# Patient Record
Sex: MALE | Race: WHITE | NOT HISPANIC OR LATINO | Employment: FULL TIME | URBAN - METROPOLITAN AREA
[De-identification: names, ages, dates, MRNs, and addresses within clinical notes are randomized per-mention and may not be internally consistent; named-entity substitution may affect disease eponyms.]

---

## 2017-05-11 ENCOUNTER — GENERIC CONVERSION - ENCOUNTER (OUTPATIENT)
Dept: OTHER | Facility: OTHER | Age: 57
End: 2017-05-11

## 2018-09-30 ENCOUNTER — OFFICE VISIT (OUTPATIENT)
Dept: FAMILY MEDICINE CLINIC | Facility: CLINIC | Age: 58
End: 2018-09-30
Payer: COMMERCIAL

## 2018-09-30 VITALS
DIASTOLIC BLOOD PRESSURE: 78 MMHG | HEIGHT: 67 IN | SYSTOLIC BLOOD PRESSURE: 120 MMHG | RESPIRATION RATE: 20 BRPM | OXYGEN SATURATION: 99 % | BODY MASS INDEX: 26.26 KG/M2 | HEART RATE: 57 BPM | TEMPERATURE: 97.8 F | WEIGHT: 167.3 LBS

## 2018-09-30 DIAGNOSIS — H60.502 ACUTE OTITIS EXTERNA OF LEFT EAR, UNSPECIFIED TYPE: Primary | ICD-10-CM

## 2018-09-30 PROCEDURE — 99202 OFFICE O/P NEW SF 15 MIN: CPT | Performed by: PHYSICIAN ASSISTANT

## 2018-09-30 RX ORDER — OFLOXACIN 3 MG/ML
5 SOLUTION AURICULAR (OTIC) 2 TIMES DAILY
Qty: 5 ML | Refills: 0 | Status: SHIPPED | OUTPATIENT
Start: 2018-09-30 | End: 2019-04-02

## 2018-09-30 RX ORDER — METHYLPREDNISOLONE 4 MG/1
TABLET ORAL
Qty: 21 TABLET | Refills: 0 | Status: SHIPPED | OUTPATIENT
Start: 2018-09-30 | End: 2019-04-02

## 2018-09-30 RX ORDER — AMOXICILLIN AND CLAVULANATE POTASSIUM 875; 125 MG/1; MG/1
1 TABLET, FILM COATED ORAL EVERY 12 HOURS SCHEDULED
Qty: 20 TABLET | Refills: 0 | Status: SHIPPED | OUTPATIENT
Start: 2018-09-30 | End: 2018-10-10

## 2018-09-30 NOTE — PROGRESS NOTES
Assessment/Plan:      Diagnoses and all orders for this visit:    Acute otitis externa of left ear, unspecified type  -     ofloxacin (FLOXIN) 0 3 % otic solution; Administer 5 drops into the left ear 2 (two) times a day  -     amoxicillin-clavulanate (AUGMENTIN) 875-125 mg per tablet; Take 1 tablet by mouth every 12 (twelve) hours for 10 days  -     Methylprednisolone 4 MG TBPK; Use as directed on package        Patient is a 58-year-old male presenting today for reestablishing and for left ear canal had external ear pain  He appears to have a severe otitis externa infection  I will place him on otic antibiotic drops, ofloxacin, as well as oral antibiotics, Augmentin b i d  Times 10 days, especially since I cannot even introduce the speculum further into the ear canal   I will also place him on a course of a Medrol Dosepak in hopes this will help with the amount of swelling and discomfort patient has with risks versus benefits and potential side effects discussed  He was advised to avoid anything else going into the ear including submersion under water for long periods  He also uses earplugs intermittently at work for which I have advised he avoid using them if he can  We will see how he does through the course of the week and he is to call or follow up with persistent or worsening symptoms  Patient declines flu vaccine today  Chief Complaint   Patient presents with    possible infection on L/ear     patient here to re-establish, patient states that it was having some discomfort on his L/ear sinces friday, patient states that he noticed some yellow discharge on his pillow on saturday  Subjective:     Patient ID: Shayna Mcgill is a 60 y o  male     56y/o male here today for left ear pain past 3 days  Reports pain when pushing on ear, discharge from ear, sometimes movement of jaw causes pain  Took ibuprofen yesterday  No fever, no sinus sxs  Review of Systems   Constitutional: Negative  HENT:        As in HPI   Skin:        As in HPI   Psychiatric/Behavioral: Negative  The following portions of the patient's history were reviewed and updated as appropriate: allergies, current medications, past family history, past medical history, past social history, past surgical history and problem list       Objective:     Physical Exam   Constitutional: He appears well-developed and well-nourished  HENT:   Head: Normocephalic  Right Ear: Hearing, tympanic membrane, external ear and ear canal normal    Nose: Nose normal    Mouth/Throat: Oropharynx is clear and moist      Left ear with significant tragal swelling and tenderness  The opening at the ear canal appears significantly swollen with some yellowish discharge visible  I am unable to introduce the speculum on the otoscope into the ear canal secondary to the amount of swelling  Neck: Neck supple  Normal movement of jaw with no tenderness at TMJ  No significant palpable lymphadenopathy  Cardiovascular: Normal rate, regular rhythm and normal heart sounds  Pulmonary/Chest: Effort normal and breath sounds normal    Psychiatric: He has a normal mood and affect         Vitals:    09/30/18 1030   BP: 120/78   BP Location: Left arm   Patient Position: Sitting   Cuff Size: Large   Pulse: 57   Resp: 20   Temp: 97 8 °F (36 6 °C)   TempSrc: Tympanic   SpO2: 99%   Weight: 75 9 kg (167 lb 4 8 oz)   Height: 5' 6 53" (1 69 m)

## 2019-04-02 ENCOUNTER — OFFICE VISIT (OUTPATIENT)
Dept: FAMILY MEDICINE CLINIC | Facility: CLINIC | Age: 59
End: 2019-04-02
Payer: COMMERCIAL

## 2019-04-02 VITALS
WEIGHT: 169 LBS | DIASTOLIC BLOOD PRESSURE: 80 MMHG | HEART RATE: 78 BPM | HEIGHT: 69 IN | TEMPERATURE: 98 F | SYSTOLIC BLOOD PRESSURE: 118 MMHG | RESPIRATION RATE: 16 BRPM | OXYGEN SATURATION: 98 % | BODY MASS INDEX: 25.03 KG/M2

## 2019-04-02 DIAGNOSIS — J22 ACUTE RESPIRATORY INFECTION: Primary | ICD-10-CM

## 2019-04-02 PROCEDURE — 99213 OFFICE O/P EST LOW 20 MIN: CPT | Performed by: PHYSICIAN ASSISTANT

## 2019-04-02 PROCEDURE — 3008F BODY MASS INDEX DOCD: CPT | Performed by: PHYSICIAN ASSISTANT

## 2019-07-31 ENCOUNTER — TRANSITIONAL CARE MANAGEMENT (OUTPATIENT)
Dept: FAMILY MEDICINE CLINIC | Facility: CLINIC | Age: 59
End: 2019-07-31

## 2019-08-02 ENCOUNTER — OFFICE VISIT (OUTPATIENT)
Dept: FAMILY MEDICINE CLINIC | Facility: CLINIC | Age: 59
End: 2019-08-02
Payer: COMMERCIAL

## 2019-08-02 VITALS
RESPIRATION RATE: 16 BRPM | HEART RATE: 57 BPM | OXYGEN SATURATION: 96 % | DIASTOLIC BLOOD PRESSURE: 70 MMHG | BODY MASS INDEX: 26.68 KG/M2 | SYSTOLIC BLOOD PRESSURE: 110 MMHG | TEMPERATURE: 97.6 F | WEIGHT: 170 LBS | HEIGHT: 67 IN

## 2019-08-02 DIAGNOSIS — R07.9 CHEST PAIN, UNSPECIFIED TYPE: Primary | ICD-10-CM

## 2019-08-02 DIAGNOSIS — R42 DIZZINESS: ICD-10-CM

## 2019-08-02 DIAGNOSIS — R53.1 GENERALIZED WEAKNESS: ICD-10-CM

## 2019-08-02 PROCEDURE — 99496 TRANSJ CARE MGMT HIGH F2F 7D: CPT | Performed by: FAMILY MEDICINE

## 2019-08-02 RX ORDER — OMEPRAZOLE 40 MG/1
CAPSULE, DELAYED RELEASE ORAL
COMMUNITY
Start: 2019-08-02 | End: 2019-12-09

## 2019-08-02 RX ORDER — OMEPRAZOLE 20 MG/1
20 CAPSULE, DELAYED RELEASE ORAL DAILY
COMMUNITY
Start: 2019-07-30 | End: 2019-08-02

## 2019-08-02 NOTE — PROGRESS NOTES
Assessment/Plan:   1  Chest pain, unspecified type/Generalized weakness/ Dizziness  Reviewed patient's symptoms today  At this time, it is still unclear as to the exact cause of his chest pain as well as his dizziness  He has been following up with Gastroenterology as well as his cardiologist   He currently is on a event monitor  He will be wearing this for an additional week  At the end of this monitor, he will be having a upper endoscopy to further evaluate the gastrointestinal/esophageal cause for her symptoms  Patient appears neurologically stable  Will consider MRI imaging of his brain over the next few weeks if his symptoms persist   Patient was advised that if any of his symptoms should worsen, he must go directly back to the ED  There are no diagnoses linked to this encounter  Subjective:    Chief Complaint   Patient presents with    Transition of Care Management     lightheaded, SOB, fatigue, numbness, chest tightness  sxs started abotu 1 year ago        Patient ID: Alvenia Lennox is a 60 y o  male  He presented to the ED on July 28th secondary to the development of chest pain  He was having dizziness as well as generalized weakness  He was admitted and did have multiple test is completed  He did have a stress test as well as blood work  All this testing appeared normal   It was questionable whether symptoms were secondary to esophagitis  He was started empirically on a PPI  He will also is also set up with a Holter monitor    He states that he will be continued follow-up with GI as well as his cardiologist     TCM Call (since 7/2/2019)     Date and time call was made  7/31/2019 10:05 AM    Hospital care reviewed  Records reviewed    Patient was hospitialized at  [facility]    Date of Admission  07/28/19    Date of discharge  07/30/19    Diagnosis  chest pain     Disposition  Home    Were the patients medications reviewed and updated  Yes    Current Symptoms  None TCM Call (since 7/2/2019)     Post hospital issues  None    Should patient be enrolled in anticoag monitoring? No    Scheduled for follow up? Yes    Did you obtain your prescribed medications  Yes    Do you need help managing your prescriptions or medications  No    Is transportation to your appointment needed  No    I have advised the patient to call PCP with any new or worsening symptoms    Marito Thapa LPN     Are you recieving any outpatient services  No    Are you recieving home care services  No    Are you using any community resources  No    Current waiver services  No    Have you fallen in the last 12 months  No    Interperter language line needed  No            Review of Systems   Constitutional: Positive for fatigue  Negative for activity change, chills and fever  HENT: Negative for congestion, ear pain, sinus pressure and sore throat  Eyes: Negative for redness, itching and visual disturbance  Respiratory: Negative for cough and shortness of breath  Cardiovascular: Positive for chest pain and palpitations  Gastrointestinal: Negative for abdominal pain, diarrhea and nausea  Endocrine: Negative for cold intolerance and heat intolerance  Genitourinary: Negative for dysuria, flank pain and frequency  Musculoskeletal: Negative for arthralgias, back pain, gait problem and myalgias  Skin: Negative for color change  Allergic/Immunologic: Negative for environmental allergies  Neurological: Negative for dizziness, numbness and headaches  Psychiatric/Behavioral: Negative for behavioral problems and sleep disturbance  The following portions of the patient's history were reviewed and updated as appropriate : past family history, past medical history, past social history and past surgical history        Current Outpatient Medications:     omeprazole (PriLOSEC) 40 MG capsule, , Disp: , Rfl:     Objective:    Vitals:    08/02/19 1446   BP: 110/70   BP Location: Right arm Patient Position: Sitting   Cuff Size: Adult   Pulse: 57   Resp: 16   Temp: 97 6 °F (36 4 °C)   TempSrc: Tympanic   SpO2: 96%   Weight: 77 1 kg (170 lb)   Height: 5' 6 5" (1 689 m)        Physical Exam   Constitutional: He is oriented to person, place, and time  He appears well-developed and well-nourished  HENT:   Head: Normocephalic and atraumatic  Nose: Nose normal    Mouth/Throat: No oropharyngeal exudate  Eyes: Pupils are equal, round, and reactive to light  Right eye exhibits no discharge  Left eye exhibits no discharge  Neck: Normal range of motion  Neck supple  No tracheal deviation present  Cardiovascular: Normal rate, regular rhythm and intact distal pulses  Exam reveals no gallop and no friction rub  No murmur heard  Pulses:       Dorsalis pedis pulses are 2+ on the right side, and 2+ on the left side  Posterior tibial pulses are 2+ on the right side, and 2+ on the left side  Pulmonary/Chest: Effort normal and breath sounds normal  No respiratory distress  He has no wheezes  He has no rales  Abdominal: Soft  Bowel sounds are normal  He exhibits no distension  There is no tenderness  There is no rebound and no guarding  Musculoskeletal: Normal range of motion  He exhibits no edema  Lymphadenopathy:        Head (right side): No submental and no submandibular adenopathy present  Head (left side): No submental and no submandibular adenopathy present  He has no cervical adenopathy  Right cervical: No superficial cervical, no deep cervical and no posterior cervical adenopathy present  Left cervical: No superficial cervical, no deep cervical and no posterior cervical adenopathy present  Neurological: He is alert and oriented to person, place, and time  No cranial nerve deficit or sensory deficit  Skin: Skin is warm, dry and intact  Psychiatric: His speech is normal and behavior is normal  Judgment normal  His mood appears not anxious   Cognition and memory are normal  He does not exhibit a depressed mood  Vitals reviewed

## 2019-08-07 ENCOUNTER — TELEPHONE (OUTPATIENT)
Dept: FAMILY MEDICINE CLINIC | Facility: CLINIC | Age: 59
End: 2019-08-07

## 2019-08-07 NOTE — TELEPHONE ENCOUNTER
Pt has heart monitor it will come off Tuesday or Wednesday  Pt is still c/o of same sxs feels better, but not 100 percent  Pt is asking what are his next steps are, and should he go back to work  Please advise  Pt is aware you are out of the office till tomorrow  Thank you!

## 2019-08-08 DIAGNOSIS — R53.1 GENERALIZED WEAKNESS: Primary | ICD-10-CM

## 2019-08-08 NOTE — TELEPHONE ENCOUNTER
Will have to wait until the results of the Holter monitor are obtained to determine what the exact cause of his symptoms are    Thank you

## 2019-08-14 ENCOUNTER — TELEPHONE (OUTPATIENT)
Dept: FAMILY MEDICINE CLINIC | Facility: CLINIC | Age: 59
End: 2019-08-14

## 2019-08-14 NOTE — TELEPHONE ENCOUNTER
Pt called to talk to Dr DAVID, he completed his heart monitor and wanted to know about his dizziness and getting an extension on his out of work  I recommended calling back on 8/19 when Dr DAVID is in office, calling his cardiologist and if his symptoms worsen he must go back to ER

## 2019-08-19 ENCOUNTER — TELEPHONE (OUTPATIENT)
Dept: FAMILY MEDICINE CLINIC | Facility: CLINIC | Age: 59
End: 2019-08-19

## 2019-08-19 NOTE — TELEPHONE ENCOUNTER
Pt is asking for an oow note till 8/26/19  He has appt with you then  He is still having dizziness  He currently has an out of work note up till today  Can we write this note

## 2019-08-19 NOTE — TELEPHONE ENCOUNTER
I will give him an out of work note until the 26th however after this time  , he must be advised by his specialist whether he should return to work    Thank you

## 2019-08-26 ENCOUNTER — OFFICE VISIT (OUTPATIENT)
Dept: FAMILY MEDICINE CLINIC | Facility: CLINIC | Age: 59
End: 2019-08-26
Payer: COMMERCIAL

## 2019-08-26 VITALS
OXYGEN SATURATION: 98 % | DIASTOLIC BLOOD PRESSURE: 76 MMHG | SYSTOLIC BLOOD PRESSURE: 138 MMHG | TEMPERATURE: 98.2 F | BODY MASS INDEX: 26.22 KG/M2 | WEIGHT: 173 LBS | HEART RATE: 54 BPM | HEIGHT: 68 IN

## 2019-08-26 DIAGNOSIS — R25.1 TREMOR: ICD-10-CM

## 2019-08-26 DIAGNOSIS — R53.1 GENERALIZED WEAKNESS: Primary | ICD-10-CM

## 2019-08-26 DIAGNOSIS — R43.0 ANOSMIA: ICD-10-CM

## 2019-08-26 DIAGNOSIS — R42 DIZZINESS: ICD-10-CM

## 2019-08-26 DIAGNOSIS — R53.82 CHRONIC FATIGUE: ICD-10-CM

## 2019-08-26 PROCEDURE — 99214 OFFICE O/P EST MOD 30 MIN: CPT | Performed by: FAMILY MEDICINE

## 2019-08-26 PROCEDURE — 3008F BODY MASS INDEX DOCD: CPT | Performed by: FAMILY MEDICINE

## 2019-08-26 NOTE — PROGRESS NOTES
Assessment/Plan:   1  Generalized weakness/Dizziness/Chronic fatigue/Anosmia/Tremor  Unclear as to the exact cause of patient's persistent symptoms  He has had an extensive evaluation while inpatient as well as with Cardiology  Given his diffuse symptoms as well as his tremor, lack of smell as well as his dizziness, will check MRI of his brain to rule out gross abnormalities will check blood work as well to rule out other problems such as EBV titers, Lyme titer, MEETA profile, ferritin, vitamin levels  Will follow up with patient once all testing is completed to review further in-depth   - EBV acute panel; Future  - Sedimentation rate, automated; Future  - C-reactive protein; Future  - Lyme Antibody Profile with reflex to WB; Future  - MEETA Screen w/ Reflex to Titer/Pattern; Future  - Ferritin; Future  - Comprehensive metabolic panel; Future  - MRI brain wo contrast; Future  - Vitamin D 25 hydroxy; Future  - Vitamin B12; Future       There are no diagnoses linked to this encounter  Subjective:    Chief Complaint   Patient presents with    Follow-up     Pt c/o dizziness, lightheaded, numbness on both hands, fatigued  Patient ID: Sherron Ochoa is a 60 y o  male  Patient is a 76-year-old male presents today for a one-month follow-up  Since last visit, he states he has been having persistent dizziness, lightheadedness, as well as significant fatigue  He states that he has been following up with Cardiology regularly  His last visit he reviewed his event monitor which was largely negative  He states that he has been having also persistent symptoms of numbness and tingling in both his hands as well as his feet  He denies any syncopal episodes  He states that walking short distances causes significant fatigue for him  He has not been taking any new medications  Review of Systems   Constitutional: Positive for fatigue  Negative for activity change, chills and fever     HENT: Negative for congestion, ear pain, sinus pressure and sore throat  Eyes: Negative for redness, itching and visual disturbance  Respiratory: Negative for cough and shortness of breath  Cardiovascular: Negative for chest pain and palpitations  Gastrointestinal: Negative for abdominal pain, diarrhea and nausea  Endocrine: Negative for cold intolerance and heat intolerance  Genitourinary: Negative for dysuria, flank pain and frequency  Musculoskeletal: Negative for arthralgias, back pain, gait problem and myalgias  Skin: Negative for color change  Allergic/Immunologic: Negative for environmental allergies  Neurological: Positive for dizziness and light-headedness  Negative for numbness and headaches  Psychiatric/Behavioral: Negative for behavioral problems and sleep disturbance  The following portions of the patient's history were reviewed and updated as appropriate : past family history, past medical history, past social history and past surgical history  Current Outpatient Medications:     omeprazole (PriLOSEC) 40 MG capsule, , Disp: , Rfl:     Objective:    Vitals:    08/26/19 1501   BP: 138/76   BP Location: Left arm   Patient Position: Sitting   Cuff Size: Adult   Pulse: (!) 54   Temp: 98 2 °F (36 8 °C)   TempSrc: Tympanic   SpO2: 98%   Weight: 78 5 kg (173 lb)   Height: 5' 7 91" (1 725 m)        Physical Exam   Constitutional: He is oriented to person, place, and time  He appears well-developed and well-nourished  HENT:   Head: Normocephalic and atraumatic  Nose: Nose normal    Mouth/Throat: No oropharyngeal exudate  Eyes: Pupils are equal, round, and reactive to light  Right eye exhibits no discharge  Left eye exhibits no discharge  Neck: Normal range of motion  Neck supple  No tracheal deviation present  Cardiovascular: Normal rate, regular rhythm and intact distal pulses  Exam reveals no gallop and no friction rub  No murmur heard    Pulses:       Dorsalis pedis pulses are 2+ on the right side, and 2+ on the left side  Posterior tibial pulses are 2+ on the right side, and 2+ on the left side  Pulmonary/Chest: Effort normal and breath sounds normal  No respiratory distress  He has no wheezes  He has no rales  Abdominal: Soft  Bowel sounds are normal  He exhibits no distension  There is no tenderness  There is no rebound and no guarding  Musculoskeletal: Normal range of motion  He exhibits no edema  Lymphadenopathy:        Head (right side): No submental and no submandibular adenopathy present  Head (left side): No submental and no submandibular adenopathy present  He has no cervical adenopathy  Right cervical: No superficial cervical, no deep cervical and no posterior cervical adenopathy present  Left cervical: No superficial cervical, no deep cervical and no posterior cervical adenopathy present  Neurological: He is alert and oriented to person, place, and time  No cranial nerve deficit or sensory deficit  Skin: Skin is warm, dry and intact  Psychiatric: His speech is normal and behavior is normal  Judgment normal  His mood appears not anxious  Cognition and memory are normal  He does not exhibit a depressed mood  Vitals reviewed  I have spent 30 minutes with Patient and family today in which greater than 50% of this time was spent in counseling/coordination of care regarding Diagnostic results, Prognosis, Risks and benefits of tx options, Intructions for management, Patient and family education and Importance of tx compliance

## 2019-08-27 ENCOUNTER — APPOINTMENT (OUTPATIENT)
Dept: LAB | Facility: CLINIC | Age: 59
End: 2019-08-27
Payer: COMMERCIAL

## 2019-08-27 ENCOUNTER — TELEPHONE (OUTPATIENT)
Dept: FAMILY MEDICINE CLINIC | Facility: CLINIC | Age: 59
End: 2019-08-27

## 2019-08-27 DIAGNOSIS — R42 DIZZINESS: ICD-10-CM

## 2019-08-27 DIAGNOSIS — R53.1 GENERALIZED WEAKNESS: ICD-10-CM

## 2019-08-27 DIAGNOSIS — R43.0 ANOSMIA: ICD-10-CM

## 2019-08-27 DIAGNOSIS — R53.82 CHRONIC FATIGUE: ICD-10-CM

## 2019-08-27 DIAGNOSIS — R25.1 TREMOR: ICD-10-CM

## 2019-08-27 LAB
25(OH)D3 SERPL-MCNC: 17.7 NG/ML (ref 30–100)
ALBUMIN SERPL BCP-MCNC: 4.4 G/DL (ref 3.5–5)
ALP SERPL-CCNC: 49 U/L (ref 46–116)
ALT SERPL W P-5'-P-CCNC: 27 U/L (ref 12–78)
ANION GAP SERPL CALCULATED.3IONS-SCNC: 7 MMOL/L (ref 4–13)
AST SERPL W P-5'-P-CCNC: 15 U/L (ref 5–45)
BILIRUB SERPL-MCNC: 0.82 MG/DL (ref 0.2–1)
BUN SERPL-MCNC: 14 MG/DL (ref 5–25)
CALCIUM SERPL-MCNC: 9.4 MG/DL (ref 8.3–10.1)
CHLORIDE SERPL-SCNC: 108 MMOL/L (ref 100–108)
CO2 SERPL-SCNC: 27 MMOL/L (ref 21–32)
CREAT SERPL-MCNC: 0.95 MG/DL (ref 0.6–1.3)
CRP SERPL QL: 4.4 MG/L
ERYTHROCYTE [SEDIMENTATION RATE] IN BLOOD: 7 MM/HOUR (ref 0–10)
FERRITIN SERPL-MCNC: 217 NG/ML (ref 8–388)
GFR SERPL CREATININE-BSD FRML MDRD: 87 ML/MIN/1.73SQ M
GLUCOSE SERPL-MCNC: 97 MG/DL (ref 65–140)
POTASSIUM SERPL-SCNC: 4.2 MMOL/L (ref 3.5–5.3)
PROT SERPL-MCNC: 7.6 G/DL (ref 6.4–8.2)
SODIUM SERPL-SCNC: 142 MMOL/L (ref 136–145)
VIT B12 SERPL-MCNC: 543 PG/ML (ref 100–900)

## 2019-08-27 PROCEDURE — 82306 VITAMIN D 25 HYDROXY: CPT

## 2019-08-27 PROCEDURE — 86140 C-REACTIVE PROTEIN: CPT

## 2019-08-27 PROCEDURE — 80053 COMPREHEN METABOLIC PANEL: CPT

## 2019-08-27 PROCEDURE — 82607 VITAMIN B-12: CPT

## 2019-08-27 PROCEDURE — 86663 EPSTEIN-BARR ANTIBODY: CPT

## 2019-08-27 PROCEDURE — 36415 COLL VENOUS BLD VENIPUNCTURE: CPT

## 2019-08-27 PROCEDURE — 85652 RBC SED RATE AUTOMATED: CPT

## 2019-08-27 PROCEDURE — 82728 ASSAY OF FERRITIN: CPT

## 2019-08-27 PROCEDURE — 86618 LYME DISEASE ANTIBODY: CPT

## 2019-08-27 PROCEDURE — 86665 EPSTEIN-BARR CAPSID VCA: CPT

## 2019-08-27 PROCEDURE — 86038 ANTINUCLEAR ANTIBODIES: CPT

## 2019-08-27 PROCEDURE — 86664 EPSTEIN-BARR NUCLEAR ANTIGEN: CPT

## 2019-08-28 LAB
B BURGDOR IGG+IGM SER-ACNC: <0.91 ISR (ref 0–0.9)
EBV EA IGG SER-ACNC: <9 U/ML (ref 0–8.9)
EBV NA IGG SER IA-ACNC: 201 U/ML (ref 0–17.9)
EBV PATRN SPEC IB-IMP: ABNORMAL
EBV VCA IGG SER IA-ACNC: >600 U/ML (ref 0–17.9)
EBV VCA IGM SER IA-ACNC: <36 U/ML (ref 0–35.9)
RYE IGE QN: NEGATIVE

## 2019-08-29 NOTE — TELEPHONE ENCOUNTER
[id number] [facility] # [id number], EXP 9/28/19  PT WIFE NOTIFIED AND WILL SCHEDULE APPT

## 2019-09-03 DIAGNOSIS — E55.9 VITAMIN D DEFICIENCY: Primary | ICD-10-CM

## 2019-09-03 RX ORDER — ERGOCALCIFEROL 1.25 MG/1
50000 CAPSULE ORAL WEEKLY
Qty: 8 CAPSULE | Refills: 0 | Status: SHIPPED | OUTPATIENT
Start: 2019-09-03 | End: 2019-12-09

## 2019-09-05 ENCOUNTER — OFFICE VISIT (OUTPATIENT)
Dept: FAMILY MEDICINE CLINIC | Facility: CLINIC | Age: 59
End: 2019-09-05
Payer: COMMERCIAL

## 2019-09-05 ENCOUNTER — TELEPHONE (OUTPATIENT)
Dept: FAMILY MEDICINE CLINIC | Facility: CLINIC | Age: 59
End: 2019-09-05

## 2019-09-05 VITALS
DIASTOLIC BLOOD PRESSURE: 60 MMHG | SYSTOLIC BLOOD PRESSURE: 128 MMHG | RESPIRATION RATE: 16 BRPM | OXYGEN SATURATION: 97 % | WEIGHT: 176 LBS | HEIGHT: 68 IN | HEART RATE: 53 BPM | BODY MASS INDEX: 26.67 KG/M2 | TEMPERATURE: 97.4 F

## 2019-09-05 DIAGNOSIS — R25.1 TREMOR: ICD-10-CM

## 2019-09-05 DIAGNOSIS — R42 DIZZINESS: Primary | ICD-10-CM

## 2019-09-05 DIAGNOSIS — F41.9 ANXIETY: ICD-10-CM

## 2019-09-05 DIAGNOSIS — R43.0 ANOSMIA: ICD-10-CM

## 2019-09-05 PROCEDURE — 99214 OFFICE O/P EST MOD 30 MIN: CPT | Performed by: FAMILY MEDICINE

## 2019-09-05 PROCEDURE — 3008F BODY MASS INDEX DOCD: CPT | Performed by: FAMILY MEDICINE

## 2019-09-05 RX ORDER — DULOXETIN HYDROCHLORIDE 30 MG/1
30 CAPSULE, DELAYED RELEASE ORAL DAILY
Qty: 30 CAPSULE | Refills: 2 | Status: SHIPPED | OUTPATIENT
Start: 2019-09-05 | End: 2019-10-14 | Stop reason: SDUPTHER

## 2019-09-05 NOTE — PROGRESS NOTES
Assessment/Plan:   1  Dizziness/Tremor/Anosmia  Unclear as to the exact cause of patient's symptoms today  Reviewed his recent blood work as well as his MRI imaging  All this testing appeared stable except for a low vitamin-D level  It is unlikely that this may cause his symptoms  Given his symptoms, he was advised that he may benefit from seeing a neurologist to further evaluate the other neurologic causes for his symptoms  Patient was advised that anxiety may be a component to the development of his symptoms  It appears that he is having panic symptoms when these episodes developed  He was advised that he may highly benefit from starting treatment with either gabapentin or Cymbalta  Will start treatment with Cymbalta 30 mg daily  Patient was given a continued out of work note  Follow up if any symptoms are worsening         There are no diagnoses linked to this encounter  Subjective:    Chief Complaint   Patient presents with    Follow-up     Dizziness, MRI, & BW Review        Patient ID: Alvenia Lennox is a 60 y o  male  Patient is a 50-year-old male presents today for a follow-up from his recent symptoms  Since his last visit, he states that his symptoms have been worsening  He has been noticing worsening dizziness, fatigue, tremors as well as new onset back pain  He would like to discuss all of his symptoms today  He has not been taking anything for symptom relief  He periodically develops symptoms however symptoms usually resolve in 10 minutes  Patient's wife states that often times patient will have a blank stare on his face  She denies any other symptoms today  Review of Systems   Constitutional: Positive for fatigue  Negative for activity change, chills and fever  HENT: Negative for congestion, ear pain, sinus pressure and sore throat  Eyes: Negative for redness, itching and visual disturbance  Respiratory: Negative for cough and shortness of breath      Cardiovascular: Negative for chest pain and palpitations  Gastrointestinal: Negative for abdominal pain, diarrhea and nausea  Endocrine: Negative for cold intolerance and heat intolerance  Genitourinary: Negative for dysuria, flank pain and frequency  Musculoskeletal: Positive for arthralgias and back pain  Negative for gait problem and myalgias  Skin: Negative for color change  Allergic/Immunologic: Negative for environmental allergies  Neurological: Positive for light-headedness  Negative for numbness and headaches  Psychiatric/Behavioral: Negative for behavioral problems and sleep disturbance  The following portions of the patient's history were reviewed and updated as appropriate : past family history, past medical history, past social history and past surgical history  Current Outpatient Medications:     ergocalciferol (VITAMIN D2) 50,000 units, Take 1 capsule (50,000 Units total) by mouth once a week for 8 doses, Disp: 8 capsule, Rfl: 0    omeprazole (PriLOSEC) 40 MG capsule, , Disp: , Rfl:     Objective:    Vitals:    09/05/19 1525   BP: 128/60   BP Location: Left arm   Patient Position: Sitting   Cuff Size: Standard   Pulse: (!) 53   Resp: 16   Temp: (!) 97 4 °F (36 3 °C)   TempSrc: Tympanic   SpO2: 97%   Weight: 79 8 kg (176 lb)   Height: 5' 7 91" (1 725 m)        Physical Exam   Constitutional: He is oriented to person, place, and time  He appears well-developed and well-nourished  HENT:   Head: Normocephalic and atraumatic  Nose: Nose normal    Mouth/Throat: No oropharyngeal exudate  Eyes: Pupils are equal, round, and reactive to light  Right eye exhibits no discharge  Left eye exhibits no discharge  Neck: Normal range of motion  Neck supple  No tracheal deviation present  Cardiovascular: Normal rate, regular rhythm and intact distal pulses  Exam reveals no gallop and no friction rub  No murmur heard    Pulses:       Dorsalis pedis pulses are 2+ on the right side, and 2+ on the left side  Posterior tibial pulses are 2+ on the right side, and 2+ on the left side  Pulmonary/Chest: Effort normal and breath sounds normal  No respiratory distress  He has no wheezes  He has no rales  Abdominal: Soft  Bowel sounds are normal  He exhibits no distension  There is no tenderness  There is no rebound and no guarding  Musculoskeletal: Normal range of motion  He exhibits no edema  Lymphadenopathy:        Head (right side): No submental and no submandibular adenopathy present  Head (left side): No submental and no submandibular adenopathy present  He has no cervical adenopathy  Right cervical: No superficial cervical, no deep cervical and no posterior cervical adenopathy present  Left cervical: No superficial cervical, no deep cervical and no posterior cervical adenopathy present  Neurological: He is alert and oriented to person, place, and time  No cranial nerve deficit or sensory deficit  Skin: Skin is warm, dry and intact  Psychiatric: His speech is normal and behavior is normal  Judgment normal  His mood appears not anxious  Cognition and memory are normal  He does not exhibit a depressed mood  Vitals reviewed

## 2019-09-05 NOTE — TELEPHONE ENCOUNTER
Sharmin Ames pts wife called pt was seen today 9/5/19 stated pt can not get in to see neurology till November  Stated you had discussed in appt putting pt on medication until he sees neurology  Asked you send rx to [facility]

## 2019-09-10 ENCOUNTER — TRANSCRIBE ORDERS (OUTPATIENT)
Dept: ADMINISTRATIVE | Facility: HOSPITAL | Age: 59
End: 2019-09-10

## 2019-09-10 ENCOUNTER — APPOINTMENT (OUTPATIENT)
Dept: LAB | Facility: HOSPITAL | Age: 59
End: 2019-09-10
Attending: INTERNAL MEDICINE
Payer: COMMERCIAL

## 2019-09-10 DIAGNOSIS — K29.70 GASTRITIS WITHOUT BLEEDING, UNSPECIFIED CHRONICITY, UNSPECIFIED GASTRITIS TYPE: Primary | ICD-10-CM

## 2019-09-10 DIAGNOSIS — K29.70 GASTRITIS WITHOUT BLEEDING, UNSPECIFIED CHRONICITY, UNSPECIFIED GASTRITIS TYPE: ICD-10-CM

## 2019-09-10 PROCEDURE — 83013 H PYLORI (C-13) BREATH: CPT

## 2019-09-10 PROCEDURE — 83014 H PYLORI DRUG ADMIN: CPT

## 2019-09-12 LAB — SCAN RESULT: NORMAL

## 2019-10-14 ENCOUNTER — APPOINTMENT (OUTPATIENT)
Dept: LAB | Facility: CLINIC | Age: 59
End: 2019-10-14
Payer: COMMERCIAL

## 2019-10-14 ENCOUNTER — CONSULT (OUTPATIENT)
Dept: NEUROLOGY | Facility: CLINIC | Age: 59
End: 2019-10-14
Payer: COMMERCIAL

## 2019-10-14 ENCOUNTER — TELEPHONE (OUTPATIENT)
Dept: NEUROLOGY | Facility: CLINIC | Age: 59
End: 2019-10-14

## 2019-10-14 VITALS
SYSTOLIC BLOOD PRESSURE: 126 MMHG | HEIGHT: 68 IN | HEART RATE: 64 BPM | DIASTOLIC BLOOD PRESSURE: 78 MMHG | BODY MASS INDEX: 27.19 KG/M2 | WEIGHT: 179.4 LBS

## 2019-10-14 DIAGNOSIS — R43.0 ANOSMIA: ICD-10-CM

## 2019-10-14 DIAGNOSIS — F41.9 ANXIETY: ICD-10-CM

## 2019-10-14 DIAGNOSIS — R68.89 SPELLS OF DECREASED ATTENTIVENESS: Primary | ICD-10-CM

## 2019-10-14 DIAGNOSIS — R25.1 TREMOR: ICD-10-CM

## 2019-10-14 DIAGNOSIS — R42 DIZZINESS: ICD-10-CM

## 2019-10-14 DIAGNOSIS — R68.89 SPELLS OF DECREASED ATTENTIVENESS: ICD-10-CM

## 2019-10-14 PROCEDURE — 99244 OFF/OP CNSLTJ NEW/EST MOD 40: CPT | Performed by: PSYCHIATRY & NEUROLOGY

## 2019-10-14 PROCEDURE — 84207 ASSAY OF VITAMIN B-6: CPT

## 2019-10-14 PROCEDURE — 84425 ASSAY OF VITAMIN B-1: CPT

## 2019-10-14 PROCEDURE — 36415 COLL VENOUS BLD VENIPUNCTURE: CPT

## 2019-10-14 RX ORDER — DULOXETIN HYDROCHLORIDE 60 MG/1
60 CAPSULE, DELAYED RELEASE ORAL DAILY
Qty: 30 CAPSULE | Refills: 2 | Status: SHIPPED | OUTPATIENT
Start: 2019-10-14 | End: 2020-01-10 | Stop reason: SDUPTHER

## 2019-10-14 NOTE — TELEPHONE ENCOUNTER
Abbi () states that dr Jhon Pompa is asking for a work note to excuse pt starting 10/15-12/9  Letter generated and printed  Abbi to give to pt

## 2019-10-14 NOTE — ASSESSMENT & PLAN NOTE
This is a 62-year-old patient with episodes of confusion sense of warmth,  staring and twitching  These have  increased in the last month  The increase did occur after he stops drinking a large amount of alcohol and stop smoking  He also describes inability to smell or taste  The MRI of the brain was performed and this was normal     Etiologies of the symptoms could include epileptogenic, metabolic, and/or psychiatric  It could be metabolic given his prior history of alcohol use and sudden  reduction          recent B12 and folate were  Normal       Plan 1  He is to have a sleep-deprived EEG 2  I have informed him that these spells may have be due to epileptiform discharges and/or due to psychiatric conditions  He does also seem to have component of anxiety and depression with a  the flattened affect  He is to have a B1 B6 level  At this point he is unable to return to work or to drive until further workups perform  He also may require a psychiatric evaluation  I have increased his Cymbalta to  60 mg a day  He is to start a multivitamin   with 100 mg of thiamine after the laboratory studies are performed  He is to return to our offices in six weeks

## 2019-10-14 NOTE — ASSESSMENT & PLAN NOTE
Today's examination failed to reveal any evidence of cogwheel rigidity resting tremor or tremor with extension  This may be due to more anxiety tremor and or a B6 of physiologic tremor

## 2019-10-14 NOTE — PATIENT INSTRUCTIONS
eeg    Increase cymbalta 60 mgg daily     Labs    Take mvi daily and thiamine 100mg daily, after labs     Avoid  etoh and smoking     Can not return to work at this time

## 2019-10-14 NOTE — PROGRESS NOTES
Patient ID: Jovany Lees is a 60 y o  male  Assessment/Plan:    Spells of decreased attentiveness  This is a 59-year-old patient with episodes of confusion sense of warmth,  staring and twitching  These have  increased in the last month  The increase did occur after he stops drinking a large amount of alcohol and stop smoking  He also describes inability to smell or taste  The MRI of the brain was performed and this was normal     Etiologies of the symptoms could include epileptogenic, metabolic, and/or psychiatric  It could be metabolic given his prior history of alcohol use and sudden  reduction          recent B12 and folate were  Normal       Plan 1  He is to have a sleep-deprived EEG 2  I have informed him that these spells may have be due to epileptiform discharges and/or due to psychiatric conditions  He does also seem to have component of anxiety and depression with a  the flattened affect  He is to have a B1 B6 level  At this point he is unable to return to work or to drive until further workups perform  He also may require a psychiatric evaluation  I have increased his Cymbalta to  60 mg a day  He is to start a multivitamin   with 100 mg of thiamine after the laboratory studies are performed  He is to return to our offices in six weeks  Tremors of nervous system  Today's examination failed to reveal any evidence of cogwheel rigidity resting tremor or tremor with extension  This may be due to more anxiety tremor and or a B6 of physiologic tremor  Diagnoses and all orders for this visit:    Spells of decreased attentiveness  -     Vitamin B1, whole blood; Future  -     Vitamin B6; Future  -     EEG Sleep deprived; Future    Dizziness  -     Ambulatory referral to Neurology  -     DULoxetine (CYMBALTA) 60 mg delayed release capsule;  Take 1 capsule (60 mg total) by mouth daily    Tremor  -     Ambulatory referral to Neurology  -     DULoxetine (CYMBALTA) 60 mg delayed release capsule; Take 1 capsule (60 mg total) by mouth daily    Anosmia  -     Ambulatory referral to Neurology  -     Vitamin B1, whole blood; Future  -     Vitamin B6; Future  -     DULoxetine (CYMBALTA) 60 mg delayed release capsule; Take 1 capsule (60 mg total) by mouth daily    Anxiety  -     DULoxetine (CYMBALTA) 60 mg delayed release capsule; Take 1 capsule (60 mg total) by mouth daily         Subjective:       this is a 59-year-old patient who presents in a new patient evaluation  He is a 59-year-old right-handed gentleman who presents for evaluation of spells  the spells began approximately one year ago  They are characterized by sense of the stomach warmth  This then goes up to his stomach  Becomes disoriented he had lost loses feeling in hands and feet  He loses  color in his face and hands This is sometimes a characterized by twitching of the mouth and staring  He will feel tired and fatigued after the episodes  These episodes can last between 1 minutes and 30 minutes  They were occurring intermittently but then increased in frequency  On July 28, 2019 due to the episodes he was seen in the emergency room at [facility]    brain ischemia and/or cardiac etiology was  ruled out  It does limit his ability to drive  He has not been driving  He was then evaluated by his family [de-identified] office in September  At that point he is placed on Cymbalta 30 mg tablets  He has notices some improvement with initiation of Cymbalta  He also describes episodes of tremors  Describes tremors in the inside of his chest as well as on the outside  This can occur with him about the other type of episodes  They do not occur rest   They can occur holding items in his hand but more predominantly they occur when driving or attempting to drive  He denies any lateralizing weakness or numbness  He denies any double vision    He did undergo a B12 level CMP which was all normal  Lyme titer was normal  CRP was 4 4 and slightly elevated      Was drinking a case of beer until July 28th  Since that time he has reduce his beer intake to two cans of beer on the weekends he also was smoking 1 to 2 packs a day years since age of 12  He stopped in on July 28th after his ER evaluation  He was working at the [employer]  Has not worked since July 20 he is currently not driving    Today his wife Jack Cade also accompanies him to the visit                                           The following portions of the patient's history were reviewed and updated as appropriate: He  has no past medical history on file  He  has a past surgical history that includes Cataract extraction (Bilateral); Foot surgery (Right); and Wrist surgery (Left)  His family history includes Diabetes in his maternal aunt; No Known Problems in his father and mother  He  reports that he quit smoking about 2 months ago  His smoking use included cigarettes  He has never used smokeless tobacco  He reports that he drank alcohol  He reports that he does not use drugs  Current Outpatient Medications   Medication Sig Dispense Refill    DULoxetine (CYMBALTA) 60 mg delayed release capsule Take 1 capsule (60 mg total) by mouth daily 30 capsule 2    ergocalciferol (VITAMIN D2) 50,000 units Take 1 capsule (50,000 Units total) by mouth once a week for 8 doses (Patient not taking: Reported on 10/14/2019) 8 capsule 0    omeprazole (PriLOSEC) 40 MG capsule        No current facility-administered medications for this visit  He has No Known Allergies            Objective:    Blood pressure 126/78, pulse 64, height 5' 7 91" (1 725 m), weight 81 4 kg (179 lb 6 4 oz)  Physical Exam   Eyes: Pupils are equal, round, and reactive to light  Lids are normal    Neurological: He has normal strength  Reflex Scores:       Tricep reflexes are 2+ on the right side and 2+ on the left side         Bicep reflexes are 2+ on the right side and 2+ on the left side  Patellar reflexes are 2+ on the right side and 2+ on the left side  Achilles reflexes are 2+ on the right side and 2+ on the left side  Psychiatric: His speech is normal        Neurological Exam  Mental Status   Oriented to person, place, time and situation  Speech is normal  Language is fluent with no aphasia  He has a flattened affect  Cranial Nerves  CN II: Visual acuity is normal  Visual fields full to confrontation  CN III, IV, VI: Extraocular movements intact bilaterally  Normal lids and orbits bilaterally  Pupils equal round and reactive to light bilaterally  CN V: Facial sensation is normal   CN VII: Full and symmetric facial movement  CN VIII: Hearing is normal   CN IX, X: Palate elevates symmetrically  Normal gag reflex  CN XI: Shoulder shrug strength is normal   CN XII: Tongue midline without atrophy or fasciculations  Motor  Normal muscle bulk throughout  Strength is 5/5 throughout all four extremities  Sensory  Pp, jps  And temp and intact normal     Reflexes                                           Right                      Left  Biceps                                 2+                         2+  Triceps                                2+                         2+  Patellar                                2+                         2+  Achilles                                2+                         2+  Plantar                           Downgoing                Downgoing    Right pathological reflexes: Di's absent  Left pathological reflexes: Di's absent  Coordination  Right: Finger-to-nose normal  Heel-to-shin normal   Left: Finger-to-nose normal  Heel-to-shin normal   There is no evidence of cogwheel rigidity resting tremor or tremor with extension  Gait  Casual gait is normal including stance, stride, and arm swing  Able to rise from chair without using arms      Review of systems obtained by the medical assistant as below was reviewed with the patient at today's visit    ROS:    Review of Systems   Constitutional: Positive for appetite change and fatigue  Negative for fever  HENT: Positive for tinnitus and trouble swallowing  Negative for hearing loss and voice change  Snoring  Taste or smell changes     Eyes: Negative  Negative for photophobia and pain  Respiratory: Positive for shortness of breath  Cardiovascular: Positive for palpitations  Chest pressure     Gastrointestinal: Positive for nausea  Negative for vomiting  Endocrine: Negative for cold intolerance and heat intolerance  Loss of Sexual Drive  Erection Difficulties   Genitourinary: Negative  Negative for dysuria, frequency and urgency  Musculoskeletal: Positive for back pain, joint swelling and myalgias  Negative for neck pain  Immobility or loss of function  Balance issues     Skin: Negative  Negative for rash  Allergic/Immunologic: Negative  Neurological: Positive for dizziness, light-headedness, numbness (Facial Numbness, numbness) and headaches  Negative for tremors, seizures, syncope, facial asymmetry, speech difficulty and weakness  Twitching  Tingling   Hematological: Negative  Does not bruise/bleed easily  Psychiatric/Behavioral: Negative for confusion, hallucinations and sleep disturbance  Anxiety     All other systems reviewed and are negative

## 2019-10-17 ENCOUNTER — TELEPHONE (OUTPATIENT)
Dept: OTHER | Facility: HOSPITAL | Age: 59
End: 2019-10-17

## 2019-10-17 DIAGNOSIS — R68.89 SPELLS OF DECREASED ATTENTIVENESS: Primary | ICD-10-CM

## 2019-10-17 LAB
VIT B1 BLD-SCNC: 156.2 NMOL/L (ref 66.5–200)
VIT B6 SERPL-MCNC: NORMAL UG/L

## 2019-10-18 NOTE — TELEPHONE ENCOUNTER
[facility] couldn't be completed because of the way it was packaged  It was in the incorrect tube and it had to be frozen

## 2019-10-18 NOTE — TELEPHONE ENCOUNTER
Contacted pt to inform him b6 was unable to be completed  He will have to get labs done again   lmom

## 2019-10-22 ENCOUNTER — TRANSCRIBE ORDERS (OUTPATIENT)
Dept: ADMINISTRATIVE | Facility: HOSPITAL | Age: 59
End: 2019-10-22

## 2019-10-22 ENCOUNTER — APPOINTMENT (OUTPATIENT)
Dept: LAB | Facility: HOSPITAL | Age: 59
End: 2019-10-22
Attending: PSYCHIATRY & NEUROLOGY
Payer: COMMERCIAL

## 2019-10-22 DIAGNOSIS — K29.70 GASTRITIS WITHOUT BLEEDING, UNSPECIFIED CHRONICITY, UNSPECIFIED GASTRITIS TYPE: ICD-10-CM

## 2019-10-22 DIAGNOSIS — K29.70 GASTRITIS WITHOUT BLEEDING, UNSPECIFIED CHRONICITY, UNSPECIFIED GASTRITIS TYPE: Primary | ICD-10-CM

## 2019-10-22 DIAGNOSIS — R68.89 SPELLS OF DECREASED ATTENTIVENESS: ICD-10-CM

## 2019-10-22 PROCEDURE — 36415 COLL VENOUS BLD VENIPUNCTURE: CPT

## 2019-10-22 PROCEDURE — 83013 H PYLORI (C-13) BREATH: CPT

## 2019-10-22 PROCEDURE — 83014 H PYLORI DRUG ADMIN: CPT

## 2019-10-22 PROCEDURE — 84207 ASSAY OF VITAMIN B-6: CPT

## 2019-10-26 LAB — VIT B6 SERPL-MCNC: 9.1 UG/L (ref 5.3–46.7)

## 2019-10-28 ENCOUNTER — HOSPITAL ENCOUNTER (OUTPATIENT)
Dept: NEUROLOGY | Facility: CLINIC | Age: 59
Discharge: HOME | End: 2019-10-28
Payer: COMMERCIAL

## 2019-10-28 DIAGNOSIS — R68.89 SPELLS OF DECREASED ATTENTIVENESS: ICD-10-CM

## 2019-10-28 DIAGNOSIS — R68.89 SPELLS OF DECREASED ATTENTIVENESS: Primary | ICD-10-CM

## 2019-10-28 DIAGNOSIS — G40.209 EPILEPSY WITH PARTIAL COMPLEX SEIZURES, WITHOUT STATUS EPILEPTICUS (HCC): ICD-10-CM

## 2019-10-28 PROCEDURE — 95813 EEG EXTND MNTR 61-119 MIN: CPT | Performed by: PSYCHIATRY & NEUROLOGY

## 2019-10-28 PROCEDURE — 95819 EEG AWAKE AND ASLEEP: CPT

## 2019-10-28 RX ORDER — OXCARBAZEPINE 150 MG/1
TABLET, FILM COATED ORAL
Qty: 60 TABLET | Refills: 2 | Status: SHIPPED | OUTPATIENT
Start: 2019-10-28 | End: 2019-12-09 | Stop reason: SDUPTHER

## 2019-10-28 NOTE — PROGRESS NOTES
EEG was read earlier today    Today I spoke to Alfonso Sorensen  He informs me that his spells and feeling are better  He does have a but they are not as intense  He does have feeling of being disjointed nausea with tingling  He also describes warmth sensation in the stomach  I informed him that the EEG did show hyperactivity in the left temporal region and hopefully the addition of this medication will help his symptoms      EEG does show 1  Left temporal slowing 2  Sharply contoured activity bilateral temporal region 3  Sharp activity emanating from the left temporal region with phase reversal at T3    Plan 1  He is to continue on Cymbalta 60 mg a day 2  I will start him on Trileptal 150 mg at bedtime for three days and then 150 twice a day   I sent it electronically to his pharmacy  In two weeks  he is to obtain a cmp  Please send a  of the labs to the pt in the mail  We discussed the common side effects of diplopia ataxia and severe nausea from Trileptal     He has an appointment with me on December the ninth     Please  cancel the appointment with Dr Irina Hughes on  December the 16

## 2019-10-29 ENCOUNTER — TELEPHONE (OUTPATIENT)
Dept: NEUROLOGY | Facility: CLINIC | Age: 59
End: 2019-10-29

## 2019-10-29 NOTE — TELEPHONE ENCOUNTER
I spoke to Izola Crigler ( his wife)     I explained the need for the meds, the eeg result and potential future workup     He may need a mri of the brain with contrast with temporal cuts    May need a LP as well     We discussed the side effects     We can discuss more at the next visit     No need to call her

## 2019-10-29 NOTE — TELEPHONE ENCOUNTER
Cancelled appt with Dr Kenneth Miller on 12/16  And cmp lab order mailed to patient  Poppy Craig DO at 10/28/2019  5:01 PM     EEG was read earlier today     Today I spoke to Kandice Patel  He informs me that his spells and feeling are better  He does have a but they are not as intense  He does have feeling of being disjointed nausea with tingling  He also describes warmth sensation in the stomach  I informed him that the EEG did show hyperactivity in the left temporal region and hopefully the addition of this medication will help his symptoms        EEG does show 1  Left temporal slowing 2  Sharply contoured activity bilateral temporal region 3  Sharp activity emanating from the left temporal region with phase reversal at T3     Plan 1  He is to continue on Cymbalta 60 mg a day 2  I will start him on Trileptal 150 mg at bedtime for three days and then 150 twice a day   I sent it electronically to his pharmacy  In two weeks  he is to obtain a cmp  Please send a  of the labs to the pt in the mail       We discussed the common side effects of diplopia ataxia and severe nausea from Trileptal      He has an appointment with me on December the ninth     Please  cancel the appointment with Dr Kenneth Miller on  December the 16

## 2019-10-29 NOTE — TELEPHONE ENCOUNTER
Pt's wife calls in to state that she spoke with pt yesterday and she has some additional questions  I did made her aware of your progress note message and plan of care  She is kindly asking if you could give her a call to further discuss      [phone]

## 2019-10-30 LAB — SCAN RESULT: NORMAL

## 2019-11-05 NOTE — TELEPHONE ENCOUNTER
Trileptal does  not usually cause bloated felling and gas symptoms    Would change it to bid  And check with the pcp if any other etiology     Glad the episodes are better but would address these above issues prior to changing or increasing the med     Please call back in 1 week

## 2019-11-05 NOTE — TELEPHONE ENCOUNTER
Pt's wife calls in to give update  She states that pt is still having "episodes" since starting trileptal but they are not as severe  Pt having episodes several times per day lasting about 4 mins  During an episode pt has tremors in the right arms and what pt's wife described as "tremors on the inside", but also experiences some chest tightness during this time however this is not a new symptom  She states that the episodes have been less severe than in the past  Pt no longer has the tingling in his arms and hands  Since starting trileptal, pt's stomach has become very distended and he complains of gas and a bloated feeling  Pt has been taking 2 tabs in am  Made wife aware pt to take 1 tab twice daily  She will have him start taking correctly  Pt's wife mostly concerned about GI side effects  Bowel habits have been the same but she is concerned with the distension in abdomen

## 2019-11-25 ENCOUNTER — TRANSCRIBE ORDERS (OUTPATIENT)
Dept: ADMINISTRATIVE | Facility: HOSPITAL | Age: 59
End: 2019-11-25

## 2019-11-25 ENCOUNTER — APPOINTMENT (OUTPATIENT)
Dept: LAB | Facility: HOSPITAL | Age: 59
End: 2019-11-25
Attending: INTERNAL MEDICINE
Payer: COMMERCIAL

## 2019-11-25 DIAGNOSIS — K29.70 GASTROESOPHAGITIS: ICD-10-CM

## 2019-11-25 DIAGNOSIS — B96.81 GASTRIC ULCER DUE TO HELICOBACTER PYLORI, UNSPECIFIED CHRONICITY: ICD-10-CM

## 2019-11-25 DIAGNOSIS — K25.9 GASTRIC ULCER DUE TO HELICOBACTER PYLORI, UNSPECIFIED CHRONICITY: ICD-10-CM

## 2019-11-25 DIAGNOSIS — K20.90 GASTROESOPHAGITIS: Primary | ICD-10-CM

## 2019-11-25 DIAGNOSIS — K20.90 GASTROESOPHAGITIS: ICD-10-CM

## 2019-11-25 DIAGNOSIS — K29.70 GASTROESOPHAGITIS: Primary | ICD-10-CM

## 2019-11-25 PROCEDURE — 83014 H PYLORI DRUG ADMIN: CPT

## 2019-11-25 PROCEDURE — 83013 H PYLORI (C-13) BREATH: CPT

## 2019-12-03 LAB — SCAN RESULT: NORMAL

## 2019-12-05 ENCOUNTER — TELEPHONE (OUTPATIENT)
Dept: NEUROLOGY | Facility: CLINIC | Age: 59
End: 2019-12-05

## 2019-12-09 ENCOUNTER — OFFICE VISIT (OUTPATIENT)
Dept: NEUROLOGY | Facility: CLINIC | Age: 59
End: 2019-12-09
Payer: COMMERCIAL

## 2019-12-09 VITALS
WEIGHT: 188.6 LBS | HEIGHT: 68 IN | HEART RATE: 52 BPM | DIASTOLIC BLOOD PRESSURE: 80 MMHG | BODY MASS INDEX: 28.58 KG/M2 | SYSTOLIC BLOOD PRESSURE: 140 MMHG | OXYGEN SATURATION: 95 % | RESPIRATION RATE: 14 BRPM

## 2019-12-09 DIAGNOSIS — G40.209 PARTIAL SYMPTOMATIC EPILEPSY WITH COMPLEX PARTIAL SEIZURES, NOT INTRACTABLE, WITHOUT STATUS EPILEPTICUS (HCC): Primary | ICD-10-CM

## 2019-12-09 DIAGNOSIS — G40.209 EPILEPSY WITH PARTIAL COMPLEX SEIZURES, WITHOUT STATUS EPILEPTICUS (HCC): ICD-10-CM

## 2019-12-09 DIAGNOSIS — R43.0 ANOSMIA: ICD-10-CM

## 2019-12-09 DIAGNOSIS — R42 DIZZINESS: ICD-10-CM

## 2019-12-09 DIAGNOSIS — F41.9 ANXIETY: ICD-10-CM

## 2019-12-09 DIAGNOSIS — R25.1 TREMOR: ICD-10-CM

## 2019-12-09 DIAGNOSIS — R68.89 SPELLS OF DECREASED ATTENTIVENESS: ICD-10-CM

## 2019-12-09 PROCEDURE — 99214 OFFICE O/P EST MOD 30 MIN: CPT | Performed by: PSYCHIATRY & NEUROLOGY

## 2019-12-09 RX ORDER — OXCARBAZEPINE 150 MG/1
TABLET, FILM COATED ORAL
Qty: 120 TABLET | Refills: 1 | Status: SHIPPED | OUTPATIENT
Start: 2019-12-09 | End: 2020-01-20 | Stop reason: SDUPTHER

## 2019-12-09 NOTE — ASSESSMENT & PLAN NOTE
This is a 56-year-old patient who initially presented to our offices for spells  He had undergone a MRI of the brain which was normal  He also underwent an EEG which showed left temporal sharp waves  He was started on Trileptal and is currently on Trileptal 150 mg twice a day and Cymbalta 60 mg day  He has brief auras  He is no longer having staring spells       I have asked him to increase the Trileptal to 300 mg twice a day  I have also asked him to reduce the Cymbalta to 60 mg every other day  He does complained of diarrhea  If the diarrhea persists we may need to think of alternative medications for treatment of his epilepsy  I have asked him to refrain from driving from longer distances  I have ordered a MRI of the brain with temporal cuts with three T MRI to look clearly evaluate temporal region due to his recent diagnosis     he is concerned about the return to work  I have asked his wife to look into if he had short-term disability or  long-term disability  she will call in approximately 3 to 4 weeks  He can then subsequently decide upon his return to work

## 2019-12-09 NOTE — PROGRESS NOTES
Patient ID: Akash Fine is a 60 y o  male  Assessment/Plan:    [facility]int[facility]  This is a 62-year-old patient who initially presented to our offices for spells  He had undergone a MRI of the brain which was normal  He also underwent an EEG which showed left temporal sharp waves  He was started on Trileptal and is currently on Trileptal 150 mg twice a day and Cymbalta 60 mg day  He has brief auras  He is no longer having staring spells       I have asked him to increase the Trileptal to 300 mg twice a day  I have also asked him to reduce the Cymbalta to 60 mg every other day  He does complained of diarrhea  If the diarrhea persists we may need to think of alternative medications for treatment of his epilepsy  I have asked him to refrain from driving from longer distances  I have ordered a MRI of the brain with temporal cuts with three T MRI to look clearly evaluate temporal region due to his recent diagnosis     he is concerned about the return to work  I have asked his wife to look into if he had short-term disability or  long-term disability  she will call in approximately 3 to 4 weeks  He can then subsequently decide upon his return to work  Diagnoses and all orders for this visit:    Partial symptomatic epilepsy with complex partial seizures, not intractable, without status epilepticus (HCC)    Dizziness    Tremor    Anosmia    Anxiety    Spells of decreased attentiveness  -     OXcarbazepine (TRILEPTAL) 150 mg tablet; 2 po bid    Epilepsy with partial complex seizures, without status epilepticus ([facility] )  -     MRI brain with and without contrast; Future  -     OXcarbazepine (TRILEPTAL) 150 mg tablet; 2 po bid  -     Basic metabolic panel; Future       He is to return to our offices in January    Subjective:    this is a 62-year-old patient who presents in a follow up visit   n    He is a 62-year-old right-handed gentleman who presented  for evaluation of spells  the spells began approximately 18 months ago   They are characterized by sense of the stomach warmth  This then goes up to his stomach  Becomes disoriented he had lost loses feeling in hands and feet  He loses  color in his face and hands This is sometimes a characterized by twitching of the mouth and staring  He will feel tired and fatigued after the episodes  These episodes can last between 1 minutes and 30 minutes  They were occurring intermittently but then increased in frequency  On July 28, 2019 due to the episodes he was seen in the emergency room at [facility]    brain ischemia and/or cardiac etiology was  ruled out  It does limit his ability to drive  He has not been driving  He was then evaluated by his family [de-identified] office in September  At that point he is placed on Cymbalta 30 mg tablets  it was also determined that he was drinking  alcohol  He had stopped as of the ER visit on July the 28 th     He did undergo an MRI of the brain in September of 2019 that was normal    B1 was 156 and B6 level was 9 1  After the initial visit his dose of duloxetine was increased to 60 milligrams a day and he was asked to obtain an EEG  The EEG was abnormal and it did demonstrate   Bilateral temporal slowing, left focal temporal slowing and sharp activity emanating from the left temporal region  Lucila Mortimer was started on Trileptal 150 milligrams twice a day  Since being on this medication he reports improvement of his symptomatology  He is no longer as staring spells  He does have intermittent auras but that occur  Once or twice a day but do not progress to staring spells  He denies any loss of consciousness      He does report some diarrhea which has occurred since initiating both Cymbalta and Trileptal       He also had a repeat bmp  which was normal                                     The following portions of the patient's history were reviewed and updated as appropriate:   He  has no past medical history on file  He  has a past surgical history that includes Cataract extraction (Bilateral); Foot surgery (Right); and Wrist surgery (Left)  His family history includes Diabetes in his maternal aunt; No Known Problems in his father and mother  He  reports that he quit smoking about 4 months ago  His smoking use included cigarettes  He has never used smokeless tobacco  He reports that he drank alcohol  He reports that he does not use drugs  Current Outpatient Medications   Medication Sig Dispense Refill    DULoxetine (CYMBALTA) 60 mg delayed release capsule Take 1 capsule (60 mg total) by mouth daily 30 capsule 2    OXcarbazepine (TRILEPTAL) 150 mg tablet 2 po bid 120 tablet 1     No current facility-administered medications for this visit  He has No Known Allergies            Objective:    Blood pressure 140/80, pulse (!) 52, resp  rate 14, height 5' 8" (1 727 m), weight 85 5 kg (188 lb 9 6 oz), SpO2 95 %  Physical Exam   Constitutional: He appears well-developed  HENT:   Head: Normocephalic  Eyes: Pupils are equal, round, and reactive to light  Lids are normal    Neurological: He has normal strength  Reflex Scores:       Tricep reflexes are Tr on the right side and Tr on the left side  Bicep reflexes are 1+ on the right side and 1+ on the left side  Patellar reflexes are 1+ on the right side and 1+ on the left side  Achilles reflexes are Tr on the right side and Tr on the left side  Psychiatric: His speech is normal    Vitals reviewed  Neurological Exam  Mental Status   Oriented to person, place, time and situation  Speech is normal  Language is fluent with no aphasia  Flattened affect  Cranial Nerves  CN II: Visual acuity is normal  Visual fields full to confrontation  CN III, IV, VI: Extraocular movements intact bilaterally  Normal lids and orbits bilaterally   Pupils equal round and reactive to light bilaterally  CN V: Facial sensation is normal   CN VII: Full and symmetric facial movement  CN VIII: Hearing is normal   CN IX, X: Palate elevates symmetrically  Normal gag reflex  CN XI: Shoulder shrug strength is normal   CN XII: Tongue midline without atrophy or fasciculations  Motor  Normal muscle bulk throughout  No fasciculations present  Strength is 5/5 throughout all four extremities  Sensory  Light touch is normal in upper and lower extremities  Temperature is normal in upper and lower extremities  Reflexes                                           Right                      Left  Biceps                                 1+                         1+  Triceps                                Tr                         Tr  Patellar                                1+                         1+  Achilles                                Tr                         Tr    Right pathological reflexes: Di's absent  Left pathological reflexes: Di's absent  Coordination  Right: Finger-to-nose normal   Left: Finger-to-nose normal     Gait  Normal casual, toe, heel and tandem gait  Able to rise from chair without using arms  review of systems obtained by the medical assistant as below was reviewed with the patient at today's appointment  ROS:    Review of Systems   Constitutional: Positive for activity change and unexpected weight change  Negative for appetite change and fever  HENT: Negative  Negative for hearing loss, tinnitus, trouble swallowing and voice change  Eyes: Negative  Negative for photophobia and pain  Respiratory: Negative  Negative for shortness of breath  Cardiovascular: Negative  Negative for palpitations  Gastrointestinal: Positive for diarrhea  Negative for nausea and vomiting  Endocrine: Negative  Negative for cold intolerance and heat intolerance  Genitourinary: Negative  Negative for dysuria, frequency and urgency  Musculoskeletal: Negative  Negative for myalgias and neck pain  Skin: Negative  Negative for rash  Allergic/Immunologic: Negative  Neurological: Negative  Negative for dizziness, tremors, seizures, syncope, facial asymmetry, speech difficulty, weakness, light-headedness, numbness and headaches  Hematological: Negative  Does not bruise/bleed easily  Psychiatric/Behavioral: Negative  Negative for confusion, hallucinations and sleep disturbance  All other systems reviewed and are negative

## 2019-12-23 ENCOUNTER — HOSPITAL ENCOUNTER (OUTPATIENT)
Dept: RADIOLOGY | Age: 59
Discharge: HOME | End: 2019-12-23
Payer: COMMERCIAL

## 2019-12-23 DIAGNOSIS — G40.209 EPILEPSY WITH PARTIAL COMPLEX SEIZURES, WITHOUT STATUS EPILEPTICUS (HCC): ICD-10-CM

## 2019-12-23 PROCEDURE — A9585 GADOBUTROL INJECTION: HCPCS | Performed by: PSYCHIATRY & NEUROLOGY

## 2019-12-23 PROCEDURE — 70553 MRI BRAIN STEM W/O & W/DYE: CPT

## 2019-12-23 RX ADMIN — GADOBUTROL 8 ML: 604.72 INJECTION INTRAVENOUS at 14:42

## 2019-12-26 ENCOUNTER — TELEPHONE (OUTPATIENT)
Dept: NEUROLOGY | Facility: CLINIC | Age: 59
End: 2019-12-26

## 2019-12-26 NOTE — TELEPHONE ENCOUNTER
----- Message from Ventura Noonan RN sent at 12/26/2019  8:08 AM EST -----      ----- Message -----  From: Sharona Gibbs DO  Sent: 12/26/2019   8:01 AM EST  To: [facility] [place] [facility]    Please let Alfonso Sorensen and Michael Melgar know that the mri was normal   They did   A more detailed exam and there was no change

## 2019-12-26 NOTE — TELEPHONE ENCOUNTER
Called patient regarding his MRI results  [facility] for a phone call back

## 2019-12-30 ENCOUNTER — TELEPHONE (OUTPATIENT)
Dept: NEUROLOGY | Facility: CLINIC | Age: 59
End: 2019-12-30

## 2019-12-30 NOTE — TELEPHONE ENCOUNTER
Patient called to report new onset of symptoms  Patient reports feeling anxious, nauseated, and jittery  He feels symptoms worsen around the time frame of of when he is taking duloxetine and then resolve by the next day  Currently taking duloxetine every other day  Patient also asking for a letter for employer, he doesn't feel well enough to go back to work and employer is okay with him being out of work until he has follow up with you 1/20/20  Are you agreeable to letter excusing him from work until re-evaluated in office?

## 2019-12-30 NOTE — LETTER
December 30, 2019       Patient: Marleni Vega   YOB: 1960     To Whom It May Concern:    Desiree Tristan is under my professional care  Due to his current neurological condition and the symptoms he is experiencing, please excuse him from work until he is re-evaluated in office on January 20th, 2020  His return to work status will be addressed at this time  If you have any further questions or concerns, please feel free to contact our office at [phone]       Sincerely,     Carson Herrera, DO

## 2019-12-30 NOTE — TELEPHONE ENCOUNTER
Left message on home number for a return call to office  Attempted mobile number, spoke to Dayami  Verbalized understanding with increase in frequency of cymbalta  Will attempt  Requested work note be faxed to her at [phone]  Printed and faxed  Copy also placed in mail for patient

## 2020-01-10 DIAGNOSIS — R43.0 ANOSMIA: ICD-10-CM

## 2020-01-10 DIAGNOSIS — F41.9 ANXIETY: ICD-10-CM

## 2020-01-10 DIAGNOSIS — R42 DIZZINESS: ICD-10-CM

## 2020-01-10 DIAGNOSIS — R25.1 TREMOR: ICD-10-CM

## 2020-01-10 RX ORDER — DULOXETIN HYDROCHLORIDE 60 MG/1
60 CAPSULE, DELAYED RELEASE ORAL DAILY
Qty: 30 CAPSULE | Refills: 2 | Status: SHIPPED | OUTPATIENT
Start: 2020-01-10 | End: 2020-02-24 | Stop reason: SDUPTHER

## 2020-01-10 NOTE — TELEPHONE ENCOUNTER
pt called to give an update on how he is doing  he is doing a lot better  auras are better, come and go but not as frequent or intense  still coming several times a day, last 5-10 min, before they could last all day  also states that his stomach is feeling better  no seizures  cymbalta 60mg daily  trileptal 150mg 2 tabs bid  feels better taking cymbalta every day vs every other day   He states that he needs a refill on cymbalta    Script entered for Radha Hemphill  [phone] to leave a detailed message

## 2020-01-17 ENCOUNTER — TELEPHONE (OUTPATIENT)
Dept: NEUROLOGY | Facility: CLINIC | Age: 60
End: 2020-01-17

## 2020-01-20 ENCOUNTER — OFFICE VISIT (OUTPATIENT)
Dept: NEUROLOGY | Facility: CLINIC | Age: 60
End: 2020-01-20
Payer: COMMERCIAL

## 2020-01-20 VITALS
WEIGHT: 195 LBS | HEART RATE: 57 BPM | BODY MASS INDEX: 29.55 KG/M2 | SYSTOLIC BLOOD PRESSURE: 142 MMHG | HEIGHT: 68 IN | DIASTOLIC BLOOD PRESSURE: 88 MMHG

## 2020-01-20 DIAGNOSIS — G40.209 PARTIAL SYMPTOMATIC EPILEPSY WITH COMPLEX PARTIAL SEIZURES, NOT INTRACTABLE, WITHOUT STATUS EPILEPTICUS (HCC): Primary | ICD-10-CM

## 2020-01-20 DIAGNOSIS — G40.209 EPILEPSY WITH PARTIAL COMPLEX SEIZURES, WITHOUT STATUS EPILEPTICUS (HCC): ICD-10-CM

## 2020-01-20 DIAGNOSIS — R68.89 SPELLS OF DECREASED ATTENTIVENESS: ICD-10-CM

## 2020-01-20 PROBLEM — F41.8 OTHER SPECIFIED ANXIETY DISORDERS: Status: ACTIVE | Noted: 2020-01-20

## 2020-01-20 PROCEDURE — 99214 OFFICE O/P EST MOD 30 MIN: CPT | Performed by: PSYCHIATRY & NEUROLOGY

## 2020-01-20 RX ORDER — OXCARBAZEPINE 300 MG/1
TABLET, FILM COATED ORAL
Qty: 60 TABLET | Refills: 3 | Status: SHIPPED | OUTPATIENT
Start: 2020-01-20 | End: 2020-01-31 | Stop reason: SDUPTHER

## 2020-01-20 RX ORDER — LORAZEPAM 0.5 MG/1
0.5 TABLET ORAL EVERY 8 HOURS PRN
Qty: 10 TABLET | Refills: 0 | Status: SHIPPED | OUTPATIENT
Start: 2020-01-20

## 2020-01-20 NOTE — ASSESSMENT & PLAN NOTE
He does have a component of anxiety and depression and this is somewhat improved  He is to continue on Cymbalta 60 mg a day

## 2020-01-20 NOTE — PROGRESS NOTES
Patient ID: Vicente Polo is a 60 y o  male  Assessment/Plan:    Nonintractable epilepsy with complex partial seizures ([facility] [address] )  Katelin Santillan is a 69-year-old patient with partial complex seizures  He is currently on Trileptal 300 mg twice a day  He continues to have auras however they have increased they have decreased in frequency and intensity  Today he does report of both good and bad days  Since he has now is back to 70% of his normal   I will increase his Trileptal to 450 mg twice a day  He should have a BMP prior to the next visit  Ativan was provided to the patient for severe or assist     If this proves to be ineffective and he continues to have symptoms or developed severe side effects that we have discussed we could add lamotrigine    Keppra is relatively contraindicated due to his underlying anxiety      He does complain of some intermittent blurred vision  He did have a prior history of cataract removal   Past him to seek an appointment with an ophthalmologist     I have asked him to completely refrain from alcohol and not  Drive  He was agreeable  Other specified anxiety disorders  He does have a component of anxiety and depression and this is somewhat improved  He is to continue on Cymbalta 60 mg a day  He is to contact our offices in approximately one week for progress report otherwise he is to return to our offices in one month   Diagnoses and all orders for this visit:    Partial symptomatic epilepsy with complex partial seizures, not intractable, without status epilepticus ([facility] [address] )    Spells of decreased attentiveness  -     OXcarbazepine (TRILEPTAL) 300 mg tablet; 2 po bid    Epilepsy with partial complex seizures, without status epilepticus (HCC)  -     OXcarbazepine (TRILEPTAL) 300 mg tablet; 2 po bid  -     LORazepam (ATIVAN) 0 5 mg tablet; Take 1 tablet (0 5 mg total) by mouth every 8 (eight) hours as needed for seizures  -     Basic metabolic panel;  Future     We also discussed on his nightmares which seem to be occurring during REM sleep  They occur intermittently  I have informed that this could be related to a lack of sleep the prior day  He was previously tested for sleep apnea that was negative  He had  a sleep study performed  in the past   He does snore  This could be something that we need to consider for sleep evaluation in the future  Subjective:    this is a 79-year-old patient who presents in a follow up visit   the spells began approximately 24 months ago   They are characterized by sense of the stomach warmth  This then goes up to his stomach  Becomes disoriented he had lost loses feeling in hands and feet  He loses  color in his face and hands This is sometimes a characterized by twitching of the mouth and staring  He will feel tired and fatigued after the episodes  These episodes can last between 1 minutes and 30 minutes  They were occurring intermittently but then increased in frequency  On July 28, 2019 due to the episodes he was seen in the emergency room at [facility]    brain ischemia and/or cardiac etiology was  ruled out  It does limit his ability to drive  He has not been driving  He was then evaluated by his family [de-identified] office in September  At that point he is placed on Cymbalta 30 mg tablets  The dose of Cymbalta was increased to 60 mg a day  It was also felt that he could have a component of partial complex seizures and he did undergo an EEG which was abnormal   It demonstrated bilateral temporal slowing left focal temporal slowing and sharp waves emanating from the left temporal region  Subsequently started on Trileptal 150 month mg twice a day and increase to 300 twice a day  Approximately one week ago his dose of Trileptal was increased to 450 mg twice a day  Since his last visit he is currently doing well  He does report good and bad days    On good days he will have minimal auras but on bad days have will have auras may be lasting few minutes  He did have an aura yesterday that lasted approximately several minutes  This is characterized by a sense of nausea on the stomach radiating up to his neck with of alteration of consciousness  Complaints include some blurred vision but no double vision no ataxia  A overall his spirits have improved  He is concerned about his ability to return to work  Carmen Jin and and the patient informed me that from his work environment would like him to be at 100% at his baseline  I have informed him that this may take months  It is unpredictable however at this point he is at 70% of his baseline  He does continue to drink on occasion and had three beers last week  A repeat MRI a repeat MRI of the brain showed no abnormalities in the temporal regvions with cuts      Mri of brain 12/23/19 No acute intracranial abnormality or pathologic intracranial enhancement      Normal hippocampal and parahippocampal gyri  No evidence of mesial temporal sclerosis      Nonspecific subcortical frontal white matter signal abnormality  These findings can be seen in patients with migraines and/or chronic microangiopathic disease                      I          The following portions of the patient's history were reviewed and updated as appropriate: He  has no past medical history on file  He  has a past surgical history that includes Cataract extraction (Bilateral); Foot surgery (Right); and Wrist surgery (Left)  His family history includes Diabetes in his maternal aunt; No Known Problems in his father and mother  He  reports that he quit smoking about 5 months ago  His smoking use included cigarettes  He has never used smokeless tobacco  He reports that he drank alcohol  He reports that he does not use drugs    Current Outpatient Medications   Medication Sig Dispense Refill    DULoxetine (CYMBALTA) 60 mg delayed release capsule Take 1 capsule (60 mg total) by mouth daily 30 capsule 2    OXcarbazepine (TRILEPTAL) 300 mg tablet 2 po bid 60 tablet 3    LORazepam (ATIVAN) 0 5 mg tablet Take 1 tablet (0 5 mg total) by mouth every 8 (eight) hours as needed for seizures 10 tablet 0     No current facility-administered medications for this visit  He has No Known Allergies            Objective:    Blood pressure 142/88, pulse 57, height 5' 8" (1 727 m), weight 88 5 kg (195 lb)  Physical Exam   Constitutional: He appears well-developed  HENT:   Head: Normocephalic  Eyes: Pupils are equal, round, and reactive to light  Neurological:   Reflex Scores:       Tricep reflexes are 1+ on the right side and 1+ on the left side  Bicep reflexes are 2+ on the right side and 2+ on the left side  Patellar reflexes are 2+ on the right side and 2+ on the left side  Achilles reflexes are 1+ on the right side and 1+ on the left side  Psychiatric: His speech is normal    Vitals reviewed  Neurological Exam  Mental Status   Oriented to person, place, time and situation  Speech is normal  Language is fluent with no aphasia  Spirits are improved  Cranial Nerves  CN III, IV, VI: Pupils equal round and reactive to light bilaterally  Motor   Strength is 5/5 in all four extremities except as noted  Sensory  Light touch is normal in upper and lower extremities  Temperature is normal in upper and lower extremities       Reflexes                                           Right                      Left  Biceps                                 2+                         2+  Triceps                                1+                         1+  Patellar                                2+                         2+  Achilles                                1+                         1+  Plantar                           Downgoing                Downgoing    Coordination  Right: Finger-to-nose normal  Heel-to-shin normal   Left: Finger-to-nose normal  Heel-to-shin normal     Gait  Normal casual, toe, heel and tandem gait  Able to rise from chair without using arms  Review of systems obtained from the medical assistant as below was reviewed with the patient at today's visit    ROS:    Review of Systems   Constitutional: Negative  Negative for appetite change and fever  HENT: Negative  Negative for hearing loss, tinnitus, trouble swallowing and voice change  Eyes: Positive for visual disturbance  Negative for photophobia and pain  Respiratory: Negative  Negative for shortness of breath  Cardiovascular: Negative  Negative for palpitations  Gastrointestinal: Negative  Negative for nausea and vomiting  Endocrine: Negative  Negative for cold intolerance and heat intolerance  Genitourinary: Negative  Negative for dysuria, frequency and urgency  Musculoskeletal: Negative  Negative for myalgias and neck pain  Skin: Negative  Negative for rash  Allergic/Immunologic: Negative  Neurological: Positive for tremors (left side not as servere), weakness and light-headedness  Negative for dizziness, seizures (auro's yesterday), syncope, facial asymmetry, speech difficulty, numbness and headaches  Hematological: Negative  Does not bruise/bleed easily  Psychiatric/Behavioral: Negative  Negative for confusion, hallucinations and sleep disturbance (night dreams more freg  )

## 2020-01-20 NOTE — ASSESSMENT & PLAN NOTE
Josefa Mckeon is a 16-year-old patient with partial complex seizures  He is currently on Trileptal 300 mg twice a day  He continues to have auras however they have increased they have decreased in frequency and intensity  Today he does report of both good and bad days  Since he has now is back to 70% of his normal   I will increase his Trileptal to 450 mg twice a day  He should have a BMP prior to the next visit  Ativan was provided to the patient for severe or assist     If this proves to be ineffective and he continues to have symptoms or developed severe side effects that we have discussed we could add lamotrigine    Keppra is relatively contraindicated due to his underlying anxiety      He does complain of some intermittent blurred vision  He did have a prior history of cataract removal   Past him to seek an appointment with an ophthalmologist     I have asked him to completely refrain from alcohol and not  Drive  He was agreeable

## 2020-01-20 NOTE — LETTER
January 20, 2020     Patient: Hector Polo   YOB: 1960   Date of Visit: 1/20/2020       To Whom it May Concern:    Jael Walker is under my professional care  He was seen in my office on 1/20/2020  He can not return to work at this time  He will be reevaluated on February 24, 2020 and a decision will be made after next appointment  If you have any questions or concerns, please don't hesitate to call           Sincerely,          Guera Chávez DO        CC: No Recipients

## 2020-01-30 ENCOUNTER — TELEPHONE (OUTPATIENT)
Dept: NEUROLOGY | Facility: CLINIC | Age: 60
End: 2020-01-30

## 2020-01-30 NOTE — TELEPHONE ENCOUNTER
FYI  Patient is calling with an update for Dr Fawad Andino  He states he is "feeling pretty good, the best I've felt since we started " Denies any seizures, states auras are pretty much gone  He is aware to call our office with any problems or concerns

## 2020-01-30 NOTE — TELEPHONE ENCOUNTER
So glad to hear    If he continued to do this well and is 100% back to normal by next week , he can potentially return to work     Please ask him to call in one week

## 2020-01-31 DIAGNOSIS — R68.89 SPELLS OF DECREASED ATTENTIVENESS: ICD-10-CM

## 2020-01-31 DIAGNOSIS — G40.209 EPILEPSY WITH PARTIAL COMPLEX SEIZURES, WITHOUT STATUS EPILEPTICUS (HCC): ICD-10-CM

## 2020-01-31 RX ORDER — OXCARBAZEPINE 300 MG/1
600 TABLET, FILM COATED ORAL EVERY 12 HOURS SCHEDULED
Qty: 120 TABLET | Refills: 5 | Status: SHIPPED | OUTPATIENT
Start: 2020-01-31 | End: 2020-02-24 | Stop reason: SDUPTHER

## 2020-01-31 NOTE — TELEPHONE ENCOUNTER
----- Message from Froilan Lovell sent at 1/30/2020  9:41 PM EST -----  Regarding: Prescription Question  Contact: [phone]  Dr Latasha Chow,   could you change Olman's prescription for OXcarbazepine 300 mg tablet from 60 pills to 120? He is taking 2 tab twice a day  60 pills gives us less then a month supply and the pharmacy will not give us more then 60 pills  We have to refill the current prescription already since he has only 2 more days of pills left      Thank you,  Orion Ayala

## 2020-02-13 ENCOUNTER — TELEPHONE (OUTPATIENT)
Dept: NEUROLOGY | Facility: CLINIC | Age: 60
End: 2020-02-13

## 2020-02-13 NOTE — TELEPHONE ENCOUNTER
Patient calling to let you know that he's feeling really good, but states that he hasn't been back to work yet  He has been talking to his manager, and will require a release to come back to work at 100%  But he does state again that he is feeling really good  Patient is scheduled for follow up on 2/24/20  He will discuss return to work with you at that time  He wanted to give you an update

## 2020-02-19 ENCOUNTER — TELEPHONE (OUTPATIENT)
Dept: NEUROLOGY | Facility: CLINIC | Age: 60
End: 2020-02-19

## 2020-02-24 ENCOUNTER — OFFICE VISIT (OUTPATIENT)
Dept: NEUROLOGY | Facility: CLINIC | Age: 60
End: 2020-02-24
Payer: COMMERCIAL

## 2020-02-24 VITALS
OXYGEN SATURATION: 95 % | WEIGHT: 195.1 LBS | HEART RATE: 66 BPM | HEIGHT: 68 IN | BODY MASS INDEX: 29.57 KG/M2 | DIASTOLIC BLOOD PRESSURE: 70 MMHG | RESPIRATION RATE: 16 BRPM | SYSTOLIC BLOOD PRESSURE: 124 MMHG

## 2020-02-24 DIAGNOSIS — R42 DIZZINESS: ICD-10-CM

## 2020-02-24 DIAGNOSIS — R43.0 ANOSMIA: ICD-10-CM

## 2020-02-24 DIAGNOSIS — R19.7 DIARRHEA, UNSPECIFIED TYPE: Primary | ICD-10-CM

## 2020-02-24 DIAGNOSIS — G40.209 EPILEPSY WITH PARTIAL COMPLEX SEIZURES, WITHOUT STATUS EPILEPTICUS (HCC): ICD-10-CM

## 2020-02-24 DIAGNOSIS — F41.9 ANXIETY: ICD-10-CM

## 2020-02-24 DIAGNOSIS — R68.89 SPELLS OF DECREASED ATTENTIVENESS: ICD-10-CM

## 2020-02-24 DIAGNOSIS — R25.1 TREMOR: ICD-10-CM

## 2020-02-24 DIAGNOSIS — G40.209 PARTIAL SYMPTOMATIC EPILEPSY WITH COMPLEX PARTIAL SEIZURES, NOT INTRACTABLE, WITHOUT STATUS EPILEPTICUS (HCC): ICD-10-CM

## 2020-02-24 PROCEDURE — 3008F BODY MASS INDEX DOCD: CPT | Performed by: PSYCHIATRY & NEUROLOGY

## 2020-02-24 PROCEDURE — 99214 OFFICE O/P EST MOD 30 MIN: CPT | Performed by: PSYCHIATRY & NEUROLOGY

## 2020-02-24 PROCEDURE — 1036F TOBACCO NON-USER: CPT | Performed by: PSYCHIATRY & NEUROLOGY

## 2020-02-24 RX ORDER — DULOXETIN HYDROCHLORIDE 60 MG/1
60 CAPSULE, DELAYED RELEASE ORAL DAILY
Qty: 30 CAPSULE | Refills: 5 | Status: SHIPPED | OUTPATIENT
Start: 2020-02-24 | End: 2020-03-17 | Stop reason: SDUPTHER

## 2020-02-24 RX ORDER — OXCARBAZEPINE 300 MG/1
TABLET, FILM COATED ORAL
Qty: 150 TABLET | Refills: 5 | Status: SHIPPED | OUTPATIENT
Start: 2020-02-24 | End: 2020-03-17 | Stop reason: SDUPTHER

## 2020-02-24 NOTE — ASSESSMENT & PLAN NOTE
He now complains of diarrhea which started over the weekend  I have asked him to utilize diarrhea probiotics and I told him to change his diet  ( bland)   This is probably not related to the Trileptal and less likely to Cymbalta    Previous attempts to reduce the Cymbalta did result in return of his symptoms      I have asked him to contact his family physician for any other suggestions

## 2020-02-24 NOTE — ASSESSMENT & PLAN NOTE
Brett Sheets is doing well  He has had no further auras but cause complain of severe nightmares  This improved with the higher dose of Trileptal but now have returned  He usually has a between 130 and three  Subsequently I have asked Brett Sheets to increase the dose of Trileptal to 600/ 900  If he has had no improvement and he can go back down the 600 twice a day     In the past he did have a sleep apnea test that was negative but he does snore    Subsequently a referral to sleep specialist may be helpful

## 2020-02-24 NOTE — PROGRESS NOTES
Patient ID: Luciana Palomino is a 60 y o  male  Assessment/Plan:    Nonintractable epilepsy with complex partial seizures ([facility] [address] )  Josefa Mckeon is doing well  He has had no further auras but cause complain of severe nightmares  This improved with the higher dose of Trileptal but now have returned  He usually has a between 130 and three  Subsequently I have asked Josefa Mckeon to increase the dose of Trileptal to 600/ 900  If he has had no improvement and he can go back down the 600 twice a day     In the past he did have a sleep apnea test that was negative but he does snore  Subsequently a referral to sleep specialist may be helpful     Diarrhea  He now complains of diarrhea which started over the weekend  I have asked him to utilize diarrhea probiotics and I told him to change his diet  ( bland)   This is probably not related to the Trileptal and less likely to Cymbalta  Previous attempts to reduce the Cymbalta did result in return of his symptoms      I have asked him to contact his family physician for any other suggestions    Today Nubia Negron and  Josefa Mckeon continue to inform me that he his employer  requires him to be at 100 percent without medical issues  At this point with his new complaints he is currently not at  100 percent  Therefore he cannot be released to return to work  I have asked him to utilize probiotics and other options  If the symptoms improved in the next week he can be released to work  I also informed him that in the future recurrence of the symptoms can be quite unpredictable  He does understand this    He has had no auras no seizures and therefore he can return to driving for short distances   Diagnoses and all orders for this visit:    Diarrhea, unspecified type    Spells of decreased attentiveness  -     OXcarbazepine (TRILEPTAL) 300 mg tablet; Take 2 in am and 3 at night    Epilepsy with partial complex seizures, without status epilepticus (HCC)  -     OXcarbazepine (TRILEPTAL) 300 mg tablet;  Take 2 in am and 3 at night    Dizziness  -     DULoxetine (CYMBALTA) 60 mg delayed release capsule; Take 1 capsule (60 mg total) by mouth daily    Tremor  -     DULoxetine (CYMBALTA) 60 mg delayed release capsule; Take 1 capsule (60 mg total) by mouth daily    Anosmia  -     DULoxetine (CYMBALTA) 60 mg delayed release capsule; Take 1 capsule (60 mg total) by mouth daily    Anxiety  -     DULoxetine (CYMBALTA) 60 mg delayed release capsule; Take 1 capsule (60 mg total) by mouth daily    Partial symptomatic epilepsy with complex partial seizures, not intractable, without status epilepticus ([facility] [address] )       He can return to our offices in several months but keep us in phone contact her  Subjective:         this is a 76-year-old patient who presents in a follow up visit   the spells began approximately 24 months ago   At the last visit dose of Trileptal was increase he has some cramping d to 600 mg twice a day  Overall the auras as well seizure activity has significantly improved  He has had minimal symptoms since this increase  With the higher dose of Trileptal but nightmares improved however they have now increased in intensity  They are now occurring on a regular basis  He often describes screaming at night  He also describes a several day history of diarrhea  He also admits to some cramping                       The spells are characterized by sense of the stomach warmth  This then goes up to his stomach  Becomes disoriented he had lost loses feeling in hands and feet  He loses  color in his face and hands This is sometimes a characterized by twitching of the mouth and staring  He will feel tired and fatigued after the episodes  These episodes can last between 1 minutes and 30 minutes  They were occurring intermittently but then increased in frequency  On July 28, 2019 due to the episodes he was seen in the emergency room at [facility]    brain ischemia and/or cardiac etiology was  ruled out   It does limit his ability to drive  He has not been driving  He was then evaluated by his family [de-identified] office in September  At that point he is placed on Cymbalta 30 mg tablets  The dose of Cymbalta was increased to 60 mg a day  It was also felt that he could have a component of partial complex seizures and he did undergo an EEG which was abnormal   It demonstrated bilateral temporal slowing left focal temporal slowing and sharp waves emanating from the left temporal region  He was started on 150 milligrams of Trileptal twice a day and increased to the current dose of 600 twice a day     Past attempts to decrease or discontinue the Cymbalta did result in more spells and irritability               Complaints include some blurred vision but no double vision no ataxia  A overall his spirits have improved            A repeat MRI a repeat MRI of the brain showed no abnormalities in the temporal regvions with cuts        Mri of brain 12/23/19 No acute intracranial abnormality or pathologic intracranial enhancement      Normal hippocampal and parahippocampal gyri  No evidence of mesial temporal sclerosis      Nonspecific subcortical frontal white matter signal abnormality  These findings can be seen in patients with migraines and/or chronic microangiopathic disease                                The following portions of the patient's history were reviewed and updated as appropriate:   He  has no past medical history on file  He  has a past surgical history that includes Cataract extraction (Bilateral); Foot surgery (Right); and Wrist surgery (Left)  His family history includes Diabetes in his maternal aunt; No Known Problems in his father and mother  He  reports that he quit smoking about 6 months ago  His smoking use included cigarettes  He has never used smokeless tobacco  He reports that he drank alcohol  He reports that he does not use drugs    Current Outpatient Medications   Medication Sig Dispense Refill    DULoxetine (CYMBALTA) 60 mg delayed release capsule Take 1 capsule (60 mg total) by mouth daily 30 capsule 5    OXcarbazepine (TRILEPTAL) 300 mg tablet Take 2 in am and 3 at night 150 tablet 5    LORazepam (ATIVAN) 0 5 mg tablet Take 1 tablet (0 5 mg total) by mouth every 8 (eight) hours as needed for seizures (Patient not taking: Reported on 2/24/2020) 10 tablet 0     No current facility-administered medications for this visit  He has No Known Allergies            Objective:    Blood pressure 124/70, pulse 66, resp  rate 16, height 5' 8" (1 727 m), weight 88 5 kg (195 lb 1 6 oz), SpO2 95 %  Physical Exam   Constitutional: He appears well-developed  Eyes: Pupils are equal, round, and reactive to light  Lids are normal    Cardiovascular: Normal rate  Neurological: He has normal strength  Reflex Scores:       Tricep reflexes are 1+ on the right side and 1+ on the left side  Bicep reflexes are 2+ on the right side and 2+ on the left side  Patellar reflexes are 2+ on the right side and 2+ on the left side  Achilles reflexes are 1+ on the right side and 1+ on the left side  Psychiatric: His speech is normal    Vitals reviewed  Neurological Exam  Mental Status  Awake, alert and oriented to person, place and time  Speech is normal  Language is fluent with no aphasia  Cranial Nerves  CN II: Visual acuity is normal  Visual fields full to confrontation  CN III, IV, VI: Extraocular movements intact bilaterally  Normal lids and orbits bilaterally  Pupils equal round and reactive to light bilaterally  CN V: Facial sensation is normal   CN VII: Full and symmetric facial movement  CN VIII: Hearing is normal   CN IX, X: Palate elevates symmetrically  Normal gag reflex  CN XI: Shoulder shrug strength is normal   CN XII: Tongue midline without atrophy or fasciculations  Motor  Normal muscle bulk throughout  No fasciculations present   Strength is 5/5 throughout all four extremities  Sensory  Light touch is normal in upper and lower extremities  Temperature is normal in upper and lower extremities  Reflexes                                           Right                      Left  Biceps                                 2+                         2+  Triceps                                1+                         1+  Patellar                                2+                         2+  Achilles                                1+                         1+  Plantar                           Downgoing                Downgoing    Right pathological reflexes: Di's absent  Left pathological reflexes: Di's absent  Coordination  Right: Finger-to-nose normal   Left: Finger-to-nose normal     Gait  Normal casual, toe, heel and tandem gait  Review of systems obtained from the medical assistant as below was reviewed with the patient at today's visit  ROS:    Review of Systems   Constitutional: Negative  Negative for appetite change and fever  HENT: Negative  Negative for hearing loss, tinnitus, trouble swallowing and voice change  Eyes: Negative  Negative for photophobia and pain  Respiratory: Negative  Negative for shortness of breath  Cardiovascular: Negative  Negative for palpitations  Gastrointestinal: Positive for diarrhea (friquent )  Negative for nausea and vomiting  Endocrine: Negative  Negative for cold intolerance and heat intolerance  Genitourinary: Negative  Negative for dysuria, frequency and urgency  Musculoskeletal: Negative  Negative for myalgias and neck pain  Skin: Negative  Negative for rash  Allergic/Immunologic: Negative  Neurological: Negative  Negative for dizziness, tremors, seizures, syncope, facial asymmetry, speech difficulty, weakness, light-headedness, numbness and headaches  Hematological: Negative  Does not bruise/bleed easily  Psychiatric/Behavioral: Positive for sleep disturbance (nightmares )  Negative for confusion and hallucinations  All other systems reviewed and are negative

## 2020-02-24 NOTE — LETTER
February 24, 2020     Patient: Akash Fine   YOB: 1960   Date of Visit: 2/24/2020       To Whom it May Concern:    Tamara Phillips is under my professional care  He was seen in my office on 2/24/2020  He can not return to work at this time  He will be reevaluated on March 6, 2020 and a decision will be made after next appointment  If you have any questions or concerns, please don't hesitate to call           Sincerely,          Reny Smyth DO        CC: No Recipients

## 2020-02-25 ENCOUNTER — OFFICE VISIT (OUTPATIENT)
Dept: FAMILY MEDICINE CLINIC | Facility: CLINIC | Age: 60
End: 2020-02-25
Payer: COMMERCIAL

## 2020-02-25 VITALS
BODY MASS INDEX: 29.92 KG/M2 | HEIGHT: 68 IN | DIASTOLIC BLOOD PRESSURE: 74 MMHG | HEART RATE: 66 BPM | OXYGEN SATURATION: 96 % | TEMPERATURE: 97.8 F | SYSTOLIC BLOOD PRESSURE: 138 MMHG | RESPIRATION RATE: 18 BRPM | WEIGHT: 197.4 LBS

## 2020-02-25 DIAGNOSIS — K52.9 CHRONIC DIARRHEA: Primary | ICD-10-CM

## 2020-02-25 PROCEDURE — 99214 OFFICE O/P EST MOD 30 MIN: CPT | Performed by: FAMILY MEDICINE

## 2020-02-25 PROCEDURE — 3008F BODY MASS INDEX DOCD: CPT | Performed by: FAMILY MEDICINE

## 2020-02-25 PROCEDURE — 1036F TOBACCO NON-USER: CPT | Performed by: FAMILY MEDICINE

## 2020-02-25 RX ORDER — MULTIVIT-MINERALS/FOLIC ACID 200 MCG
1 TABLET,CHEWABLE ORAL DAILY
COMMUNITY
End: 2021-03-15

## 2020-02-25 RX ORDER — SACCHAROMYCES BOULARDII 250 MG
250 CAPSULE ORAL 2 TIMES DAILY
COMMUNITY
End: 2020-02-25 | Stop reason: ALTCHOICE

## 2020-02-25 NOTE — PROGRESS NOTES
Assessment/Plan:   1  Chronic diarrhea  Reviewed patient's symptoms today  At this time, is unclear as to the exact cause of his chronic diarrhea  At this time, symptoms appear mild  Reviewed the differential of possible causes with him  He was advised today that certain medications may cause diarrhea  Common side effects from Trileptal as well as his Cymbalta are diarrhea  He believes that his symptoms did start after he initiated treatment with Cymbalta  He has tried weaning off this in the past however has had difficulty  At this time, he was advised he may benefit from weaning if agreeable with Neurology  Will check stool studies to rule out other abnormalities  If symptoms are not improving, he may benefit from seeing Gastroenterology to flu further assess if there is any other possible causes such as celiac/lactose intolerance versus inflammatory bowel disease   - Stool Enteric Bacterial Panel by PCR; Future  - Clostridium difficile toxin by PCR with EIA; Future  - Ova and parasite examination; Future           There are no diagnoses linked to this encounter  Subjective:       Chief Complaint   Patient presents with    Diarrhea     Neurology suggested Pt follow up in regars to diarrhea being a reaction to medication       Patient ID: Michael Adams is a 60 y o  male  Patient is a 70-year-old male presents today with a CC of diarrhea  He states he has been having frequent loose bowel movements for the past month  Symptoms started gradually  He denies any specific cause for symptoms  He denies any abdominal pain  He denies melena or hematochezia  He currently has not been taking any medications for symptom relief  He was advised that he would benefit from taking a probiotic  He states he has been taking this and this has improved his symptoms mildly  Review of Systems   Constitutional: Negative for activity change, chills, fatigue and fever     HENT: Negative for congestion, ear pain, sinus pressure and sore throat  Eyes: Negative for redness, itching and visual disturbance  Respiratory: Negative for cough and shortness of breath  Cardiovascular: Negative for chest pain and palpitations  Gastrointestinal: Negative for abdominal pain, diarrhea and nausea  Endocrine: Negative for cold intolerance and heat intolerance  Genitourinary: Negative for dysuria, flank pain and frequency  Musculoskeletal: Negative for arthralgias, back pain, gait problem and myalgias  Skin: Negative for color change  Allergic/Immunologic: Negative for environmental allergies  Neurological: Negative for dizziness, numbness and headaches  Psychiatric/Behavioral: Negative for behavioral problems and sleep disturbance  The following portions of the patient's history were reviewed and updated as appropriate : past family history, past medical history, past social history and past surgical history  Current Outpatient Medications:     DULoxetine (CYMBALTA) 60 mg delayed release capsule, Take 1 capsule (60 mg total) by mouth daily, Disp: 30 capsule, Rfl: 5    OXcarbazepine (TRILEPTAL) 300 mg tablet, Take 2 in am and 3 at night, Disp: 150 tablet, Rfl: 5    Probiotic Product ([employer] PROBIOTIC MAXIMUM STRENGTH) CAPS, Take 1 capsule by mouth daily, Disp: , Rfl:     LORazepam (ATIVAN) 0 5 mg tablet, Take 1 tablet (0 5 mg total) by mouth every 8 (eight) hours as needed for seizures (Patient not taking: Reported on 2/24/2020), Disp: 10 tablet, Rfl: 0         Objective:     Vitals:    02/25/20 1807   BP: 138/74   BP Location: Left arm   Patient Position: Sitting   Cuff Size: Adult   Pulse: 66   Resp: 18   Temp: 97 8 °F (36 6 °C)   TempSrc: Tympanic   SpO2: 96%   Weight: 89 5 kg (197 lb 6 4 oz)   Height: 5' 8" (1 727 m)     Physical Exam   Constitutional: He is oriented to person, place, and time  He appears well-developed and well-nourished  HENT:   Head: Normocephalic and atraumatic     Nose: Nose normal    Mouth/Throat: No oropharyngeal exudate  Eyes: Pupils are equal, round, and reactive to light  Right eye exhibits no discharge  Left eye exhibits no discharge  Neck: Normal range of motion  Neck supple  No tracheal deviation present  Cardiovascular: Normal rate, regular rhythm and intact distal pulses  Exam reveals no gallop and no friction rub  No murmur heard  Pulses:       Dorsalis pedis pulses are 2+ on the right side, and 2+ on the left side  Posterior tibial pulses are 2+ on the right side, and 2+ on the left side  Pulmonary/Chest: Effort normal and breath sounds normal  No respiratory distress  He has no wheezes  He has no rales  Abdominal: Soft  Bowel sounds are normal  He exhibits no distension  There is no tenderness  There is no rebound and no guarding  Musculoskeletal: Normal range of motion  He exhibits no edema  Lymphadenopathy:        Head (right side): No submental and no submandibular adenopathy present  Head (left side): No submental and no submandibular adenopathy present  He has no cervical adenopathy  Right cervical: No superficial cervical, no deep cervical and no posterior cervical adenopathy present  Left cervical: No superficial cervical, no deep cervical and no posterior cervical adenopathy present  Neurological: He is alert and oriented to person, place, and time  No cranial nerve deficit or sensory deficit  Skin: Skin is warm, dry and intact  Psychiatric: His speech is normal and behavior is normal  Judgment normal  His mood appears not anxious  Cognition and memory are normal  He does not exhibit a depressed mood  Vitals reviewed

## 2020-02-28 ENCOUNTER — TELEPHONE (OUTPATIENT)
Dept: NEUROLOGY | Facility: CLINIC | Age: 60
End: 2020-02-28

## 2020-02-28 NOTE — TELEPHONE ENCOUNTER
Patient reports he is doing very well  States he is taking his medications as prescribed and believes the probiotics have helped with his diarrhea  Denies any seizures or diarrhea at this time  He is asking if he can return to work on Monday  States he would need a letter stating that he can return  This can be sent to his MyChart  Please advise       [phone]  Okay to leave detailed message

## 2020-02-28 NOTE — LETTER
February 28, 2020     Michelle Hale    Patient: Michelle Hale   YOB: 1960           To Whom it May Concern,    Alden Stevens is under my professional care  Gilbert Guallpa may to return to work starting 03/02/2020  If you have any questions please do not hesitate to call our office      Sincerely,    Rosanne Alonso, DO

## 2020-02-28 NOTE — TELEPHONE ENCOUNTER
Yes  He can return to work   Zoe Salazar  Please write the note to indicate that he is cleared to return to work

## 2020-03-16 DIAGNOSIS — R25.1 TREMOR: ICD-10-CM

## 2020-03-16 DIAGNOSIS — G40.209 EPILEPSY WITH PARTIAL COMPLEX SEIZURES, WITHOUT STATUS EPILEPTICUS (HCC): ICD-10-CM

## 2020-03-16 DIAGNOSIS — R43.0 ANOSMIA: ICD-10-CM

## 2020-03-16 DIAGNOSIS — F41.9 ANXIETY: ICD-10-CM

## 2020-03-16 DIAGNOSIS — R68.89 SPELLS OF DECREASED ATTENTIVENESS: ICD-10-CM

## 2020-03-16 DIAGNOSIS — R42 DIZZINESS: ICD-10-CM

## 2020-03-17 NOTE — TELEPHONE ENCOUNTER
Jack Cade calling regarding prescriptions  Asking for cymbalta and trileptal to be sent to [facility] due to cost and insurance

## 2020-03-18 RX ORDER — OXCARBAZEPINE 300 MG/1
TABLET, FILM COATED ORAL
Qty: 450 TABLET | Refills: 1 | Status: SHIPPED | OUTPATIENT
Start: 2020-03-18 | End: 2020-09-08

## 2020-03-18 RX ORDER — DULOXETIN HYDROCHLORIDE 60 MG/1
60 CAPSULE, DELAYED RELEASE ORAL DAILY
Qty: 90 CAPSULE | Refills: 1 | Status: SHIPPED | OUTPATIENT
Start: 2020-03-18 | End: 2020-09-08

## 2020-03-20 ENCOUNTER — TELEPHONE (OUTPATIENT)
Dept: INTERNAL MEDICINE CLINIC | Facility: CLINIC | Age: 60
End: 2020-03-20

## 2020-03-20 NOTE — TELEPHONE ENCOUNTER
----- Message from Brandin Carranza sent at 3/19/2020  5:53 PM EDT -----  Regarding: Prescription Question  Contact: [phone]  Rl Rivas, i think i may have talked to you the other day regarding Olman's prescription  I was told by the mail order pharmacy that the got the request fir the meds, but no prescription  They stated that you have to fax the prescription to [phone]  The phone number is pp [phone]  Please let me know if you need anything else  Thank you for your help  You can call me at [phone] should you need other information     Javon Murcia

## 2020-05-15 ENCOUNTER — TELEPHONE (OUTPATIENT)
Dept: NEUROLOGY | Facility: CLINIC | Age: 60
End: 2020-05-15

## 2020-05-18 ENCOUNTER — OFFICE VISIT (OUTPATIENT)
Dept: NEUROLOGY | Facility: CLINIC | Age: 60
End: 2020-05-18
Payer: COMMERCIAL

## 2020-05-18 VITALS
BODY MASS INDEX: 29.73 KG/M2 | HEART RATE: 72 BPM | WEIGHT: 195.5 LBS | DIASTOLIC BLOOD PRESSURE: 72 MMHG | SYSTOLIC BLOOD PRESSURE: 128 MMHG

## 2020-05-18 DIAGNOSIS — R19.7 DIARRHEA, UNSPECIFIED TYPE: ICD-10-CM

## 2020-05-18 DIAGNOSIS — G40.209 PARTIAL SYMPTOMATIC EPILEPSY WITH COMPLEX PARTIAL SEIZURES, NOT INTRACTABLE, WITHOUT STATUS EPILEPTICUS (HCC): Primary | ICD-10-CM

## 2020-05-18 PROCEDURE — 1036F TOBACCO NON-USER: CPT | Performed by: PSYCHIATRY & NEUROLOGY

## 2020-05-18 PROCEDURE — 99213 OFFICE O/P EST LOW 20 MIN: CPT | Performed by: PSYCHIATRY & NEUROLOGY

## 2020-06-04 ENCOUNTER — TELEPHONE (OUTPATIENT)
Dept: NEUROLOGY | Facility: CLINIC | Age: 60
End: 2020-06-04

## 2020-06-12 ENCOUNTER — TELEPHONE (OUTPATIENT)
Dept: NEUROLOGY | Facility: CLINIC | Age: 60
End: 2020-06-12

## 2020-06-30 ENCOUNTER — PATIENT MESSAGE (OUTPATIENT)
Dept: FAMILY MEDICINE CLINIC | Facility: CLINIC | Age: 60
End: 2020-06-30

## 2020-07-24 ENCOUNTER — OFFICE VISIT (OUTPATIENT)
Dept: FAMILY MEDICINE CLINIC | Facility: CLINIC | Age: 60
End: 2020-07-24
Payer: COMMERCIAL

## 2020-07-24 VITALS
DIASTOLIC BLOOD PRESSURE: 68 MMHG | HEART RATE: 61 BPM | HEIGHT: 68 IN | TEMPERATURE: 97.2 F | SYSTOLIC BLOOD PRESSURE: 128 MMHG | BODY MASS INDEX: 29.01 KG/M2 | WEIGHT: 191.4 LBS | RESPIRATION RATE: 17 BRPM | OXYGEN SATURATION: 96 %

## 2020-07-24 DIAGNOSIS — R73.09 ELEVATED GLUCOSE LEVEL: Primary | ICD-10-CM

## 2020-07-24 DIAGNOSIS — G56.03 BILATERAL CARPAL TUNNEL SYNDROME: ICD-10-CM

## 2020-07-24 LAB — SL AMB POCT HEMOGLOBIN AIC: 5.6 (ref ?–6.5)

## 2020-07-24 PROCEDURE — 99214 OFFICE O/P EST MOD 30 MIN: CPT | Performed by: FAMILY MEDICINE

## 2020-07-24 PROCEDURE — 3008F BODY MASS INDEX DOCD: CPT | Performed by: FAMILY MEDICINE

## 2020-07-24 PROCEDURE — 1036F TOBACCO NON-USER: CPT | Performed by: FAMILY MEDICINE

## 2020-07-24 PROCEDURE — 83036 HEMOGLOBIN GLYCOSYLATED A1C: CPT | Performed by: FAMILY MEDICINE

## 2020-07-24 NOTE — PROGRESS NOTES
Assessment/Plan:   1  Elevated glucose level  Reviewed patient's previous blood work with him  His fasting blood sugar was minimally elevated  At this time, his A1c level in the office today was 5 6  He was advised that this level is considered truly normal   At this time, he was advised to continue with his normal exercise and dietary measures  No further testing needed at this moment  - POCT hemoglobin A1c    2  Bilateral carpal tunnel syndrome  Patient's symptoms appear likely secondary to possible bilateral carpal tunnel syndrome  He was educated on pathophysiology is problem  At this time, he was instructed on specific stretching exercises  He was also educated on massaging techniques for this area as well  He may use bilateral wrist braces for carpal tunnel at bedtime when he is sleeping  If any symptoms are worsening, he was advised to call or follow up  Diagnoses and all orders for this visit:    Elevated glucose level  -     POCT hemoglobin A1c          Subjective:       Chief Complaint   Patient presents with    Follow-up     high glucose      Patient ID: Yashira Shaw is a 63 y o  male  Patient is a 57-year-old male presents today to review reviewed his previous blood work completed by his specialist   He would also like to have an A1c level today  He was informed that his blood sugar levels have been mildly elevated  He states that he denies any recent changes in his weight or exercise significantly  Patient has secondary complaints of bilateral wrist discomfort as well as numbness and tingling  He has had this for the past few months  Symptoms started gradually  He states that the symptoms are worsen as he is using his equipment at work with stands  He notes that he does use equipment that has high vibration and is concerned that this may be causing his symptoms  Review of Systems   Constitutional: Negative for activity change, chills, fatigue and fever     HENT: Negative for congestion, ear pain, sinus pressure and sore throat  Eyes: Negative for redness, itching and visual disturbance  Respiratory: Negative for cough and shortness of breath  Cardiovascular: Negative for chest pain and palpitations  Gastrointestinal: Negative for abdominal pain, diarrhea and nausea  Endocrine: Negative for cold intolerance and heat intolerance  Genitourinary: Negative for dysuria, flank pain and frequency  Musculoskeletal: Negative for arthralgias, back pain, gait problem and myalgias  Skin: Negative for color change  Allergic/Immunologic: Negative for environmental allergies  Neurological: Negative for dizziness, numbness and headaches  Psychiatric/Behavioral: Negative for behavioral problems and sleep disturbance  The following portions of the patient's history were reviewed and updated as appropriate : past family history, past medical history, past social history and past surgical history  Current Outpatient Medications:     DULoxetine (CYMBALTA) 60 mg delayed release capsule, Take 1 capsule (60 mg total) by mouth daily, Disp: 90 capsule, Rfl: 1    OXcarbazepine (TRILEPTAL) 300 mg tablet, Take 2 tablets (600mg) by mouth in am and 3 tablets (900mg) at night  3 month supply, Disp: 450 tablet, Rfl: 1    Probiotic Product ([employer] PROBIOTIC MAXIMUM STRENGTH) CAPS, Take 1 capsule by mouth daily, Disp: , Rfl:     LORazepam (ATIVAN) 0 5 mg tablet, Take 1 tablet (0 5 mg total) by mouth every 8 (eight) hours as needed for seizures (Patient not taking: Reported on 7/24/2020), Disp: 10 tablet, Rfl: 0         Objective:         Vitals:    07/24/20 1631   BP: 128/68   BP Location: Right arm   Patient Position: Sitting   Cuff Size: Adult   Pulse: 61   Resp: 17   Temp: (!) 97 2 °F (36 2 °C)   TempSrc: Tympanic   SpO2: 96%   Weight: 86 8 kg (191 lb 6 4 oz)   Height: 5' 8" (1 727 m)     Physical Exam   Constitutional: He is oriented to person, place, and time   He appears well-developed and well-nourished  HENT:   Head: Normocephalic and atraumatic  Nose: Nose normal    Mouth/Throat: No oropharyngeal exudate  Eyes: Pupils are equal, round, and reactive to light  Right eye exhibits no discharge  Left eye exhibits no discharge  Neck: Normal range of motion  Neck supple  No tracheal deviation present  Cardiovascular: Normal rate, regular rhythm and intact distal pulses  Exam reveals no gallop and no friction rub  No murmur heard  Pulses:       Dorsalis pedis pulses are 2+ on the right side, and 2+ on the left side  Posterior tibial pulses are 2+ on the right side, and 2+ on the left side  Pulmonary/Chest: Effort normal and breath sounds normal  No respiratory distress  He has no wheezes  He has no rales  Abdominal: Soft  Bowel sounds are normal  He exhibits no distension  There is no tenderness  There is no rebound and no guarding  Musculoskeletal: Normal range of motion  He exhibits no edema  Lymphadenopathy:        Head (right side): No submental and no submandibular adenopathy present  Head (left side): No submental and no submandibular adenopathy present  He has no cervical adenopathy  Right cervical: No superficial cervical, no deep cervical and no posterior cervical adenopathy present  Left cervical: No superficial cervical, no deep cervical and no posterior cervical adenopathy present  Neurological: He is alert and oriented to person, place, and time  No cranial nerve deficit or sensory deficit  Skin: Skin is warm, dry and intact  Psychiatric: His speech is normal and behavior is normal  Judgment normal  His mood appears not anxious  Cognition and memory are normal  He does not exhibit a depressed mood  Vitals reviewed

## 2020-09-08 DIAGNOSIS — G40.209 EPILEPSY WITH PARTIAL COMPLEX SEIZURES, WITHOUT STATUS EPILEPTICUS (HCC): ICD-10-CM

## 2020-09-08 DIAGNOSIS — F41.9 ANXIETY: ICD-10-CM

## 2020-09-08 DIAGNOSIS — R43.0 ANOSMIA: ICD-10-CM

## 2020-09-08 DIAGNOSIS — R42 DIZZINESS: ICD-10-CM

## 2020-09-08 DIAGNOSIS — R25.1 TREMOR: ICD-10-CM

## 2020-09-08 DIAGNOSIS — R68.89 SPELLS OF DECREASED ATTENTIVENESS: ICD-10-CM

## 2020-09-08 RX ORDER — DULOXETIN HYDROCHLORIDE 60 MG/1
CAPSULE, DELAYED RELEASE ORAL
Qty: 90 CAPSULE | Refills: 1 | Status: SHIPPED | OUTPATIENT
Start: 2020-09-08 | End: 2021-02-14 | Stop reason: SDUPTHER

## 2020-09-08 RX ORDER — OXCARBAZEPINE 300 MG/1
TABLET, FILM COATED ORAL
Qty: 450 TABLET | Refills: 1 | Status: SHIPPED | OUTPATIENT
Start: 2020-09-08 | End: 2021-02-14 | Stop reason: SDUPTHER

## 2020-09-28 ENCOUNTER — TELEPHONE (OUTPATIENT)
Dept: NEUROLOGY | Facility: CLINIC | Age: 60
End: 2020-09-28

## 2020-09-28 NOTE — TELEPHONE ENCOUNTER
----- Message from Laura President sent at 9/25/2020  6:14 PM EDT -----  Regarding: Visit Follow-Up Question  Contact: [phone]  We have a scheduled apt 9/28/20 at 8:30 am but I do not see it on the visit list and no one called to confirm the apt   Can someone confirm that we still have the apt?

## 2020-09-28 NOTE — TELEPHONE ENCOUNTER
Patient came to [facility] office today  He stated that on his last visit we had a power outage and we could not book his appointment after his visit  Appointment was Johana Aschoff as note on his AVS   I apologized for the inconvenience and scheduled him for the next available  I also put him on the [facility] 4 patient appointment reminder card  (Management is aware)

## 2020-09-29 ENCOUNTER — TELEPHONE (OUTPATIENT)
Dept: NEUROLOGY | Facility: CLINIC | Age: 60
End: 2020-09-29

## 2020-09-29 NOTE — TELEPHONE ENCOUNTER
Patient's spouse Myla Schwab called back to speak with Maranda Ascencio, Elver Aguirre regarding the possible appointments that she was going to offer her   Spoke with Elver Kiran and she stated to offer the patient tomorrow at [facility] 12PM or 2PM with Dr Boston Appl  Spouse declined the appointments offered and stated that they will keep their 11/2/2020 appt because patient is unable to get off work again since he was off yesterday  She appreciated the office for trying to get her  in sooner

## 2020-10-16 ENCOUNTER — TELEPHONE (OUTPATIENT)
Dept: FAMILY MEDICINE CLINIC | Facility: CLINIC | Age: 60
End: 2020-10-16

## 2020-10-16 DIAGNOSIS — G56.03 BILATERAL CARPAL TUNNEL SYNDROME: Primary | ICD-10-CM

## 2020-11-02 ENCOUNTER — OFFICE VISIT (OUTPATIENT)
Dept: NEUROLOGY | Facility: CLINIC | Age: 60
End: 2020-11-02
Payer: COMMERCIAL

## 2020-11-02 VITALS
HEART RATE: 55 BPM | BODY MASS INDEX: 29.99 KG/M2 | TEMPERATURE: 97.3 F | SYSTOLIC BLOOD PRESSURE: 140 MMHG | HEIGHT: 68 IN | WEIGHT: 197.9 LBS | DIASTOLIC BLOOD PRESSURE: 78 MMHG

## 2020-11-02 DIAGNOSIS — G56.03 CARPAL TUNNEL SYNDROME, BILATERAL: Primary | ICD-10-CM

## 2020-11-02 DIAGNOSIS — G40.209 PARTIAL SYMPTOMATIC EPILEPSY WITH COMPLEX PARTIAL SEIZURES, NOT INTRACTABLE, WITHOUT STATUS EPILEPTICUS (HCC): ICD-10-CM

## 2020-11-02 PROCEDURE — 1036F TOBACCO NON-USER: CPT | Performed by: PSYCHIATRY & NEUROLOGY

## 2020-11-02 PROCEDURE — 99214 OFFICE O/P EST MOD 30 MIN: CPT | Performed by: PSYCHIATRY & NEUROLOGY

## 2020-11-05 ENCOUNTER — OFFICE VISIT (OUTPATIENT)
Dept: OBGYN CLINIC | Facility: MEDICAL CENTER | Age: 60
End: 2020-11-05
Payer: COMMERCIAL

## 2020-11-05 VITALS
SYSTOLIC BLOOD PRESSURE: 133 MMHG | WEIGHT: 198 LBS | TEMPERATURE: 98.9 F | HEART RATE: 69 BPM | HEIGHT: 68 IN | DIASTOLIC BLOOD PRESSURE: 71 MMHG | BODY MASS INDEX: 30.01 KG/M2

## 2020-11-05 DIAGNOSIS — G56.03 BILATERAL CARPAL TUNNEL SYNDROME: ICD-10-CM

## 2020-11-05 PROCEDURE — 3008F BODY MASS INDEX DOCD: CPT | Performed by: ORTHOPAEDIC SURGERY

## 2020-11-05 PROCEDURE — 1036F TOBACCO NON-USER: CPT | Performed by: ORTHOPAEDIC SURGERY

## 2020-11-05 PROCEDURE — 99244 OFF/OP CNSLTJ NEW/EST MOD 40: CPT | Performed by: ORTHOPAEDIC SURGERY

## 2020-12-14 ENCOUNTER — TELEPHONE (OUTPATIENT)
Dept: OBGYN CLINIC | Facility: MEDICAL CENTER | Age: 60
End: 2020-12-14

## 2021-01-13 ENCOUNTER — OFFICE VISIT (OUTPATIENT)
Dept: OBGYN CLINIC | Facility: MEDICAL CENTER | Age: 61
End: 2021-01-13
Payer: COMMERCIAL

## 2021-01-13 VITALS
WEIGHT: 197 LBS | HEIGHT: 68 IN | HEART RATE: 74 BPM | BODY MASS INDEX: 29.86 KG/M2 | DIASTOLIC BLOOD PRESSURE: 72 MMHG | SYSTOLIC BLOOD PRESSURE: 122 MMHG

## 2021-01-13 DIAGNOSIS — G56.03 BILATERAL CARPAL TUNNEL SYNDROME: Primary | ICD-10-CM

## 2021-01-13 DIAGNOSIS — G56.23 CUBITAL TUNNEL SYNDROME, BILATERAL: ICD-10-CM

## 2021-01-13 PROCEDURE — 99214 OFFICE O/P EST MOD 30 MIN: CPT | Performed by: ORTHOPAEDIC SURGERY

## 2021-01-13 PROCEDURE — 1036F TOBACCO NON-USER: CPT | Performed by: ORTHOPAEDIC SURGERY

## 2021-01-13 PROCEDURE — 3008F BODY MASS INDEX DOCD: CPT | Performed by: ORTHOPAEDIC SURGERY

## 2021-01-13 NOTE — PROGRESS NOTES
Chief Complaint     Bilateral hand numbness, tingling and pain       History of Present Illness     Akash Fine is a 63 y o  male presents today with continued bilateral hand numbness, tingling and pain  Patient stated that his numbness and tingling have not improved since last visit  He stated that his numbness has now increased to all four digits and his thumb bilaterally  He stated that he has been wearing his wife's wrist braces at night since his last visit on 11/5/2020 and has had improvement with pain  Patient stated that he no longer wakes up at night in pain  He stated that he only experiences pain when he is in a push up position  Past Medical History:   Diagnosis Date    Anxiety     Seizure disorder [facility])        Past Surgical History:   Procedure Laterality Date    APPENDECTOMY      CATARACT EXTRACTION Bilateral     COLONOSCOPY  2010    normal    EGD  08/2019    H  pylori gastritis, treated with ampicillin, clarithromycin and omeprazole    FOOT SURGERY Right     WRIST SURGERY Left        No Known Allergies    Current Outpatient Medications on File Prior to Visit   Medication Sig Dispense Refill    DULoxetine (CYMBALTA) 60 mg delayed release capsule TAKE ONE CAPSULE BY MOUTH DAILY 90 capsule 1    OXcarbazepine (TRILEPTAL) 300 mg tablet TAKE 2 TABLETS BY MOUTH IN THE MORNING AND 3 TABLETS AT NIGHT 450 tablet 1    Probiotic Product ([employer] PROBIOTIC MAXIMUM STRENGTH) CAPS Take 1 capsule by mouth daily      LORazepam (ATIVAN) 0 5 mg tablet Take 1 tablet (0 5 mg total) by mouth every 8 (eight) hours as needed for seizures (Patient not taking: Reported on 7/24/2020) 10 tablet 0    Suprep Bowel Prep Kit 17 5-3 13-1 6 GM/177ML SOLN Take 1 Bottle by mouth once for 1 dose 1 Bottle 0     No current facility-administered medications on file prior to visit          Social History     Tobacco Use    Smoking status: Former Smoker     Types: Cigarettes     Quit date: 7/28/2019     Years since quittin 4    Smokeless tobacco: Never Used   Substance Use Topics    Alcohol use: Not Currently     Comment: occasional    Drug use: No       Family History   Problem Relation Age of Onset    No Known Problems Mother     No Known Problems Father     Diabetes Maternal Aunt     Breast cancer Sister     Colon cancer Neg Hx        Review of Systems     As stated in the HPI  All other systems were reviewed and are negative  Physical Exam     /72   Pulse 74   Ht 5' 8" (1 727 m)   Wt 89 4 kg (197 lb)   BMI 29 95 kg/m²     GENERAL: This is a well-developed, well-nourished, age-appropriate patient in no acute distress  The patient is alert and oriented x3  Pleasant and cooperative  Eyes: Anicteric sclerae  Extraocular movements appear intact  HENT: Nares are patent with no drainage  Lungs: There is equal chest rise on inspection  Breathing is non-labored with no audible wheezing  Cardiovascular: No cyanosis  No upper extremity lymphadema  Skin: Skin is warm to touch  No obvious skin lesions or rashes other than described below  Neurologic: No ataxia  Psychiatric: Mood and affect are appropriate  Bilateral Upper Extremity:   Skin intact  No atrophy  No swelling noted  No erythema or ecchymosis   DPC 0 Bilaterally   + Tinel's at carpal tunnel   + Tinel's at cubital tunnel   + Durkans compression test (R); negative (L)  + flexion compression test bilaterally  5/5 Motor to the APB, FDI, FDP2, FDP5, EDC  Sensation intact to light touch in the median, radial, and ulnar nerve distribution    Brisk capillary refill     Data Review     Results Reviewed     None             Imaging:  No new images today    Assessment and Plan      Diagnoses and all orders for this visit:    Bilateral carpal tunnel syndrome    Cubital tunnel syndrome, bilateral         63 y o  male presents with both bilateral carpal now with definitive symptomatology and exam suggestive of cubital syndrome    We discussed the etiology and natural course of cubital tunnel syndrome at the elbow  This is related to compression of the ulnar nerve at or around the medial epicondyle or FCU fascia  Compression typically results in numbness to the hand, pain, and occasionally weakness  We discussed that there are nonoperative and operative modalities for treatment and at the majority of patients benefit from non operative modalities  Typically, this involves the night splinting and ergonomic adjustments to prevent direct pressure or compression of the ulnar nerve at the elbow  Surgical treatment can involve in situ decompression or transposition if the nerve is unstable  I discussed with patient that he may continue night time bracing with his wrist braces  He may also add towel splinting at night for his bilateral cubital tunnel syndrome  We discussed both surgical and non surgical options  Patient would like to proceed with night time splinting at this time  Patient will come back to the office in 6 weeks for re examination and to discuss possible surgical options at this time           Follow Up: 6 weeks    To Do Next Visit: re examine bilateral hands and elbows    PROCEDURES PERFORMED:  Procedures  No Procedures performed today        Scribe Attestation    I,:  Beatrice Sepulveda am acting as a scribe while in the presence of the attending physician :       I,:  Javier Limon MD personally performed the services described in this documentation    as scribed in my presence :

## 2021-01-14 ENCOUNTER — TELEPHONE (OUTPATIENT)
Dept: NEUROLOGY | Facility: CLINIC | Age: 61
End: 2021-01-14

## 2021-01-14 ENCOUNTER — TELEPHONE (OUTPATIENT)
Dept: OTHER | Facility: HOSPITAL | Age: 61
End: 2021-01-14

## 2021-01-14 DIAGNOSIS — G56.03 CARPAL TUNNEL SYNDROME, BILATERAL: Primary | ICD-10-CM

## 2021-01-14 NOTE — TELEPHONE ENCOUNTER
Call received from [facility]  They spoke to patient  Patient reported to them a "seizure" type episode that occurred in October time frame  Patient states this was due to mixing up medications  Described by wife as a "transe"/staring type episode  Due to this event endoscopy lab is requesting a clearance letter as patient is scheduled for colonoscopy tomorrow - 1/15/2021  Are you agreeable to letter as below? To Whom It May Concern:    Brandan Morales was last seen in office 11/2/2020  He is cleared from a neurological perspective for his colonoscopy scheduled for 1/15/2021  Please feel free to call our office with further questions or concerns  Letter to be faxed to [phone]  Phone: [phone]

## 2021-01-14 NOTE — TELEPHONE ENCOUNTER
Please schedule him for an EMG at the [address] available on a Tuesday and Thursday I will also place him on a cancellation

## 2021-01-14 NOTE — TELEPHONE ENCOUNTER
Spoke to wife     Yasmine Celaya has a  a history of partial complex seizures and is on Trileptal    His seizures are well controlled on the current dose of 600 in the morning 900 at bedtime  He has not had a seizure for more than four months  At the last visit in November he described no seizures  He does describe an aura that occurred on the approximately  2 and 1/2 months ago  when he had taken his Trileptal later in the day  Nasrin Romero , his wife did confirm this     So he is cleared a for colonoscopy  Please send the note as below  But add that he does need to take his Trileptal as is home schedule warrants    I spoke to Yaw Hemphill about obtaining an EMG    He does have carpal tunnel syndrome and he was recently seen by Orthopedics with thought maybe he also has cubital tunnel syndrome they did agree to an EMG I will order the study and he will be placed on a cancellation list

## 2021-01-14 NOTE — LETTER
January 14, 2021     Patient: Berna Whelan   YOB: 1960       To Whom It May Concern:    Nora Nolasco was last seen in office 11/2/2020  He is cleared from a neurological perspective for his colonoscopy scheduled for 1/15/2021  Please keep in mind that the patient does need to take his trileptal as his home schedule warrants  If any variance in medication, he is at risk for an event  Please feel free to call our office with further questions or concerns       Sincerely,    Vinetta Ask, DO

## 2021-01-26 ENCOUNTER — HOSPITAL ENCOUNTER (OUTPATIENT)
Dept: NEUROLOGY | Facility: CLINIC | Age: 61
Discharge: HOME | End: 2021-01-26
Payer: COMMERCIAL

## 2021-01-26 ENCOUNTER — DOCUMENTATION (OUTPATIENT)
Dept: NEUROLOGY | Facility: CLINIC | Age: 61
End: 2021-01-26

## 2021-01-26 DIAGNOSIS — G56.03 CARPAL TUNNEL SYNDROME, BILATERAL: ICD-10-CM

## 2021-01-26 PROCEDURE — 95912 NRV CNDJ TEST 11-12 STUDIES: CPT | Performed by: PSYCHIATRY & NEUROLOGY

## 2021-01-26 PROCEDURE — 95886 MUSC TEST DONE W/N TEST COMP: CPT | Performed by: PSYCHIATRY & NEUROLOGY

## 2021-01-26 NOTE — PROGRESS NOTES
This is a 59-year-old patient with a history of epilepsy who her presents for an EMG study  He does report several month history of numbness and tingling in the hands  This occurs at night  and can awaken him at night  it involves a predominantly the 1st three digits of his hands and is worse on the right than the left  He has been utilizing a wrist splint  More recently  he did try a splint in the right elbow but was unsuccessful  Other complaints include nightmares  They do  occur intermittently  He is currently on duloxetine and Trileptal     todays EMG study does demonstrate     1  Bilateral median nerve entrapment neuropathy at the wrist this consistent with carpal tunnel syndrome but is of a severe  Degree  2  There is evidence of a mild right ulnar  nerve entrapment neuropathy across the elbow with demyelination this is consistent with cubital tunnel syndrome  3  There is no evidence of an entrapment neuropathy of the ulnar nerve on the left  4   there is no evidence of a cervical radiculopathy  I asked him to continue utilizing wrist splints  He does have an appointment with Dr Brigida Adkins  We did discuss that since the nightmares are intermittent and he is doing well on the current regimen of duloxetine and Trileptal we will be continued on the current regimen    He Does have an appointment with me in several months and we can discuss things more in detail     I discussed the results of the EMG with the patient at today's  Visit

## 2021-02-14 DIAGNOSIS — R68.89 SPELLS OF DECREASED ATTENTIVENESS: ICD-10-CM

## 2021-02-14 DIAGNOSIS — R42 DIZZINESS: ICD-10-CM

## 2021-02-14 DIAGNOSIS — F41.9 ANXIETY: ICD-10-CM

## 2021-02-14 DIAGNOSIS — R25.1 TREMOR: ICD-10-CM

## 2021-02-14 DIAGNOSIS — R43.0 ANOSMIA: ICD-10-CM

## 2021-02-14 DIAGNOSIS — G40.209 EPILEPSY WITH PARTIAL COMPLEX SEIZURES, WITHOUT STATUS EPILEPTICUS (HCC): ICD-10-CM

## 2021-02-15 RX ORDER — DULOXETIN HYDROCHLORIDE 60 MG/1
60 CAPSULE, DELAYED RELEASE ORAL DAILY
Qty: 90 CAPSULE | Refills: 1 | Status: SHIPPED | OUTPATIENT
Start: 2021-02-15 | End: 2021-08-16 | Stop reason: SDUPTHER

## 2021-02-15 RX ORDER — OXCARBAZEPINE 300 MG/1
TABLET, FILM COATED ORAL
Qty: 450 TABLET | Refills: 1 | Status: SHIPPED | OUTPATIENT
Start: 2021-02-15 | End: 2021-04-26 | Stop reason: SDUPTHER

## 2021-02-24 ENCOUNTER — OFFICE VISIT (OUTPATIENT)
Dept: OBGYN CLINIC | Facility: MEDICAL CENTER | Age: 61
End: 2021-02-24
Payer: COMMERCIAL

## 2021-02-24 VITALS
SYSTOLIC BLOOD PRESSURE: 126 MMHG | TEMPERATURE: 99 F | BODY MASS INDEX: 29.55 KG/M2 | HEART RATE: 69 BPM | HEIGHT: 68 IN | DIASTOLIC BLOOD PRESSURE: 70 MMHG | WEIGHT: 195 LBS

## 2021-02-24 DIAGNOSIS — G56.03 BILATERAL CARPAL TUNNEL SYNDROME: Primary | ICD-10-CM

## 2021-02-24 DIAGNOSIS — G56.23 CUBITAL TUNNEL SYNDROME, BILATERAL: ICD-10-CM

## 2021-02-24 PROCEDURE — 1036F TOBACCO NON-USER: CPT | Performed by: ORTHOPAEDIC SURGERY

## 2021-02-24 PROCEDURE — 99214 OFFICE O/P EST MOD 30 MIN: CPT | Performed by: ORTHOPAEDIC SURGERY

## 2021-02-24 RX ORDER — LIDOCAINE HYDROCHLORIDE AND EPINEPHRINE 10; 10 MG/ML; UG/ML
30 INJECTION, SOLUTION INFILTRATION; PERINEURAL ONCE
Status: CANCELLED | OUTPATIENT
Start: 2021-02-24 | End: 2021-02-24

## 2021-02-24 NOTE — PROGRESS NOTES
Chief Complaint     Bilateral hand numbness, tingling, pain      History of Present Illness     Sourav Yañez is a 63 y o   male who presents to the office today with   Continue numbness and tingling to the bilateral hands  Patient stated that he has felt the same symptoms since his last visit  He stated that he has continued to wear the bilateral cock up splints at night with moderate relief  He stated that he tried the towel splinting but was too uncomfortable so he stopped  He stated that he still wakes up at night with numbness and tingling  He stated when he holds the remote his right hand goes numb  He stated that he feels numbness and tingling while typing on the computer  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  Past Medical History:   Diagnosis Date    Anxiety     Seizure disorder [facility])        Past Surgical History:   Procedure Laterality Date    APPENDECTOMY      CATARACT EXTRACTION Bilateral     COLONOSCOPY  2010    normal    EGD  08/2019    H  pylori gastritis, treated with ampicillin, clarithromycin and omeprazole    FOOT SURGERY Right     WRIST SURGERY Left        No Known Allergies    Current Outpatient Medications on File Prior to Visit   Medication Sig Dispense Refill    DULoxetine (CYMBALTA) 60 mg delayed release capsule Take 1 capsule (60 mg total) by mouth daily 90 capsule 1    LORazepam (ATIVAN) 0 5 mg tablet Take 1 tablet (0 5 mg total) by mouth every 8 (eight) hours as needed for seizures (Patient not taking: Reported on 7/24/2020) 10 tablet 0    OXcarbazepine (TRILEPTAL) 300 mg tablet TAKE 2 TABLETS BY MOUTH IN THE MORNING AND 3 TABLETS AT NIGHT 450 tablet 1    Probiotic Product ([employer] PROBIOTIC MAXIMUM STRENGTH) CAPS Take 1 capsule by mouth daily      Suprep Bowel Prep Kit 17 5-3 13-1 6 GM/177ML SOLN Take 1 Bottle by mouth once for 1 dose 1 Bottle 0     No current facility-administered medications on file prior to visit  Social History     Tobacco Use    Smoking status: Former Smoker     Types: Cigarettes     Quit date: 2019     Years since quittin 5    Smokeless tobacco: Never Used   Substance Use Topics    Alcohol use: Not Currently     Comment: occasional    Drug use: No       Family History   Problem Relation Age of Onset    No Known Problems Mother     No Known Problems Father     Diabetes Maternal Aunt     Breast cancer Sister     Colon cancer Neg Hx        Review of Systems     As stated in the HPI  All other systems were reviewed and are negative  Physical Exam     /70   Pulse 69   Temp 99 °F (37 2 °C)   Ht 5' 8" (1 727 m)   Wt 88 5 kg (195 lb)   BMI 29 65 kg/m²     GENERAL: This is a well-developed, well-nourished, age-appropriate patient in no acute distress  The patient is alert and oriented x3  Pleasant and cooperative  Eyes: Anicteric sclerae  Extraocular movements appear intact  HENT: Nares are patent with no drainage  Lungs: There is equal chest rise on inspection  Breathing is non-labored with no audible wheezing  Cardiovascular: No cyanosis  No upper extremity lymphadema  Skin: Skin is warm to touch  No obvious skin lesions or rashes other than described below  Neurologic: No ataxia  Psychiatric: Mood and affect are appropriate  Bilateral Upper Extremity:   Skin intact   No obvious swelling   No erythema or ecchymosis   Full elbow ROM   Full wrist ROM   DPC 0 bilaterally   + Tinel's at the carpal tunnel bilaterally  + Tinel's at cubital bilaterally   - Durkan's compression test bilaterally  But notes he is  Numb at all times on the right  + flexion compression test bilaterally   Bilateral ulnar nerve mobile does not frankly dislocate   2 point discrimination: 7/7/5/5/7 (left) ; 5/5/5/5/5 (right)  5/5 Motor to the APB, FDI, FDP2, FDP5, EDC  Sensation intact to light touch in the radial, and ulnar nerve distribution   Decreased median nerve distribution on the right; not the left  Brisk capillary refill   Palpable distal radial pulse     Data Review     Results Reviewed     None             Imaging:  None today     Assessment and Plan      Diagnoses and all orders for this visit:    Bilateral carpal tunnel syndrome  -     Case request operating room: bilateral carpal tunnel release; Standing  -     lidocaine-epinephrine (XYLOCAINE/EPINEPHRINE) 1 %-1:100,000 injection 30 mL  -     Case request operating room: bilateral carpal tunnel release    Cubital tunnel syndrome, bilateral           63 y o  male with bilateral carpal and cubital tunnel syndrome  I discussed with the patient that he would be a good candidate for carpal and cubital tunnel releases based on his attempts at nonoperative management but having symptoms refractory to this treatment regimen  I discussed the risks and benefits of surgery at length with the patient  We discussed that the   Electromyography did show a bit of denervation to the  Bilateral APB and that carpal tunnel release would be something I would recommend on a shorter term basis then the cubital tunnel releases  I discussed that we could also perform the carpal and cubital tunnel release on either side at a time which would cut down on the number of surgeries he would potentially need  Patient is concerned about the possibility of nerve transposition and the length of time he might need to be out of work if the transposition is performed  He would like to proceed with carpal tunnel release only and put the cubital tunnel release on hold until the future       he would like to  Have the bilateral carpal tunnel release performed under local anesthesia    Follow Up: after surgery     To Do Next Visit: post-op     PROCEDURES PERFORMED:  Procedures  No Procedures performed today        Scribe Attestation    I,:  Bhanu Arguello am acting as a scribe while in the presence of the attending physician :       I,:  Joleen Chávez MD personally performed the services described in this documentation    as scribed in my presence :

## 2021-02-24 NOTE — H&P (VIEW-ONLY)
Chief Complaint     Bilateral hand numbness, tingling, pain      History of Present Illness     Aiden Metcalf is a 63 y o   male who presents to the office today with   Continue numbness and tingling to the bilateral hands  Patient stated that he has felt the same symptoms since his last visit  He stated that he has continued to wear the bilateral cock up splints at night with moderate relief  He stated that he tried the towel splinting but was too uncomfortable so he stopped  He stated that he still wakes up at night with numbness and tingling  He stated when he holds the remote his right hand goes numb  He stated that he feels numbness and tingling while typing on the computer  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  Past Medical History:   Diagnosis Date    Anxiety     Seizure disorder [facility])        Past Surgical History:   Procedure Laterality Date    APPENDECTOMY      CATARACT EXTRACTION Bilateral     COLONOSCOPY  2010    normal    EGD  08/2019    H  pylori gastritis, treated with ampicillin, clarithromycin and omeprazole    FOOT SURGERY Right     WRIST SURGERY Left        No Known Allergies    Current Outpatient Medications on File Prior to Visit   Medication Sig Dispense Refill    DULoxetine (CYMBALTA) 60 mg delayed release capsule Take 1 capsule (60 mg total) by mouth daily 90 capsule 1    LORazepam (ATIVAN) 0 5 mg tablet Take 1 tablet (0 5 mg total) by mouth every 8 (eight) hours as needed for seizures (Patient not taking: Reported on 7/24/2020) 10 tablet 0    OXcarbazepine (TRILEPTAL) 300 mg tablet TAKE 2 TABLETS BY MOUTH IN THE MORNING AND 3 TABLETS AT NIGHT 450 tablet 1    Probiotic Product ([employer]S PROBIOTIC MAXIMUM STRENGTH) CAPS Take 1 capsule by mouth daily      Suprep Bowel Prep Kit 17 5-3 13-1 6 GM/177ML SOLN Take 1 Bottle by mouth once for 1 dose 1 Bottle 0     No current facility-administered medications on file prior to visit  Social History     Tobacco Use    Smoking status: Former Smoker     Types: Cigarettes     Quit date: 2019     Years since quittin 5    Smokeless tobacco: Never Used   Substance Use Topics    Alcohol use: Not Currently     Comment: occasional    Drug use: No       Family History   Problem Relation Age of Onset    No Known Problems Mother     No Known Problems Father     Diabetes Maternal Aunt     Breast cancer Sister     Colon cancer Neg Hx        Review of Systems     As stated in the HPI  All other systems were reviewed and are negative  Physical Exam     /70   Pulse 69   Temp 99 °F (37 2 °C)   Ht 5' 8" (1 727 m)   Wt 88 5 kg (195 lb)   BMI 29 65 kg/m²     GENERAL: This is a well-developed, well-nourished, age-appropriate patient in no acute distress  The patient is alert and oriented x3  Pleasant and cooperative  Eyes: Anicteric sclerae  Extraocular movements appear intact  HENT: Nares are patent with no drainage  Lungs: There is equal chest rise on inspection  Breathing is non-labored with no audible wheezing  Cardiovascular: No cyanosis  No upper extremity lymphadema  Skin: Skin is warm to touch  No obvious skin lesions or rashes other than described below  Neurologic: No ataxia  Psychiatric: Mood and affect are appropriate  Bilateral Upper Extremity:   Skin intact   No obvious swelling   No erythema or ecchymosis   Full elbow ROM   Full wrist ROM   DPC 0 bilaterally   + Tinel's at the carpal tunnel bilaterally  + Tinel's at cubital bilaterally   - Durkan's compression test bilaterally  But notes he is  Numb at all times on the right  + flexion compression test bilaterally   Bilateral ulnar nerve mobile does not frankly dislocate   2 point discrimination: 7/7/5/5/7 (left) ; 5/5/5/5/5 (right)  5/5 Motor to the APB, FDI, FDP2, FDP5, EDC  Sensation intact to light touch in the radial, and ulnar nerve distribution   Decreased median nerve distribution on the right; not the left  Brisk capillary refill   Palpable distal radial pulse     Data Review     Results Reviewed     None             Imaging:  None today     Assessment and Plan      Diagnoses and all orders for this visit:    Bilateral carpal tunnel syndrome  -     Case request operating room: bilateral carpal tunnel release; Standing  -     lidocaine-epinephrine (XYLOCAINE/EPINEPHRINE) 1 %-1:100,000 injection 30 mL  -     Case request operating room: bilateral carpal tunnel release    Cubital tunnel syndrome, bilateral           63 y o  male with bilateral carpal and cubital tunnel syndrome  I discussed with the patient that he would be a good candidate for carpal and cubital tunnel releases based on his attempts at nonoperative management but having symptoms refractory to this treatment regimen  I discussed the risks and benefits of surgery at length with the patient  We discussed that the   Electromyography did show a bit of denervation to the  Bilateral APB and that carpal tunnel release would be something I would recommend on a shorter term basis then the cubital tunnel releases  I discussed that we could also perform the carpal and cubital tunnel release on either side at a time which would cut down on the number of surgeries he would potentially need  Patient is concerned about the possibility of nerve transposition and the length of time he might need to be out of work if the transposition is performed  He would like to proceed with carpal tunnel release only and put the cubital tunnel release on hold until the future       he would like to  Have the bilateral carpal tunnel release performed under local anesthesia    Follow Up: after surgery     To Do Next Visit: post-op     PROCEDURES PERFORMED:  Procedures  No Procedures performed today        Scribe Attestation    I,:  Beatrice Sepulveda am acting as a scribe while in the presence of the attending physician :       I,:  Javier Limon MD personally performed the services described in this documentation    as scribed in my presence :

## 2021-03-01 ENCOUNTER — TELEPHONE (OUTPATIENT)
Dept: OBGYN CLINIC | Facility: MEDICAL CENTER | Age: 61
End: 2021-03-01

## 2021-03-01 NOTE — TELEPHONE ENCOUNTER
Patient has questions regarding out of work time  Will return call to either wife Parrish Zabala at [phone] or patient work cell [phone]

## 2021-03-10 DIAGNOSIS — Z23 ENCOUNTER FOR IMMUNIZATION: ICD-10-CM

## 2021-03-15 RX ORDER — OMEGA-3 FATTY ACIDS/FISH OIL 300-1000MG
CAPSULE ORAL AS NEEDED
COMMUNITY

## 2021-03-15 NOTE — PRE-PROCEDURE INSTRUCTIONS
Pre-Surgery Instructions:   Medication Instructions    DULoxetine (CYMBALTA) 60 mg delayed release capsule Instructed patient per Anesthesia Guidelines   Ibuprofen (Advil) 200 MG CAPS Instructed patient per Anesthesia Guidelines   OXcarbazepine (TRILEPTAL) 300 mg tablet Instructed patient per Anesthesia Guidelines  Patient   instructed to take*duloxetine and oxcarbazepine with water the morning of surgery  Patient instructed on use of chlorhexidine soap per hospital protocol  Jasper Meléndez

## 2021-03-19 ENCOUNTER — HOSPITAL ENCOUNTER (OUTPATIENT)
Facility: HOSPITAL | Age: 61
Setting detail: OUTPATIENT SURGERY
Discharge: HOME | End: 2021-03-19
Attending: ORTHOPAEDIC SURGERY | Admitting: ORTHOPAEDIC SURGERY
Payer: COMMERCIAL

## 2021-03-19 VITALS
HEIGHT: 68 IN | DIASTOLIC BLOOD PRESSURE: 64 MMHG | SYSTOLIC BLOOD PRESSURE: 134 MMHG | BODY MASS INDEX: 29.55 KG/M2 | HEART RATE: 67 BPM | OXYGEN SATURATION: 97 % | RESPIRATION RATE: 16 BRPM | TEMPERATURE: 97.2 F | WEIGHT: 195 LBS

## 2021-03-19 DIAGNOSIS — G56.03 BILATERAL CARPAL TUNNEL SYNDROME: ICD-10-CM

## 2021-03-19 PROCEDURE — 64721 CARPAL TUNNEL SURGERY: CPT | Performed by: ORTHOPAEDIC SURGERY

## 2021-03-19 PROCEDURE — 64721 CARPAL TUNNEL SURGERY: CPT | Performed by: PHYSICIAN ASSISTANT

## 2021-03-19 RX ORDER — OXYCODONE HYDROCHLORIDE 5 MG/1
5 TABLET ORAL EVERY 6 HOURS PRN
Qty: 5 TABLET | Refills: 0 | Status: SHIPPED | OUTPATIENT
Start: 2021-03-19

## 2021-03-19 RX ORDER — LIDOCAINE HYDROCHLORIDE AND EPINEPHRINE 10; 10 MG/ML; UG/ML
30 INJECTION, SOLUTION INFILTRATION; PERINEURAL ONCE
Status: ACTIVE | OUTPATIENT
Start: 2021-03-19

## 2021-03-19 RX ORDER — MAGNESIUM HYDROXIDE 1200 MG/15ML
LIQUID ORAL AS NEEDED
Status: DISCONTINUED | OUTPATIENT
Start: 2021-03-19 | End: 2021-03-19 | Stop reason: HOSPADM

## 2021-03-19 NOTE — OP NOTE
DATE OF SURGERY: 3/19/2021    SURGEON: Wilber Mohr MD    PREOPERATIVE DIAGNOSIS:  Bilateral carpal tunnel syndrome    POSTOPERATIVE DIAGNOSIS:  Same    PROCEDURE:  Bilateral mini open carpal tunnel release    IMPLANTS: * No implants in log *     ASSISTANTS:   Joshua Kudo, PA  Vanita Royalty, OTC    ANESTHESIA: Local    ESTIMATED BLOOD LOSS: Minimal    INTRAVENOUS FLUIDS: None    URINE OUTPUT: None    TOURNIQUET TIME: None    COMPLICATIONS:  None    ANTIBIOTICS: None    SPECIMENS: * No specimens in log *         INDICATIONS: Florin Palomino is a 63y o  year old male who sustained the above conditions  The indications for operative intervention were carpal tunnel syndrome refractory to nonoperative modalities  The alternatives to operative intervention included continue nonoperative care  The risks of the operative procedure were discussed in detail with the patient including but not limited to the risks of anesthesia, infection, injury to blood vessel/nerve, pain, stiffness, changes in sensation, and the need for repeat surgery  The patient understood these risks and alternatives and elected to proceed with surgery  DESCRIPTION OF PROCEDURE: The patient was seen in the preoperative holding area and identification was performed as per the institution's protocol  1% lidocaine with epinephrine was infused into the operative site  The patient was then brought to the operating room and a briefing was held  A tourniquet was not used  The site was pre-scrubbed with chlorhexidine and prepped with chlorohexidine and draped in the usual sterile fashion  Following a timeout procedure, an incision starting distally at the intersection of Brantley's cardinal line at the intersection of the longitudinal line drawn on the radial border of the 4th ray was made on the right hand  This was carried about 1 in proximally    Dissection was carried down through skin and subcutaneous tissue sweeping the fat away from the palmar fascia  The palmar fascia was incised longitudinally and retractors were placed to identify the transverse carpal ligament  Gentle sweeping of any muscle present revealed the transverse carpal ligament  This was incised using a knife from the proximal portion of the incision distally until 2 mm past the distal palmar fat  Distal release was confirmed and then retractors were placed proximally  A small subcutaneous pocket was then made superficial to the remaining transverse carpal ligament  Sled and knife were then used to then incise the transverse carpal ligament proximally into the antebrachial fascia  Care was taken to preserve the contents of the carpal tunnel which were deep  Full release was confirmed both visually and by feel  And identical procedure was then performed on the left side  The wounds were copiously irrigated and closed in layers including 4-0 nylon  Sterile dressing was applied    All sharps and sponge counts were correct and there were no complications  I attest that I, Delfina Morrow, was present and scrubbed for the entirety of the procedure  No qualified resident was available to assist with this case  Physician assistant was critical in this case for retraction of structures and to protect the median nerve during the case  The patient was awoken from anesthesia in good condition and taken to the PACU         PLAN: The patient will follow up in 10 14 days

## 2021-03-19 NOTE — DISCHARGE INSTRUCTIONS
[facility] - Dr Thomas Rueda (Orthopedic Surgery)    Follow-up Appointments   Please call to set up/confirm your first postoperative visit with Dr Matty Townsend in 10-14 days      Dressing and [address] A dressing has been placed on your hand/arm to keep the incisions clean  Keep your dressing clean and dry      o You may remove the dressing 5 days after surgery  Once the dressing is off, your incision can get wet while showering/bathing only  Do not soak the wound (in bath water, pool, lake, etc )  Otherwise, keep your incision covered with Band-Aids or light dressing  Keep it dry and do not apply any ointments   Wear a plastic bag over your dressing/splint whenever you take a shower or bath until you are allowed to remove it   Swelling is normal after surgery  Elevate your hand/arm so the surgical site is above your heart to decrease the swelling  Swelling is like water, it runs downhill  This is especially important for the first 72 hours after surgery  o The best way to elevate your hand/arm is with your fingers pointing towards the ceiling and your hand/arm above the level of the heart   o You can use pillows to help prop your hand/arm up when sitting or lying down   If you are experiencing pain, be sure you are elevating your hand/arm as often as possible   Apply an ice pack over your dressing/splint for 20 minutes of every hour for the first 3 days when you are awake  This can help to reduce swelling and inflammation  Be sure the ice pack is waterproof so it does not leak on the dressing/splint  A simple ice pack can be made by adding ten cubes and a small amount of water in a small zip-lock bag  Seal this small bag tightly  Place this small bag in a larger zip lock bag  Apply to the area in pain   If the dressing feels too tight in spite of elevation, loosen the outer wrap but do not remove the entire dressing     If you have exposed pins/wires, take clean gauze or a cotton tip applicator (like a Q-tip), get it wet in clean warm water mixed with non-scented hand soap (like Jennifer, [place], Dial, etc ), and gently wipe around the base of the pin where it comes through the skin once a day  Do not use water from a well to clean as this has bacteria in it and can contribute to infections  ACTIVITIES:  [facility] and straighten the parts of your hand, wrist, elbow, and shoulder that are not included in your surgical dressing or splint  Do this at least 6 times a day, as this will help decrease swelling and speed up your recovery  This includes your fingers  See the instructions listed below for finger motion  THIS IS VERY IMPORTANT FOR YOUR RECOVERY! POSTOPERATIVE CARE/CONCERNS:  Judithe Spruce You may experience some temporary numbness in your fingers   You should have very little to no bleeding on your dressing   Notify the office (see contact info at bottom of page) for any of the following:  o Excessive pain not relieved by rest, elevation, and pain medications  o Feeling that the dressing is too tight in spite of adequately elevating hand/arm  o Active bleeding through the dressing  o Drainage from the wound site or pin sites  o Foul odor from the dressing/wound  o Temperature greater that 101? F or chills  o Blue or excessively cold fingertips  o Numbness of the fingertips that does not improve in spite of adequately elevating hand/arm    PAIN MEDICATION:   Pain is a normal part of the recovery after surgery  The pain medication provided to you will help to decrease the discomfort but will not completely eliminate the pain   A prescription for a narcotic pain medicine (oxycodone or hydrocodone) and anti-inflammatory (naproxen) were called in to your pharmacy  Please take the anti-inflammatory medication AND over-the-counter acetaminophen (Tylenol) regularly  The instructions will be listed on the bottles   The narcotic pain medicine should be used for pain that is not controlled by these other medications and only for the first few days after surgery  The narcotic is HIGHLY ADDICTIVE and has many side effects such as causing constipation, dizziness, confusion, decreased breathing and more  It is safe to take for a short period of time after surgery  It is almost never prescribed for longer than a few weeks and never after one month for elective surgeries   Do not take narcotic or anti-inflammatories on an empty stomach   It is illegal to drive while taking narcotic pain medication   Your pain should decrease over the first few days after surgery which will allow you to take less pain medicine, increase the time between doses of medication, or stop taking all pain medicine        OFFICE CONTACT NUMBERS   Please call [phone] with any questions about appointments or any medical concerns

## 2021-03-31 ENCOUNTER — OFFICE VISIT (OUTPATIENT)
Dept: OBGYN CLINIC | Facility: MEDICAL CENTER | Age: 61
End: 2021-03-31

## 2021-03-31 VITALS
WEIGHT: 195 LBS | SYSTOLIC BLOOD PRESSURE: 143 MMHG | TEMPERATURE: 99 F | HEART RATE: 79 BPM | BODY MASS INDEX: 29.55 KG/M2 | DIASTOLIC BLOOD PRESSURE: 82 MMHG | HEIGHT: 68 IN

## 2021-03-31 DIAGNOSIS — G56.03 BILATERAL CARPAL TUNNEL SYNDROME: Primary | ICD-10-CM

## 2021-03-31 PROCEDURE — 3008F BODY MASS INDEX DOCD: CPT | Performed by: ORTHOPAEDIC SURGERY

## 2021-03-31 PROCEDURE — 99024 POSTOP FOLLOW-UP VISIT: CPT | Performed by: ORTHOPAEDIC SURGERY

## 2021-03-31 NOTE — PROGRESS NOTES
History of the Present Illness     Gris Ariza is a 63 y o  male   Who presents the office today for follow-up evaluation status post bilateral carpal tunnel release on 03/19/2021  Patient states he is overall doing well today  He denies any signs of infection  He states the numbness and tingling has resolved at night but he continues to experience some numbness to the radial 3 digits  However he reports this has significantly improved since surgery  He denies any new injuries  He is overall happy with the results of surgery  Physical Exam     /82   Pulse 79   Temp 99 °F (37 2 °C)   Ht 5' 8" (1 727 m)   Wt 88 5 kg (195 lb)   BMI 29 65 kg/m²       Bilateral wrist   Incisions are clean, dry, and intact  No erythema or ecchymosis noted  No drainage noted  DPC 0   Full wrist motion noted   Nontender to palpation   5/5 Motor to the APB, FDI, FDP2, FDP5, EDC  Sensation intact to light touch in the median, radial, and ulnar nerve distribution  Palmar cutaneous nerve intact  Brisk capillary refill noted to all digits      Data Review       Imaging:  None today    Assessment and Plan      39-year-old male status post above surgeries  Patient is doing well postoperatively  We discussed he may continue activities as tolerated with his bilateral hands  He should initiate scar massage on the incision sites  Discussed that he may return to work as of Monday 04/01/2021  Note was provided to him  I will see him back on an as needed basis  Follow Up:  P r n      To Do Next Visit:     PROCEDURES PERFORMED:  Procedures  No Procedures performed today     Scribe Attestation    I,:  Nakita Dodd MA am acting as a scribe while in the presence of the attending physician :       I,:  Thao Kamara MD personally performed the services described in this documentation    as scribed in my presence :

## 2021-03-31 NOTE — LETTER
March 31, 2021     Patient: Vidal Katz   YOB: 1960   Date of Visit: 3/31/2021       To Whom it May Concern:    Mireya Hogue is under my professional care  He was seen in my office on 3/31/2021  He may return to work as of 4/5/21  If you have any questions or concerns, please don't hesitate to call           Sincerely,          Yvonne Pandya MD        CC: No Recipients

## 2021-04-02 ENCOUNTER — TELEPHONE (OUTPATIENT)
Dept: OBGYN CLINIC | Facility: HOSPITAL | Age: 61
End: 2021-04-02

## 2021-04-02 NOTE — TELEPHONE ENCOUNTER
PO Dr Ankita Liu  Re: work letter     Patient would like to know if Dr Ankita Liu can edit his current letter and state "patient can return to work April 12"

## 2021-04-02 NOTE — TELEPHONE ENCOUNTER
Patient called back stating he will be returning to work on 4/12/2021, so he would need the note updated to reflect this  Please update his chart to reflect new note

## 2021-04-02 NOTE — TELEPHONE ENCOUNTER
Called & LVM to let patient know that work note has been updated  I asked him to call back & let us know how he would like to receive it either mail to home address, fax or

## 2021-04-26 ENCOUNTER — OFFICE VISIT (OUTPATIENT)
Dept: NEUROLOGY | Facility: CLINIC | Age: 61
End: 2021-04-26
Payer: COMMERCIAL

## 2021-04-26 VITALS
DIASTOLIC BLOOD PRESSURE: 78 MMHG | BODY MASS INDEX: 30.11 KG/M2 | HEART RATE: 57 BPM | HEIGHT: 68 IN | SYSTOLIC BLOOD PRESSURE: 138 MMHG | WEIGHT: 198.7 LBS

## 2021-04-26 DIAGNOSIS — R68.89 SPELLS OF DECREASED ATTENTIVENESS: ICD-10-CM

## 2021-04-26 DIAGNOSIS — G56.03 CARPAL TUNNEL SYNDROME, BILATERAL: ICD-10-CM

## 2021-04-26 DIAGNOSIS — G40.209 NONINTRACTABLE EPILEPSY WITH COMPLEX PARTIAL SEIZURES (HCC): Primary | ICD-10-CM

## 2021-04-26 DIAGNOSIS — G40.209 EPILEPSY WITH PARTIAL COMPLEX SEIZURES, WITHOUT STATUS EPILEPTICUS (HCC): ICD-10-CM

## 2021-04-26 PROCEDURE — 3008F BODY MASS INDEX DOCD: CPT | Performed by: PSYCHIATRY & NEUROLOGY

## 2021-04-26 PROCEDURE — 99214 OFFICE O/P EST MOD 30 MIN: CPT | Performed by: PSYCHIATRY & NEUROLOGY

## 2021-04-26 PROCEDURE — 1036F TOBACCO NON-USER: CPT | Performed by: PSYCHIATRY & NEUROLOGY

## 2021-04-26 RX ORDER — OXCARBAZEPINE 300 MG/1
TABLET, FILM COATED ORAL
Qty: 540 TABLET | Refills: 1 | Status: SHIPPED | OUTPATIENT
Start: 2021-04-26 | End: 2021-08-16 | Stop reason: SDUPTHER

## 2021-04-26 NOTE — PROGRESS NOTES
Patient ID: Mirian Garcia is a 63 y o  male  Assessment/Plan:    Nonintractable epilepsy with complex partial seizures ([facility] [address] )            Daniel Earl is a 69-year-old patient with partial complex seizures  He continues to have events every one or two weeks characterized by staring spells and a aura  Subsequently I have increased her Trileptal to 900 mg twice a day  I did inform them to look the manufacture of the pills as this can affect the dose  He is to have laboratory studies in approximately one or two months but they need to be trough before his a m  dose of Trileptal       He can continue on the same dose of duloxetine  He continues to have nightmares in this may be from the medications or other etiologies he does complain of snoring  We did discuss obtaining a sleep evaluation but he was quite reluctant  If the symptoms persist despite the higher dose of Trileptal we may need to refer him to a sleep physician  He will call us and let us know accordingly  He is to return to our offices in four months         Carpal tunnel syndrome, bilateral    Daniel Earl is a 69-year-old patient who is status post   Bilateralcarpal tunnel surgery  Diagnoses and all orders for this visit:    Nonintractable epilepsy with complex partial seizures ([facility] [address] )    Spells of decreased attentiveness  -     OXcarbazepine (TRILEPTAL) 300 mg tablet; TAKE 3 TABLETS BY MOUTH IN THE MORNING AND 3 TABLETS AT NIGHT 90 day supply    Epilepsy with partial complex seizures, without status epilepticus (HCC)  -     CBC and Platelet; Future  -     Comprehensive metabolic panel; Future  -     Oxcarbazepine level; Future  -     OXcarbazepine (TRILEPTAL) 300 mg tablet; TAKE 3 TABLETS BY MOUTH IN THE MORNING AND 3 TABLETS AT NIGHT 90 day supply    Carpal tunnel syndrome, bilateral         Subjective:    this is a 69-year-old patient who presents in a follow up visit   He is accompanied Wili Velázquez  He  Does report continue spells    They are characterized by sense warmth which goes up to stomach with disorientation and losing feeling in his hands and feet  They are associated with a staring spell  They do occur now every one or 2 times per week but without a loss of consciousness  He is currently on Trileptal generic 600 in the morning and 900 at bedtime                           The spells are characterized by sense of the stomach warmth  This then goes up to his stomach  Becomes disoriented he had lost loses feeling in hands and feet  He loses  color in his face and hands This is sometimes a characterized by twitching of the mouth and staring  He will feel tired and fatigued after the episodes  These episodes can last between 1 minutes and 30 minutes         On July 28, 2019 due to the episodes he was seen in the emergency room at [facility]    brain ischemia and/or cardiac etiology was  ruled out  It does limit his ability to drive  He has not been driving  He was then evaluated by his family [de-identified] office in September  At that point he was placed on Cymbalta 30 mg tablets  The dose of Cymbalta was increased to 60 mg a day  It was also felt that he could have a component of partial complex seizures and he did undergo an EEG which was abnormal   It demonstrated bilateral temporal slowing left focal temporal slowing and sharp waves emanating from the left temporal region  Attempts to reduce duloxetine did result in more  irritability spells           Utilizes Trileptal generic 600 in am  At 5;15 am  and 900 mg at 5pm  with 60 mg of duloxetine daily,        overall his spirits have improved  He is now back to full-time  He works [employer]     He did have carpal tunnel release performed approximately one month ago and he is doing well           He continues to be active and walks approximately three miles a day             a repeat MRI of the brain showed no abnormalities in the temporal regvions with cuts        Mri of brain 12/23/19 No acute intracranial abnormality or pathologic intracranial enhancement      Normal hippocampal and parahippocampal gyri  No evidence of mesial temporal sclerosis      Nonspecific subcortical frontal white matter signal abnormality  These findings can be seen in patients with migraines and/or chronic microangiopathic disease         On no recent laboratory studies     he also complains of restlessness in his legs, and snoring                           The following portions of the patient's history were reviewed and updated as appropriate:   He  has a past medical history of Anxiety, Arthritis, Carpal tunnel syndrome, bilateral, Epilepsy ([address] ), Exercises daily, Seizure disorder ([address] ), and Wrist pain  He  has a past surgical history that includes Cataract extraction (Bilateral); Wrist surgery (Left); Colonoscopy (2010); EGD (08/2019); Colonoscopy (02/2021); Appendectomy; Foot surgery (Right); and pr revise median n/carpal tunnel surg (Bilateral, 3/19/2021)  His family history includes Breast cancer in his sister; Diabetes in his maternal aunt; No Known Problems in his father and mother  He  reports that he quit smoking about 20 months ago  His smoking use included cigarettes  He has a 90 00 pack-year smoking history  He has never used smokeless tobacco  He reports previous alcohol use  He reports that he does not use drugs    Current Outpatient Medications   Medication Sig Dispense Refill    DULoxetine (CYMBALTA) 60 mg delayed release capsule Take 1 capsule (60 mg total) by mouth daily 90 capsule 1    Ibuprofen (Advil) 200 MG CAPS Take by mouth as needed      OXcarbazepine (TRILEPTAL) 300 mg tablet TAKE 3 TABLETS BY MOUTH IN THE MORNING AND 3 TABLETS AT NIGHT 90 day supply 540 tablet 1    LORazepam (ATIVAN) 0 5 mg tablet Take 1 tablet (0 5 mg total) by mouth every 8 (eight) hours as needed for seizures (Patient not taking: Reported on 7/24/2020) 10 tablet 0    oxyCODONE (ROXICODONE) 5 mg immediate release tablet Take 1 tablet (5 mg total) by mouth every 6 (six) hours as needed for severe pain for up to 5 dosesMax Daily Amount: 20 mg (Patient not taking: Reported on 4/5/2021) 5 tablet 0     Current Facility-Administered Medications   Medication Dose Route Frequency Provider Last Rate Last Admin    lidocaine-epinephrine (XYLOCAINE/EPINEPHRINE) 1 %-1:100,000 injection 30 mL  30 mL Infiltration Once Windy Zaman MD         He has No Known Allergies            Objective:    Blood pressure 138/78, pulse 57, height 5' 8" (1 727 m), weight 90 1 kg (198 lb 11 2 oz)  Physical Exam  Eyes:      General: Lids are normal       Extraocular Movements: Extraocular movements intact  Pupils: Pupils are equal, round, and reactive to light  Neurological:      Deep Tendon Reflexes: Strength normal       Reflex Scores:       Tricep reflexes are 2+ on the right side and 2+ on the left side  Bicep reflexes are 2+ on the right side and 2+ on the left side  Patellar reflexes are 2+ on the right side and 2+ on the left side  Achilles reflexes are 1+ on the right side and 1+ on the left side  Neurological Exam  Mental Status   Oriented to person, place and time  Language is fluent with no aphasia  Cranial Nerves  CN II: Visual acuity is normal  Visual fields full to confrontation  CN III, IV, VI: Extraocular movements intact bilaterally  Normal lids and orbits bilaterally  Pupils equal round and reactive to light bilaterally  CN V: Facial sensation is normal   CN VII: Full and symmetric facial movement  CN VIII: Hearing is normal   CN IX, X: Palate elevates symmetrically  Normal gag reflex  CN XI: Shoulder shrug strength is normal   CN XII: Tongue midline without atrophy or fasciculations  Motor  Normal muscle bulk throughout  No fasciculations present  Strength is 5/5 throughout all four extremities      Sensory  Light touch is normal in upper and lower extremities  Temperature is normal in upper and lower extremities  Reflexes                                           Right                      Left  Biceps                                 2+                         2+  Triceps                                2+                         2+  Patellar                                2+                         2+  Achilles                                1+                         1+  Plantar                          Downgoing                   Coordination  Right: Finger-to-nose normal   Left: Finger-to-nose normal     Gait Able to rise from chair without using arms  review of systems obtained from the medical assistant as was reviewed with the patient at today's  Visit     ROS:    Review of Systems   Constitutional: Negative  Negative for appetite change and fever  HENT: Positive for hearing loss and tinnitus  Negative for trouble swallowing and voice change  Eyes: Negative  Negative for photophobia and pain  Respiratory: Negative  Negative for shortness of breath  Cardiovascular: Negative  Negative for palpitations  Gastrointestinal: Negative  Negative for nausea and vomiting  Endocrine: Negative  Negative for cold intolerance  Genitourinary: Negative  Negative for dysuria, frequency and urgency  Musculoskeletal: Negative  Negative for myalgias and neck pain  Skin: Negative  Negative for rash  Neurological: Positive for seizures (has moments where he drifts-doesn't happen often )  Negative for dizziness, tremors, syncope, facial asymmetry, speech difficulty, weakness, light-headedness, numbness and headaches  Hematological: Negative  Does not bruise/bleed easily  Psychiatric/Behavioral: Positive for sleep disturbance (has nightmares)  Negative for confusion and hallucinations

## 2021-04-26 NOTE — ASSESSMENT & PLAN NOTE
Dorothy De La Fuente is a 53-year-old patient with partial complex seizures  He continues to have events every one or two weeks characterized by staring spells and a aura  Subsequently I have increased her Trileptal to 900 mg twice a day  I did inform them to look the manufacture of the pills as this can affect the dose  He is to have laboratory studies in approximately one or two months but they need to be trough before his a m  dose of Trileptal       He can continue on the same dose of duloxetine  He continues to have nightmares in this may be from the medications or other etiologies he does complain of snoring  We did discuss obtaining a sleep evaluation but he was quite reluctant  If the symptoms persist despite the higher dose of Trileptal we may need to refer him to a sleep physician  He will call us and let us know accordingly    He is to return to our offices in four months

## 2021-07-09 ENCOUNTER — OFFICE VISIT (OUTPATIENT)
Dept: FAMILY MEDICINE CLINIC | Facility: CLINIC | Age: 61
End: 2021-07-09
Payer: COMMERCIAL

## 2021-07-09 VITALS
SYSTOLIC BLOOD PRESSURE: 120 MMHG | OXYGEN SATURATION: 96 % | TEMPERATURE: 97.6 F | RESPIRATION RATE: 17 BRPM | HEIGHT: 68 IN | HEART RATE: 65 BPM | DIASTOLIC BLOOD PRESSURE: 80 MMHG | WEIGHT: 195.4 LBS | BODY MASS INDEX: 29.61 KG/M2

## 2021-07-09 DIAGNOSIS — Z13.220 SCREENING FOR CHOLESTEROL LEVEL: ICD-10-CM

## 2021-07-09 DIAGNOSIS — Z13.29 SCREENING FOR THYROID DISORDER: ICD-10-CM

## 2021-07-09 DIAGNOSIS — Z00.00 ANNUAL PHYSICAL EXAM: Primary | ICD-10-CM

## 2021-07-09 DIAGNOSIS — R73.09 ELEVATED GLUCOSE LEVEL: ICD-10-CM

## 2021-07-09 PROCEDURE — 3725F SCREEN DEPRESSION PERFORMED: CPT | Performed by: FAMILY MEDICINE

## 2021-07-09 PROCEDURE — 3008F BODY MASS INDEX DOCD: CPT | Performed by: FAMILY MEDICINE

## 2021-07-09 PROCEDURE — 1036F TOBACCO NON-USER: CPT | Performed by: FAMILY MEDICINE

## 2021-07-09 PROCEDURE — 99396 PREV VISIT EST AGE 40-64: CPT | Performed by: FAMILY MEDICINE

## 2021-07-09 NOTE — PROGRESS NOTES
ADULT ANNUAL [address]  GROUP    NAME: Papi Orta  AGE: 63 y o  SEX: male  : 1960     DATE: 2021     Assessment and Plan:     Problem List Items Addressed This Visit     None      Visit Diagnoses     Annual physical exam    -  Primary    BMI 29 0-29 9,adult              Immunizations and preventive care screenings were discussed with patient today  Appropriate education was printed on patient's after visit summary  Counseling:  Alcohol/drug use: discussed moderation in alcohol intake, the recommendations for healthy alcohol use, and avoidance of illicit drug use  Dental Health: discussed importance of regular tooth brushing, flossing, and dental visits  Injury prevention: discussed safety/seat belts, safety helmets, smoke detectors, carbon dioxide detectors, and smoking near bedding or upholstery  Sexual health: discussed sexually transmitted diseases, partner selection, use of condoms, avoidance of unintended pregnancy, and contraceptive alternatives  · Exercise: the importance of regular exercise/physical activity was discussed  Recommend exercise 3-5 times per week for at least 30 minutes  BMI Counseling: Body mass index is 29 71 kg/m²  The BMI is above normal  Nutrition recommendations include decreasing portion sizes, encouraging healthy choices of fruits and vegetables, decreasing fast food intake, consuming healthier snacks and limiting drinks that contain sugar  Exercise recommendations include moderate physical activity 150 minutes/week and exercising 3-5 times per week  No pharmacotherapy was ordered  Patient referred to PCP due to patient being overweight  Depression Screening and Follow-up Plan: Patient's depression screening was positive with a PHQ-2 score of 0  Clincally patient does not have depression  No treatment is required  Return in 1 year (on 2022)       Chief Complaint:     Chief Complaint   Patient presents with    Physical Exam      History of Present Illness:     Adult Annual Physical   Patient here for a comprehensive physical exam  The patient reports no problems  Diet and Physical Activity  · Diet/Nutrition: well balanced diet  · Exercise: 3-4 times a week on average  Depression Screening  PHQ-9 Depression Screening    PHQ-9:   Frequency of the following problems over the past two weeks:      Little interest or pleasure in doing things: 0 - not at all  Feeling down, depressed, or hopeless: 0 - not at all  PHQ-2 Score: 0       General Health  · Sleep: sleeps poorly  Vivid dreams  · Hearing: normal - bilateral   · Vision: no vision problems  · Dental: regular dental visits   Health  · Symptoms include: none     Review of Systems:     Review of Systems   Constitutional: Negative for activity change, chills, fatigue and fever  HENT: Negative for congestion, ear pain, sinus pressure and sore throat  Eyes: Negative for redness, itching and visual disturbance  Respiratory: Negative for cough and shortness of breath  Cardiovascular: Negative for chest pain and palpitations  Gastrointestinal: Negative for abdominal pain, diarrhea and nausea  Endocrine: Negative for cold intolerance and heat intolerance  Genitourinary: Negative for dysuria, flank pain and frequency  Musculoskeletal: Negative for arthralgias, back pain, gait problem and myalgias  Skin: Negative for color change  Allergic/Immunologic: Negative for environmental allergies  Neurological: Negative for dizziness, numbness and headaches  Psychiatric/Behavioral: Negative for behavioral problems and sleep disturbance        Past Medical History:     Past Medical History:   Diagnosis Date    Anxiety     Arthritis     RH    Carpal tunnel syndrome, bilateral     correction both today 3/19/2021    Epilepsy ([facility] [address] )     last seizure approx last March 2020/was off meds for awhile//but resumed taking them and no problems since    Exercises daily     also walks alot at work    Seizure disorder [facility])     Wrist pain     bilat occas      Past Surgical History:     Past Surgical History:   Procedure Laterality Date    APPENDECTOMY      age8    CATARACT EXTRACTION Bilateral     COLONOSCOPY      normal    COLONOSCOPY  2021    EGD  2019    H  pylori gastritis, treated with ampicillin, clarithromycin and omeprazole    FOOT SURGERY Right     pin and 3 screws implanted    MD REVISE MEDIAN N/CARPAL TUNNEL SURG Bilateral 3/19/2021    Procedure: Carpal tunnel release;  Surgeon: Sana Decker MD;  Location: [facility];  Service: Orthopedics    WRIST SURGERY Left       Family History:     Family History   Problem Relation Age of Onset    No Known Problems Mother     No Known Problems Father     Diabetes Maternal Aunt     Breast cancer Sister     Colon cancer Neg Hx       Social History:     Social History     Socioeconomic History    Marital status: /Civil Union     Spouse name: None    Number of children: None    Years of education: None    Highest education level: None   Occupational History    None   Tobacco Use    Smoking status: Former Smoker     Packs/day: 2 00     Years: 45 00     Pack years: 90 00     Types: Cigarettes     Quit date: 2019     Years since quittin 9    Smokeless tobacco: Never Used   Vaping Use    Vaping Use: Never used   Substance and Sexual Activity    Alcohol use: Not Currently     Comment: quit 3/2019    Drug use: No    Sexual activity: None   Other Topics Concern    None   Social History Narrative    None     Social Determinants of Health     Financial Resource Strain:     Difficulty of Paying Living Expenses:    Food Insecurity:     Worried About Running Out of Food in the Last Year:     Ran Out of Food in the Last Year:    Transportation Needs:     Lack of Transportation (Medical):      Lack of Transportation (Non-Medical): Physical Activity:     Days of Exercise per Week:     Minutes of Exercise per Session:    Stress:     Feeling of Stress :    Social Connections:     Frequency of Communication with Friends and Family:     Frequency of Social Gatherings with Friends and Family:     Attends Muslim Services:     Active Member of Clubs or Organizations:     Attends Club or Organization Meetings:     Marital Status:    Intimate Partner Violence:     Fear of Current or Ex-Partner:     Emotionally Abused:     Physically Abused:     Sexually Abused:       Current Medications:     Current Outpatient Medications   Medication Sig Dispense Refill    DULoxetine (CYMBALTA) 60 mg delayed release capsule Take 1 capsule (60 mg total) by mouth daily 90 capsule 1    Ibuprofen (Advil) 200 MG CAPS Take by mouth as needed      OXcarbazepine (TRILEPTAL) 300 mg tablet TAKE 3 TABLETS BY MOUTH IN THE MORNING AND 3 TABLETS AT NIGHT 90 day supply 540 tablet 1    LORazepam (ATIVAN) 0 5 mg tablet Take 1 tablet (0 5 mg total) by mouth every 8 (eight) hours as needed for seizures (Patient not taking: Reported on 7/24/2020) 10 tablet 0    oxyCODONE (ROXICODONE) 5 mg immediate release tablet Take 1 tablet (5 mg total) by mouth every 6 (six) hours as needed for severe pain for up to 5 dosesMax Daily Amount: 20 mg (Patient not taking: Reported on 7/9/2021) 5 tablet 0     Current Facility-Administered Medications   Medication Dose Route Frequency Provider Last Rate Last Admin    lidocaine-epinephrine (XYLOCAINE/EPINEPHRINE) 1 %-1:100,000 injection 30 mL  30 mL Infiltration Once Romeo Olivier MD          Allergies:     No Known Allergies   Physical Exam:     /80 (BP Location: Right arm, Patient Position: Sitting, Cuff Size: Large)   Pulse 65   Temp 97 6 °F (36 4 °C) (Temporal)   Resp 17   Ht 5' 8" (1 727 m)   Wt 88 6 kg (195 lb 6 4 oz)   SpO2 96%   BMI 29 71 kg/m²     Physical Exam  Vitals reviewed     Constitutional:       General: He is not in acute distress  Appearance: He is well-developed  He is not diaphoretic  HENT:      Head: Normocephalic and atraumatic  No right periorbital erythema or left periorbital erythema  Right Ear: Tympanic membrane normal  No decreased hearing noted  Left Ear: Tympanic membrane normal  No decreased hearing noted  Nose: Nose normal       Right Sinus: No maxillary sinus tenderness or frontal sinus tenderness  Left Sinus: No maxillary sinus tenderness or frontal sinus tenderness  Mouth/Throat:      Lips: Pink  Mouth: Mucous membranes are moist       Pharynx: No oropharyngeal exudate  Tonsils: No tonsillar exudate or tonsillar abscesses  Eyes:      General: Lids are normal       Extraocular Movements: Extraocular movements intact  Conjunctiva/sclera: Conjunctivae normal       Pupils: Pupils are equal, round, and reactive to light  Neck:      Thyroid: No thyroid mass  Trachea: Trachea normal    Cardiovascular:      Rate and Rhythm: Normal rate and regular rhythm  Pulses: Normal pulses  Heart sounds: Normal heart sounds  No murmur heard  No friction rub  No gallop  Pulmonary:      Effort: Pulmonary effort is normal  No respiratory distress  Breath sounds: Normal breath sounds  No wheezing or rales  Abdominal:      General: Bowel sounds are normal  There is no distension  Palpations: Abdomen is soft  There is no mass  Tenderness: There is no abdominal tenderness  There is no guarding  Musculoskeletal:         General: Normal range of motion  Cervical back: Normal range of motion and neck supple  Skin:     General: Skin is warm and dry  Coloration: Skin is not pale  Findings: No erythema or rash  Neurological:      Mental Status: He is alert and oriented to person, place, and time  Cranial Nerves: No cranial nerve deficit        Coordination: Coordination normal    Psychiatric:         Attention and Perception: Attention and perception normal          Mood and Affect: Mood and affect normal          Speech: Speech normal          Behavior: Behavior normal          Thought Content:  Thought content normal          Cognition and Memory: Cognition and memory normal          Judgment: Judgment normal           IhDO DEJAN Menendez [address]

## 2021-07-09 NOTE — PATIENT INSTRUCTIONS

## 2021-07-14 ENCOUNTER — TELEPHONE (OUTPATIENT)
Dept: NEUROLOGY | Facility: CLINIC | Age: 61
End: 2021-07-14

## 2021-07-14 NOTE — TELEPHONE ENCOUNTER
JASENI-  Called mobile number, spoke with Prasad Bhatti (on communication consent) regarding lab results as patient was at work  She states they are aware his sugar was high and they are already reaching out to PCP regarding his elevated glucose   She was not able to tell me if patient was fasting for labs or not

## 2021-07-14 NOTE — TELEPHONE ENCOUNTER
----- Message from Satnam Espinoza DO sent at 7/14/2021  9:48 AM EDT -----  Glucose is high at 126   Otherwsie labs  are normal Please find out if labs were fasting    Other wise send to  pcp

## 2021-08-10 ENCOUNTER — TELEPHONE (OUTPATIENT)
Dept: NEUROLOGY | Facility: CLINIC | Age: 61
End: 2021-08-10

## 2021-08-10 NOTE — TELEPHONE ENCOUNTER
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   Diamond Harris

## 2021-08-10 NOTE — TELEPHONE ENCOUNTER
No early morning  appointments are available in the [facility] for the rest of the year     Can offer him August 25th Wednesday at 8:30 a m  at the [facility] office or he can keep his 1:30 on aug 16 appointment at the [facility]

## 2021-08-10 NOTE — TELEPHONE ENCOUNTER
Called patient's wife  She says morning appointments are usually preferred by the patient and provider but if there are no other openings then whatever is available can work  Dr Jamal Hernández- I scheduled patient for next available on 8/16 at 1:30 PM so he does not lose a spot  Do you have any suggestions on AM appointment times as she says this was usually preferred by both you and the patient?  If so please advise and I will reschedule    Patient's wife will confirm 8/16 appointment with patient

## 2021-08-12 NOTE — TELEPHONE ENCOUNTER
I called and left a voicemail message about patients upcoming appointment with Dr Jj Jewell on 8/16/21 @ 1:30 pm   I requested a call back to our office if the patient has any issues or concerns or cannot keep this appointment

## 2021-08-16 ENCOUNTER — OFFICE VISIT (OUTPATIENT)
Dept: NEUROLOGY | Facility: CLINIC | Age: 61
End: 2021-08-16
Payer: COMMERCIAL

## 2021-08-16 VITALS
TEMPERATURE: 97.4 F | HEART RATE: 54 BPM | HEIGHT: 68 IN | WEIGHT: 192.3 LBS | SYSTOLIC BLOOD PRESSURE: 122 MMHG | BODY MASS INDEX: 29.15 KG/M2 | RESPIRATION RATE: 18 BRPM | DIASTOLIC BLOOD PRESSURE: 68 MMHG

## 2021-08-16 DIAGNOSIS — R68.89 SPELLS OF DECREASED ATTENTIVENESS: ICD-10-CM

## 2021-08-16 DIAGNOSIS — F41.9 ANXIETY: ICD-10-CM

## 2021-08-16 DIAGNOSIS — G40.209 EPILEPSY WITH PARTIAL COMPLEX SEIZURES, WITHOUT STATUS EPILEPTICUS (HCC): ICD-10-CM

## 2021-08-16 DIAGNOSIS — G40.209 NONINTRACTABLE EPILEPSY WITH COMPLEX PARTIAL SEIZURES (HCC): ICD-10-CM

## 2021-08-16 DIAGNOSIS — R26.89 BALANCE DISORDER: Primary | ICD-10-CM

## 2021-08-16 DIAGNOSIS — R43.0 ANOSMIA: ICD-10-CM

## 2021-08-16 DIAGNOSIS — R42 DIZZINESS: ICD-10-CM

## 2021-08-16 DIAGNOSIS — R25.1 TREMOR: ICD-10-CM

## 2021-08-16 PROCEDURE — 99214 OFFICE O/P EST MOD 30 MIN: CPT | Performed by: PSYCHIATRY & NEUROLOGY

## 2021-08-16 PROCEDURE — 1036F TOBACCO NON-USER: CPT | Performed by: PSYCHIATRY & NEUROLOGY

## 2021-08-16 PROCEDURE — 3008F BODY MASS INDEX DOCD: CPT | Performed by: PSYCHIATRY & NEUROLOGY

## 2021-08-16 RX ORDER — OXCARBAZEPINE 300 MG/1
TABLET, FILM COATED ORAL
Qty: 540 TABLET | Refills: 1 | Status: SHIPPED | OUTPATIENT
Start: 2021-08-16 | End: 2022-02-14 | Stop reason: SDUPTHER

## 2021-08-16 RX ORDER — DULOXETIN HYDROCHLORIDE 60 MG/1
60 CAPSULE, DELAYED RELEASE ORAL DAILY
Qty: 90 CAPSULE | Refills: 1 | Status: SHIPPED | OUTPATIENT
Start: 2021-08-16 | End: 2022-02-14 | Stop reason: SDUPTHER

## 2021-08-16 NOTE — ASSESSMENT & PLAN NOTE
Ana Stoll is doing well in the current dose of Trileptal 900 mg twice a day      the recent ox carbamazepine level was normal   His sodium was normal   Will continue him on the current dose and prescriptions were sent for three month supply to his pharmacy

## 2021-08-16 NOTE — ASSESSMENT & PLAN NOTE
Olman  complains of some diffculty walking in ambulatory problem  Does have also decrease in vibratory sensation in his feet but he had normal proprioception temperature and reflexes  I have asked him to continue to be active on monitor forms the symptoms    May be related to slightly increase in glucose /They do increase in intensity he may require EMG testing

## 2021-08-16 NOTE — PROGRESS NOTES
Patient ID: Christian Mcmanus is a 57 y o  male  Assessment/Plan:    Nonintractable epilepsy with complex partial seizures ([facility] [address] )  Tami Wilkerson is doing well in the current dose of Trileptal 900 mg twice a day  the recent ox carbamazepine level was normal   His sodium was normal   Will continue him on the current dose and prescriptions were sent for three month supply to his pharmacy     Balance disorder      Olman  complains of some diffculty walking in ambulatory problem  Does have also decrease in vibratory sensation in his feet but he had normal proprioception temperature and reflexes  I have asked him to continue to be active on monitor forms the symptoms  May be related to slightly increase in glucose /They do increase in intensity he may require EMG testing       he is to have laboratory studies performed in several months  He is to return to our offices in 6 to 7 months  Diagnoses and all orders for this visit:    Balance disorder    Spells of decreased attentiveness  -     OXcarbazepine (TRILEPTAL) 300 mg tablet; TAKE 3 TABLETS BY MOUTH IN THE MORNING AND 3 TABLETS AT NIGHT 90 day supply    Epilepsy with partial complex seizures, without status epilepticus (HCC)  -     OXcarbazepine (TRILEPTAL) 300 mg tablet; TAKE 3 TABLETS BY MOUTH IN THE MORNING AND 3 TABLETS AT NIGHT 90 day supply  -     Comprehensive metabolic panel; Future  -     CBC and Platelet; Future  -     Oxcarbazepine level; Future    Dizziness  -     DULoxetine (CYMBALTA) 60 mg delayed release capsule; Take 1 capsule (60 mg total) by mouth daily    Tremor  -     DULoxetine (CYMBALTA) 60 mg delayed release capsule; Take 1 capsule (60 mg total) by mouth daily    Anosmia  -     DULoxetine (CYMBALTA) 60 mg delayed release capsule; Take 1 capsule (60 mg total) by mouth daily    Anxiety  -     DULoxetine (CYMBALTA) 60 mg delayed release capsule;  Take 1 capsule (60 mg total) by mouth daily    Nonintractable epilepsy with complex partial seizures [facility])         Subjective:    this is a 29-year-old patient who presents in a follow up visit   He is accompanied Hector Bryan  He Does report the spells/ auras have improved  In the past they were characterized by   They are characterized by sense warmth which goes up to stomach with disorientation and losing feeling in his hands and feet  They are associated with a staring spell  He is now on 900 mg of Trileptal twice                               On July 28, 2019 due to the episodes he was seen in the emergency room at [facility]    brain ischemia and/or cardiac etiology was  ruled out  It does limit his ability to drive  He has not been driving  He was then evaluated by his family [de-identified] office in September  At that point he was placed on Cymbalta 30 mg tablets  The dose of Cymbalta was increased to 60 mg a day  It was also felt that he could have a component of partial complex seizures and he did undergo an EEG which was abnormal   It demonstrated bilateral temporal slowing left focal temporal slowing and sharp waves emanating from the left temporal region  Attempts to reduce duloxetine did result in more  irritability spells                overall his spirits have improved  He is now back to full-time  He works [employer]  He did have carpal tunnel release bilaterally and he has now recovered  He does complain of some balance problems but he denies any burning dysesthesias numbness or tingling in the feet  He has to travel next week for a education conference              a repeat MRI of the brain showed no abnormalities in the temporal regvions with cuts        Mri of brain 12/23/19 No acute intracranial abnormality or pathologic intracranial enhancement      Normal hippocampal and parahippocampal gyri  No evidence of mesial temporal sclerosis      Nonspecific subcortical frontal white matter signal abnormality    These findings can be seen in patients with migraines and/or chronic microangiopathic disease                         The following portions of the patient's history were reviewed and updated as appropriate:   He  has a past medical history of Anxiety, Arthritis, Carpal tunnel syndrome, bilateral, Epilepsy ([address] ), Exercises daily, Seizure disorder ([address] ), and Wrist pain  He  has a past surgical history that includes Cataract extraction (Bilateral); Wrist surgery (Left); Colonoscopy (2010); EGD (08/2019); Colonoscopy (02/2021); Appendectomy; Foot surgery (Right); and pr revise median n/carpal tunnel surg (Bilateral, 3/19/2021)  His family history includes Breast cancer in his sister; Diabetes in his maternal aunt; No Known Problems in his father and mother  He  reports that he quit smoking about 2 years ago  His smoking use included cigarettes  He has a 90 00 pack-year smoking history  He has never used smokeless tobacco  He reports previous alcohol use  He reports that he does not use drugs    Current Outpatient Medications   Medication Sig Dispense Refill    DULoxetine (CYMBALTA) 60 mg delayed release capsule Take 1 capsule (60 mg total) by mouth daily 90 capsule 1    Ibuprofen (Advil) 200 MG CAPS Take by mouth as needed      OXcarbazepine (TRILEPTAL) 300 mg tablet TAKE 3 TABLETS BY MOUTH IN THE MORNING AND 3 TABLETS AT NIGHT 90 day supply 540 tablet 1    LORazepam (ATIVAN) 0 5 mg tablet Take 1 tablet (0 5 mg total) by mouth every 8 (eight) hours as needed for seizures (Patient not taking: Reported on 7/24/2020) 10 tablet 0    oxyCODONE (ROXICODONE) 5 mg immediate release tablet Take 1 tablet (5 mg total) by mouth every 6 (six) hours as needed for severe pain for up to 5 dosesMax Daily Amount: 20 mg (Patient not taking: Reported on 7/9/2021) 5 tablet 0     Current Facility-Administered Medications   Medication Dose Route Frequency Provider Last Rate Last Admin    lidocaine-epinephrine (XYLOCAINE/EPINEPHRINE) 1 %-1:100,000 injection 30 mL  30 mL Infiltration Once Suman Mercado MD         He has No Known Allergies            Objective:    Blood pressure 122/68, pulse (!) 54, temperature (!) 97 4 °F (36 3 °C), temperature source Tympanic, resp  rate 18, height 5' 8" (1 727 m), weight 87 2 kg (192 lb 4 8 oz)  Physical Exam  Eyes:      General: Lids are normal       Extraocular Movements: Extraocular movements intact  Pupils: Pupils are equal, round, and reactive to light  Neurological:      Deep Tendon Reflexes: Strength normal       Reflex Scores:       Tricep reflexes are 1+ on the right side and 1+ on the left side  Bicep reflexes are 2+ on the right side and 2+ on the left side  Patellar reflexes are 2+ on the right side and 2+ on the left side  Achilles reflexes are 1+ on the right side and 1+ on the left side  Neurological Exam  Mental Status  Awake, alert and oriented to person, place and time  Oriented to person, place and time  Cranial Nerves  CN II: Visual acuity is normal  Visual fields full to confrontation  CN III, IV, VI: Extraocular movements intact bilaterally  Normal lids and orbits bilaterally  Pupils equal round and reactive to light bilaterally  CN V: Facial sensation is normal   CN VII: Full and symmetric facial movement  CN VIII: Hearing is normal   CN IX, X: Palate elevates symmetrically  Normal gag reflex  CN XI: Shoulder shrug strength is normal   CN XII: Tongue midline without atrophy or fasciculations  Motor  Normal muscle bulk throughout  No fasciculations present  Strength is 5/5 throughout all four extremities  Sensory  Light touch is normal in upper and lower extremities  Temperature is normal in upper and lower extremities       Vibratory sensation was absent at the toes were present at the ankles proprioception and pinprick were normal        Reflexes                                           Right                      Left  Biceps                                 2+ 2+  Triceps                                1+                         1+  Patellar                                2+                         2+  Achilles                                1+                         1+  Plantar                           Downgoing                Downgoing    Right pathological reflexes: Di's absent  Left pathological reflexes: Di's absent  Coordination  Right: Finger-to-nose normal   Left: Finger-to-nose normal     Gait  Normal casual, toe, heel and tandem gait  Able to rise from chair without using arms  review of systems obtained from the medical assistant as below was reviewed with the patient at today's visit    ROS:    Review of Systems   Constitutional: Negative  Negative for appetite change and fever  HENT: Negative  Negative for hearing loss, tinnitus, trouble swallowing and voice change  Eyes: Negative  Negative for photophobia and pain  Respiratory: Negative  Negative for shortness of breath  Cardiovascular: Negative  Negative for palpitations  Gastrointestinal: Negative  Negative for nausea and vomiting  Endocrine: Negative  Negative for cold intolerance  Genitourinary: Negative  Negative for dysuria, frequency and urgency  Musculoskeletal: Positive for back pain (lower back pain at times)  Negative for myalgias and neck pain  Joint pain in the morning at times   Skin: Negative  Negative for rash  Neurological: Negative  Negative for dizziness, tremors, seizures, syncope, facial asymmetry, speech difficulty, weakness, light-headedness, numbness and headaches  Hematological: Negative  Does not bruise/bleed easily  Psychiatric/Behavioral: Negative  Negative for confusion, hallucinations and sleep disturbance

## 2021-12-09 ENCOUNTER — PATIENT MESSAGE (OUTPATIENT)
Dept: FAMILY MEDICINE CLINIC | Facility: CLINIC | Age: 61
End: 2021-12-09

## 2021-12-10 PROCEDURE — U0003 INFECTIOUS AGENT DETECTION BY NUCLEIC ACID (DNA OR RNA); SEVERE ACUTE RESPIRATORY SYNDROME CORONAVIRUS 2 (SARS-COV-2) (CORONAVIRUS DISEASE [COVID-19]), AMPLIFIED PROBE TECHNIQUE, MAKING USE OF HIGH THROUGHPUT TECHNOLOGIES AS DESCRIBED BY CMS-2020-01-R: HCPCS | Performed by: FAMILY MEDICINE

## 2021-12-10 PROCEDURE — U0005 INFEC AGEN DETEC AMPLI PROBE: HCPCS | Performed by: FAMILY MEDICINE

## 2021-12-28 ENCOUNTER — PATIENT MESSAGE (OUTPATIENT)
Dept: FAMILY MEDICINE CLINIC | Facility: CLINIC | Age: 61
End: 2021-12-28

## 2021-12-28 DIAGNOSIS — Z20.822 EXPOSURE TO 2019 NOVEL CORONAVIRUS: Primary | ICD-10-CM

## 2021-12-30 PROCEDURE — 87636 SARSCOV2 & INF A&B AMP PRB: CPT | Performed by: FAMILY MEDICINE

## 2022-01-31 ENCOUNTER — TELEPHONE (OUTPATIENT)
Dept: NEUROLOGY | Facility: CLINIC | Age: 62
End: 2022-01-31

## 2022-01-31 NOTE — TELEPHONE ENCOUNTER
Pt called back and went over elevated Glucose level  Pt made aware that it will be discussed further at upcoming visit  No further questions

## 2022-01-31 NOTE — TELEPHONE ENCOUNTER
----- Message from "[employer] DO sent at 1/27/2022  1:07 PM EST -----  Blood sugar slightly high but otherwise normal , will discuss more at upcoming visit

## 2022-02-03 NOTE — TELEPHONE ENCOUNTER
I called and left a voicemail message about patients upcoming appointment with Dr Stanford Jones on 02/14/22 @ 9 am  I requested a call back to our office to confirm the appointment or if the patient has any issues or concerns or cannot keep this appointment

## 2022-02-10 NOTE — TELEPHONE ENCOUNTER
Called and spoke to patients wife Ricky Chang who confirmed patients upcoming apt with Dr Adair Valle on 02/14/22 @ 9 am  Patient has no issues or concerns at this time

## 2022-02-14 ENCOUNTER — OFFICE VISIT (OUTPATIENT)
Dept: NEUROLOGY | Facility: CLINIC | Age: 62
End: 2022-02-14
Payer: COMMERCIAL

## 2022-02-14 VITALS
WEIGHT: 199.4 LBS | RESPIRATION RATE: 18 BRPM | HEIGHT: 68 IN | BODY MASS INDEX: 30.22 KG/M2 | OXYGEN SATURATION: 99 % | HEART RATE: 57 BPM | DIASTOLIC BLOOD PRESSURE: 88 MMHG | TEMPERATURE: 97.3 F | SYSTOLIC BLOOD PRESSURE: 148 MMHG

## 2022-02-14 DIAGNOSIS — R25.1 TREMOR: ICD-10-CM

## 2022-02-14 DIAGNOSIS — G40.209 EPILEPSY WITH PARTIAL COMPLEX SEIZURES, WITHOUT STATUS EPILEPTICUS (HCC): ICD-10-CM

## 2022-02-14 DIAGNOSIS — R43.0 ANOSMIA: ICD-10-CM

## 2022-02-14 DIAGNOSIS — F41.9 ANXIETY: ICD-10-CM

## 2022-02-14 DIAGNOSIS — G47.01 INSOMNIA DUE TO MEDICAL CONDITION: ICD-10-CM

## 2022-02-14 DIAGNOSIS — R42 DIZZINESS: ICD-10-CM

## 2022-02-14 DIAGNOSIS — G40.209 NONINTRACTABLE EPILEPSY WITH COMPLEX PARTIAL SEIZURES (HCC): ICD-10-CM

## 2022-02-14 DIAGNOSIS — G25.81 RLS (RESTLESS LEGS SYNDROME): Primary | ICD-10-CM

## 2022-02-14 DIAGNOSIS — R68.89 SPELLS OF DECREASED ATTENTIVENESS: ICD-10-CM

## 2022-02-14 PROCEDURE — 3008F BODY MASS INDEX DOCD: CPT | Performed by: PSYCHIATRY & NEUROLOGY

## 2022-02-14 PROCEDURE — 1036F TOBACCO NON-USER: CPT | Performed by: PSYCHIATRY & NEUROLOGY

## 2022-02-14 PROCEDURE — 99214 OFFICE O/P EST MOD 30 MIN: CPT | Performed by: PSYCHIATRY & NEUROLOGY

## 2022-02-14 RX ORDER — GABAPENTIN 300 MG/1
300 CAPSULE ORAL
Qty: 30 CAPSULE | Refills: 1 | Status: SHIPPED | OUTPATIENT
Start: 2022-02-14 | End: 2022-02-16 | Stop reason: SDUPTHER

## 2022-02-14 RX ORDER — OXCARBAZEPINE 300 MG/1
TABLET, FILM COATED ORAL
Qty: 540 TABLET | Refills: 1 | Status: SHIPPED | OUTPATIENT
Start: 2022-02-14 | End: 2022-03-01 | Stop reason: SDUPTHER

## 2022-02-14 RX ORDER — DULOXETIN HYDROCHLORIDE 60 MG/1
60 CAPSULE, DELAYED RELEASE ORAL DAILY
Qty: 90 CAPSULE | Refills: 1 | Status: SHIPPED | OUTPATIENT
Start: 2022-02-14 | End: 2022-03-01 | Stop reason: SDUPTHER

## 2022-02-14 NOTE — PROGRESS NOTES
Patient ID: Alisha Land is a 57 y o  male  Assessment/Plan:    Nonintractable epilepsy with complex partial seizures ([address] )    Does have partial complex seizures in her well controlled with the current dose Trileptal 900 b i d  He does report auras but he attributes that worse to a sense of being tired  They are not associated with a sense of fullness abdomen    Insomnia due to medical condition      He does admit to fatigue  He is fatigued throughout the day  He does report a nightmares at night vivid dreams  He also describes movements in his legs  He may have a REM disorder  I have asked him to see sleep determine there is any other causes  He may require a sleep evaluation he also gives history of restlessness in his legs  I have asked him to obtain an iron profile  He does admit to eating well but after the and profile is done he can add some more spinach to his diet  We did provide him with a prescription of gabapentin of 300 mg at bedtime he can try  The excessive drowsiness may be and fatigue may be from a lack of restorative sleep       Diagnoses and all orders for this visit:    RLS (restless legs syndrome)  -     Iron Panel (Includes Ferritin, Iron Sat%, Iron, and TIBC); Future  -     Ambulatory Referral to Sleep Medicine; Future  -     gabapentin (Neurontin) 300 mg capsule; Take 1 capsule (300 mg total) by mouth daily at bedtime    Dizziness  -     DULoxetine (CYMBALTA) 60 mg delayed release capsule; Take 1 capsule (60 mg total) by mouth daily    Tremor  -     DULoxetine (CYMBALTA) 60 mg delayed release capsule; Take 1 capsule (60 mg total) by mouth daily    Anosmia  -     DULoxetine (CYMBALTA) 60 mg delayed release capsule; Take 1 capsule (60 mg total) by mouth daily    Anxiety  -     DULoxetine (CYMBALTA) 60 mg delayed release capsule;  Take 1 capsule (60 mg total) by mouth daily    Spells of decreased attentiveness  -     OXcarbazepine (TRILEPTAL) 300 mg tablet; TAKE 3 TABLETS BY MOUTH IN THE MORNING AND 3 TABLETS AT NIGHT 90 day supply    Epilepsy with partial complex seizures, without status epilepticus (HCC)  -     OXcarbazepine (TRILEPTAL) 300 mg tablet; TAKE 3 TABLETS BY MOUTH IN THE MORNING AND 3 TABLETS AT NIGHT 90 day supply    Nonintractable epilepsy with complex partial seizures ([facility] [address] )    Insomnia due to medical condition         Subjective:    this is a 57year-old patient who presents in a follow up visit   He is accompanied Madonna Quijano  He Does report the spells/ auras have improved  In the past they were characterized by   They are characterized by sense warmth which goes up to stomach with disorientation and losing feeling in his hands and feet  They are associated with a staring spell  He is on Trileptal 900 mg b i d  His recent sodium was normal   He denies any staring spells but Skye Landry has noted intermittent staring spells  They are not associated with a fullness  He attributes this to fatigue  He also reports some wrist this is in his legs and vivid dreams and a lack of restorative sleep  He is able to fall asleep but has difficulty maintaining sleep  In the past sleep evaluation was suggested which was not pursued  He is willing to pursue Andrade them  He does admit to eating well but he does have risk this in his legs                On July 28, 2019 due to the episodes he was seen in the emergency room at [facility]    brain ischemia and/or cardiac etiology was  ruled out  It does limit his ability to drive  He has not been driving  He was then evaluated by his family [de-identified] office in September  At that point he was placed on Cymbalta 30 mg tablets  The dose of Cymbalta was increased to 60 mg a day  It was also felt that he could have a component of partial complex seizures and he did undergo an EEG which was abnormal   It demonstrated bilateral temporal slowing left focal temporal slowing and sharp waves emanating from the left temporal region  Attempts to reduce duloxetine did result in more  irritability spells                overall his spirits have improved  He is now back to full-time  He works [employer]  Balance problems improved as he is now exercising              Mri of brain 12/23/19 No acute intracranial abnormality or pathologic intracranial enhancement      Normal hippocampal and parahippocampal gyri  No evidence of mesial temporal sclerosis      Nonspecific subcortical frontal white matter signal abnormality  These findings can be seen in patients with migraines and/or chronic microangiopathic disease                      The following portions of the patient's history were reviewed and updated as appropriate:   He  has a past medical history of Anxiety, Arthritis, Carpal tunnel syndrome, bilateral, Epilepsy ([address] ), Exercises daily, Seizure disorder ([address] ), and Wrist pain  He  has a past surgical history that includes Cataract extraction (Bilateral); Wrist surgery (Left); Colonoscopy (2010); EGD (08/2019); Colonoscopy (02/2021); Appendectomy; Foot surgery (Right); and pr revise median n/carpal tunnel surg (Bilateral, 3/19/2021)  His family history includes Breast cancer in his sister; Diabetes in his maternal aunt; No Known Problems in his father and mother  He  reports that he quit smoking about 2 years ago  His smoking use included cigarettes  He has a 90 00 pack-year smoking history  He has never used smokeless tobacco  He reports previous alcohol use  He reports that he does not use drugs    Current Outpatient Medications   Medication Sig Dispense Refill    DULoxetine (CYMBALTA) 60 mg delayed release capsule Take 1 capsule (60 mg total) by mouth daily 90 capsule 1    Ibuprofen (Advil) 200 MG CAPS Take by mouth as needed      OXcarbazepine (TRILEPTAL) 300 mg tablet TAKE 3 TABLETS BY MOUTH IN THE MORNING AND 3 TABLETS AT NIGHT 90 day supply 540 tablet 1    gabapentin (Neurontin) 300 mg capsule Take 1 capsule (300 mg total) by mouth daily at bedtime 30 capsule 1    LORazepam (ATIVAN) 0 5 mg tablet Take 1 tablet (0 5 mg total) by mouth every 8 (eight) hours as needed for seizures (Patient not taking: Reported on 7/24/2020) 10 tablet 0    oxyCODONE (ROXICODONE) 5 mg immediate release tablet Take 1 tablet (5 mg total) by mouth every 6 (six) hours as needed for severe pain for up to 5 dosesMax Daily Amount: 20 mg (Patient not taking: Reported on 7/9/2021) 5 tablet 0     Current Facility-Administered Medications   Medication Dose Route Frequency Provider Last Rate Last Admin    lidocaine-epinephrine (XYLOCAINE/EPINEPHRINE) 1 %-1:100,000 injection 30 mL  30 mL Infiltration Once Kenisha Coelho MD         He has No Known Allergies            Objective:    Blood pressure 148/88, pulse 57, temperature (!) 97 3 °F (36 3 °C), temperature source Tympanic, resp  rate 18, height 5' 8" (1 727 m), weight 90 4 kg (199 lb 6 4 oz), SpO2 99 %  Physical Exam  Eyes:      General: Lids are normal       Extraocular Movements: Extraocular movements intact  Pupils: Pupils are equal, round, and reactive to light  Neurological:      Deep Tendon Reflexes: Strength normal       Reflex Scores:       Tricep reflexes are 2+ on the right side and 2+ on the left side  Bicep reflexes are 2+ on the right side and 2+ on the left side  Patellar reflexes are 2+ on the right side and 2+ on the left side  Achilles reflexes are 1+ on the right side and 1+ on the left side  Neurological Exam  Mental Status  Awake, alert and oriented to person, place and time  Oriented to person, place and time  Language is fluent with no aphasia  Cranial Nerves  CN II: Visual acuity is normal  Visual fields full to confrontation  CN III, IV, VI: Extraocular movements intact bilaterally  Normal lids and orbits bilaterally  Pupils equal round and reactive to light bilaterally    CN V: Facial sensation is normal   CN VII: Full and symmetric facial movement  CN VIII: Hearing is normal   CN IX, X: Palate elevates symmetrically  Normal gag reflex  CN XI: Shoulder shrug strength is normal   CN XII: Tongue midline without atrophy or fasciculations  Motor  Normal muscle bulk throughout  No fasciculations present  Strength is 5/5 throughout all four extremities  Sensory  Light touch is normal in upper and lower extremities  Temperature is normal in upper and lower extremities  Reflexes                                           Right                      Left  Biceps                                 2+                         2+  Triceps                                2+                         2+  Patellar                                2+                         2+  Achilles                                1+                         1+  Plantar                           Downgoing                Downgoing    Coordination  Right: Finger-to-nose normal   Left: Finger-to-nose normal     Gait  Normal casual, toe, heel and tandem gait  Able to rise from chair without using arms  Review of systems obtained from the medical assistant as below was reviewed with the patient at today's  ROS:    Review of Systems   Constitutional: Negative  Negative for appetite change and fever  HENT: Negative  Negative for hearing loss, tinnitus, trouble swallowing and voice change  Eyes: Negative  Negative for photophobia and pain  Respiratory: Negative  Negative for shortness of breath  Cardiovascular: Negative  Negative for palpitations  Gastrointestinal: Negative  Negative for nausea and vomiting  Endocrine: Negative  Negative for cold intolerance  Genitourinary: Negative  Negative for dysuria, frequency and urgency  Musculoskeletal: Positive for joint swelling (joint pain)  Negative for myalgias and neck pain  Skin: Negative  Negative for rash  Neurological: Positive for tremors (mild tremors in hands)   Negative for dizziness, seizures, syncope, facial asymmetry, speech difficulty, weakness, light-headedness, numbness and headaches  Hematological: Bruises/bleeds easily  Psychiatric/Behavioral: Positive for sleep disturbance  Negative for confusion and hallucinations         He is to follow up in our offices in six months

## 2022-02-14 NOTE — ASSESSMENT & PLAN NOTE
Does have partial complex seizures in her well controlled with the current dose Trileptal 900 b i d  He does report auras but he attributes that worse to a sense of being tired    They are not associated with a sense of fullness abdomen

## 2022-02-16 DIAGNOSIS — G25.81 RLS (RESTLESS LEGS SYNDROME): ICD-10-CM

## 2022-02-16 RX ORDER — GABAPENTIN 300 MG/1
300 CAPSULE ORAL
Qty: 30 CAPSULE | Refills: 1 | Status: SHIPPED | OUTPATIENT
Start: 2022-02-16

## 2022-02-16 NOTE — TELEPHONE ENCOUNTER
Patient would like gabapentin send to [facility] [place] in [facility] instead of [facility] order below, please sign if agreeable

## 2022-02-16 NOTE — TELEPHONE ENCOUNTER
----- Message from Leeann Salas sent at 2/16/2022  7:36 AM EST -----  Regarding: FW: gabapentine    ----- Message -----  From: Mirian Garcia  Sent: 2/15/2022   7:24 PM EST  To: [facility]  Subject: gabapentine                                      It looks like the Gabapentine medication was send to the mail in order pharmacy  Is it possible to cancel that and have it sent to the [facility]?

## 2022-02-22 ENCOUNTER — TELEPHONE (OUTPATIENT)
Dept: NEUROLOGY | Facility: CLINIC | Age: 62
End: 2022-02-22

## 2022-02-22 NOTE — TELEPHONE ENCOUNTER
----- Message from Easton Verdin sent at 2/21/2022  7:04 AM EST -----  Regarding: FW: new pharmacy    ----- Message -----  From: Angeli Jeffers  Sent: 2/19/2022   6:34 PM EST  To: [facility]  Subject: new pharmacy                                     Our apologies, our mail in pharmacy changed  Please let me know if you should have issues sending the script  We can always request the refill if that is easier  Thank You for your time!   [address] [place] [address], [address] [place] [address]

## 2022-03-01 ENCOUNTER — TELEPHONE (OUTPATIENT)
Dept: NEUROLOGY | Facility: CLINIC | Age: 62
End: 2022-03-01

## 2022-03-01 NOTE — TELEPHONE ENCOUNTER
Pt's wife calling in. Left VM. Stated pt's pharmacy is now [facility]. Asking if you can resend scripts for :    Duloxetine 60 mg capsules 1 cap daily for a 90 day supply    And Trileptal 300 mg tabs  3 tabs in the AM and 3 tabs at HS for a 90 day supply.    Please cancel out scripts that were sent out on the 2/14/22 to [facility], and per pt request send to [facility].      Pharmacy updated in pt chart.      Thank you.

## 2022-09-09 ENCOUNTER — TELEPHONE (OUTPATIENT)
Dept: NEUROLOGY | Facility: CLINIC | Age: 62
End: 2022-09-09

## 2022-09-09 NOTE — TELEPHONE ENCOUNTER
Pt has called and confirmed his appt with Andrea Ivan on 9/21/22 at 8 am at the [facility] office      Thank you,     Corina Alexander

## 2022-09-21 ENCOUNTER — OFFICE VISIT (OUTPATIENT)
Dept: NEUROLOGY | Facility: CLINIC | Age: 62
End: 2022-09-21
Payer: COMMERCIAL

## 2022-09-21 VITALS
TEMPERATURE: 98 F | HEART RATE: 53 BPM | WEIGHT: 192 LBS | BODY MASS INDEX: 29.1 KG/M2 | HEIGHT: 68 IN | DIASTOLIC BLOOD PRESSURE: 68 MMHG | RESPIRATION RATE: 16 BRPM | SYSTOLIC BLOOD PRESSURE: 133 MMHG

## 2022-09-21 DIAGNOSIS — G47.52 REM BEHAVIORAL DISORDER: ICD-10-CM

## 2022-09-21 DIAGNOSIS — G40.209 PARTIAL SYMPTOMATIC EPILEPSY WITH COMPLEX PARTIAL SEIZURES, NOT INTRACTABLE, WITHOUT STATUS EPILEPTICUS (HCC): ICD-10-CM

## 2022-09-21 DIAGNOSIS — G47.01 INSOMNIA DUE TO MEDICAL CONDITION: Primary | ICD-10-CM

## 2022-09-21 DIAGNOSIS — G25.81 RLS (RESTLESS LEGS SYNDROME): ICD-10-CM

## 2022-09-21 PROCEDURE — 99213 OFFICE O/P EST LOW 20 MIN: CPT | Performed by: PSYCHIATRY & NEUROLOGY

## 2022-09-21 RX ORDER — GABAPENTIN 300 MG/1
300 CAPSULE ORAL
Qty: 30 CAPSULE | Refills: 1 | Status: SHIPPED | OUTPATIENT
Start: 2022-09-21 | End: 2022-09-21 | Stop reason: SDUPTHER

## 2022-09-21 NOTE — PROGRESS NOTES
Patient ID: Amena Cunningham is a 60 y o  male  Assessment/Plan:      Nonintractable epilepsy with complex partial seizures (HCC)  Amena Cunningham is a 60 y o  male w/ PMH focal onset impaired aware seizures currently adequately controlled on oxcarbazepine 900 mg bid though he does note auras of "feeling off" which his wife describes as him staring off  These are not associated with the abdominal sx he had w/ seizures and he attributes this more to fatigue from increased work-load  No full seizures in many years   Continue oxcarbazepine 900 mg bid - did discuss importance of trying to take at regular times   Discussed that patient's tremors are unlikely to be related to his epilepsy or epilepsy medication     Insomnia and excess fatigue  Patient does admit to fatigue which he attributes to increase work but he and his wife both admit he falls asleep easily during the day (e g  when sitting in his recliner)  He feels he is getting good sleep but admits to intermittent periods of frequent "night terrors" which sound like REM behavior disorder (acting out dreams, screaming, talking)  He also notes that his legs jerk around sometimes which can be disruptive in the evenings and at night  Consistent w/ possible RLS   Sleep medicine referral for evaluation - patient and his wife will consider addressing this   Trial gabapentin 300 mg qhs for sleep problems   Discussed possibility of cutting back on duloxetine but wife noted that the last time this was attempted there was some issue that did come up    Tremors  Patient describes and exhibits a RUE > LUE resting and postural tremor with limited kinetic component that is worse in the evenings and when he is overworked  He notes that this is not bothering him much and has not changed significantly though it does bother his wife a bit   He denies constipation, dysautonomic sx, hypophonia, micrographia, and on exam has only mildly asymmetrically increased tone RUE > LUE w/ contralateral activation  Admits to falls but notes that this has been ongoing for years and has not changed   Gabapentin prescribed above may help with this   Given suspected REM behavior disorder and anosmia reported in chart, is worth continuing to monitor for Parkinsonian sx but overall most of these have not emerged at this time     Diagnoses and all orders for this visit:    Insomnia due to medical condition  -     Ambulatory Referral to Sleep Medicine; Future    REM behavioral disorder  -     Ambulatory Referral to Sleep Medicine; Future    RLS (restless legs syndrome)  -     gabapentin (Neurontin) 300 mg capsule; Take 1 capsule (300 mg total) by mouth daily at bedtime    Partial symptomatic epilepsy with complex partial seizures, not intractable, without status epilepticus (HCC)  -     CBC and differential; Future  -     Oxcarbazepine level; Future  -     Comprehensive metabolic panel; Future         Subjective:    Viridiana Samaniego is a 60 y o  male w/ PMH focal onset impaired aware seizures currently adequately controlled on oxcarbazepine 900 mg bid though he does note auras of "feeling off" which his wife describes as him staring off  These off episodes are not associated with the abdominal fullness sx he had w/ seizures and he attributes this more to fatigue from increased work-load  No full seizures in many years  He does admit to sometimes taking the medication off schedule  He admits that 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 and his wife feels that he is not focusing sometimes but pt feels this may be related to work-related fatigue or taking his medicine off of schedule  Does agree with wife he is getting auras where he feels "off" but nothing like his previous seizures/episodes (which haven't recurred in a very long time)  These auras last a couple minutes  Maybe occurring once or twice a week   Patient does admit to fatigue which he attributes to increased work load but he and his wife both admit he falls asleep easily during the day (e g  when sitting in his recliner)  He feels he is getting good sleep but admits to intermittent periods of frequent "night terrors" which sound like REM behavior disorder (acting out dreams, screaming, talking)  These happen in spurts  Can have a week of it nightly and then it stops for a while  He notes that he only tried the gabapentin prescribed at the last visit a couple of nights and his wife does not recall him taking it at all  He also notes that his legs jerk around sometimes which can be disruptive in the evenings and at night  Consistent w/ possible RLS  He has not yet completed his Sleep medicine evaluation - patient and his wife will consider addressing this before the next visit  He also agreed to trial the gabapentin 300 mg qhs for a more consistent period of time  Discussed possibility of cutting back on duloxetine but wife noted that the last time this was attempted there was some issue  Patient and wife describe a tremor that is worse in the evenings and when he is overworked  This happens at rest but does not seem to interfere with his ability to eat, drink, etc  Not worse with anxiety  He notes that this is not bothering him much and has not changed significantly though it does bother his wife a bit  He denies constipation, dysautonomic sx, hypophonia, micrographia, and on exam has only mildly asymmetrically increased tone RUE > LUE w/ contralateral activation  Admits to falls but notes that this has been ongoing for years and has not changed  Given suspected REM behavior disorder and anosmia reported in chart, is worth continuing to monitor for Parkinsonian sx but overall most of these have not emerged at this time w/ no masked facies, bradykinesia, gait dysfunction and only mildly increased tone   Patient otherwise Denies N/V/F/C, abdominal pain, dizziness, HA, visual changes, new weakness or sensory changes, bowel or bladder dysfunction    The following portions of the patient's history were reviewed and updated as appropriate:   He  has a past medical history of Anxiety, Arthritis, Carpal tunnel syndrome, bilateral, Epilepsy ([facility] [address] ), Exercises daily, Seizure disorder ([facility] [address] ), and Wrist pain  He   Patient Active Problem List    Diagnosis Date Noted    Insomnia due to medical condition 02/14/2022    Balance disorder 08/16/2021    Cubital tunnel syndrome on right     Carpal tunnel syndrome, bilateral 11/02/2020    Diarrhea 02/24/2020    Other specified anxiety disorders 01/20/2020    Nonintractable epilepsy with complex partial seizures ([facility] [address] ) 12/09/2019    Spells of decreased attentiveness 10/14/2019    Tremors of nervous system 10/14/2019    Cataract 10/28/2015     He  has a past surgical history that includes Cataract extraction (Bilateral); Wrist surgery (Left); Colonoscopy (2010); EGD (08/2019); Colonoscopy (02/2021); Appendectomy; Foot surgery (Right); and pr revise median n/carpal tunnel surg (Bilateral, 3/19/2021)  His family history includes Breast cancer in his sister; Diabetes in his maternal aunt; No Known Problems in his father and mother  He  reports that he quit smoking about 3 years ago  His smoking use included cigarettes  He has a 90 00 pack-year smoking history  He has never used smokeless tobacco  He reports previous alcohol use  He reports that he does not use drugs    Current Outpatient Medications   Medication Sig Dispense Refill    DULoxetine (CYMBALTA) 60 mg delayed release capsule Take 1 capsule (60 mg total) by mouth daily 90 capsule 3    gabapentin (Neurontin) 300 mg capsule Take 1 capsule (300 mg total) by mouth daily at bedtime 30 capsule 1    Ibuprofen 200 MG CAPS Take by mouth as needed      OXcarbazepine (TRILEPTAL) 300 mg tablet TAKE 3 TABLETS BY MOUTH IN THE MORNING AND 3 TABLETS AT NIGHT 90 day supply 540 tablet 3    LORazepam (ATIVAN) 0 5 mg tablet Take 1 tablet (0 5 mg total) by mouth every 8 (eight) hours as needed for seizures (Patient not taking: No sig reported) 10 tablet 0    oxyCODONE (ROXICODONE) 5 mg immediate release tablet Take 1 tablet (5 mg total) by mouth every 6 (six) hours as needed for severe pain for up to 5 dosesMax Daily Amount: 20 mg (Patient not taking: No sig reported) 5 tablet 0     Current Facility-Administered Medications   Medication Dose Route Frequency Provider Last Rate Last Admin    lidocaine-epinephrine (XYLOCAINE/EPINEPHRINE) 1 %-1:100,000 injection 30 mL  30 mL Infiltration Once Leora Aguila MD         Current Outpatient Medications on File Prior to Visit   Medication Sig    DULoxetine (CYMBALTA) 60 mg delayed release capsule Take 1 capsule (60 mg total) by mouth daily    Ibuprofen 200 MG CAPS Take by mouth as needed    OXcarbazepine (TRILEPTAL) 300 mg tablet TAKE 3 TABLETS BY MOUTH IN THE MORNING AND 3 TABLETS AT NIGHT 90 day supply    [DISCONTINUED] gabapentin (Neurontin) 300 mg capsule Take 1 capsule (300 mg total) by mouth daily at bedtime    LORazepam (ATIVAN) 0 5 mg tablet Take 1 tablet (0 5 mg total) by mouth every 8 (eight) hours as needed for seizures (Patient not taking: No sig reported)    oxyCODONE (ROXICODONE) 5 mg immediate release tablet Take 1 tablet (5 mg total) by mouth every 6 (six) hours as needed for severe pain for up to 5 dosesMax Daily Amount: 20 mg (Patient not taking: No sig reported)     Current Facility-Administered Medications on File Prior to Visit   Medication    lidocaine-epinephrine (XYLOCAINE/EPINEPHRINE) 1 %-1:100,000 injection 30 mL     He has No Known Allergies       Objective:    Blood pressure 133/68, pulse (!) 53, temperature 98 °F (36 7 °C), temperature source Temporal, resp  rate 16, height 5' 8" (1 727 m), weight 87 1 kg (192 lb)  Physical Exam   Vitals reviewed  Constitutional:    Not in acute distress  Normal appearance  Not ill-appearing, toxic-appearing or diaphoretic     HENT:   Normocephalic and atraumatic  External ear normal b/l  Nose normal  No congestion or rhinorrhea  Mucous membranes are moist   Oropharynx is clear  No oropharyngeal exudate or posterior oropharyngeal erythema  Eyes:    No scleral icterus  No discharge b/l  Conjunctivae normal    Pulmonary:  Pulmonary effort is normal  No respiratory distress  GI: abdomen not distended but mildly protuberant  Musculoskeletal: no gross deformities  Skin:   Skin is not pale  Psychiatric:       Mood normal  Affect congruent    Neurological Exam  Mental Status   Alert and oriented to person, place, and time  Attention is normal  Speech is fluent w/ naming and repetition intact and no dysarthria    Cranial Nerves   CN II Visual fields full to confrontation  CN III, IV, VI PERRL, brisk reactivity and consensual response intact b/l  EOMI w/o CN VI or III palsy  No clear nystagmus  CN V, VII Facial sensation and expression full, symmetric  CN VIII Hearing grossly symmetric  CN IX, X Palate symmetric  CN XI trapezius strength symmetric  CN XII no dysarthria, symmetric lingual protrusion     Motor Exam   Muscle bulk grossly normal  Pronator drift absent b/l  No fasciculations  Mildly increased tone RUE > LUE w/ contralateral activation  Strength: R/L     Deltoid:    5/5    Biceps:    5/5   Triceps:   5/5  Iliopsoas: 5/5   Quads: 5/5   Hamstrin/5   AT:  5/5   Gastroc: 5/5       Sensory Exam   Light touch, vibration, temperature symmetric BUE/BLE    Gait, Coordination, and Reflexes   FTN normal  Low amplitude medium frequency rest and postural tremor observed RUE > LUE  Reflexes: R/L      Brachioradialis: 2+/2+   Biceps: 2+/2+     Pateller: 2+/2+    Gait: casual gait with average stance, stride length, arm swing    ROS:    Review of Systems   Constitutional: Negative  Negative for appetite change and fever  HENT: Negative  Negative for hearing loss, tinnitus, trouble swallowing and voice change  Eyes: Negative    Negative for photophobia and pain  Respiratory: Negative  Negative for shortness of breath  Cardiovascular: Negative  Negative for palpitations  Gastrointestinal: Negative  Negative for nausea and vomiting  Endocrine: Negative  Negative for cold intolerance  Genitourinary: Negative  Negative for dysuria, frequency and urgency  Musculoskeletal: Negative  Negative for myalgias and neck pain  Skin: Negative  Negative for rash  Neurological: Positive for tremors (After done working (hands)) and weakness (Morning only)  Negative for dizziness, seizures, syncope, facial asymmetry, speech difficulty, light-headedness, numbness and headaches  Hematological: Negative  Does not bruise/bleed easily  Psychiatric/Behavioral: Positive for sleep disturbance (Nightmares)  Negative for confusion and hallucinations

## 2022-09-21 NOTE — TELEPHONE ENCOUNTER
gabapentin (Neurontin) 300 mg capsule 30 capsule 1 9/21/2022     Sig - Route: Take 1 capsule (300 mg total) by mouth daily at bedtime - Oral    Sent to pharmacy as: Gabapentin 300 MG Oral Capsule (Neurontin)    E-Prescribing Status: Transmission to pharmacy in progress (9/21/2022  8:37 AM EDT)      "----- Message -----   From: Natalia Pena MD   Sent: 9/21/2022   1:56 PM EDT   To: Owen Dumont DO   Subject: RE: E-Prescribed Message Needing Your Attent*     Hi! For some reason this patient's gabapentin order did not go through when I ordered it, would you be able to order this when you get the chance? Thank you! N   ----- Message -----   From: Interface, Surescripts Out   Sent: 9/21/2022   1:29 PM EDT   To: Natalia Pena MD   Subject: E-Prescribed Message Needing Your Attention       An error occurred while processing the e-prescribing message  The message was not sent electronically to the requested pharmacy  Contact the pharmacy about the new prescription "    "Owen Dumont DO -  Please call in script for gabapentin"    Kg Lewis, [phone] spoke with [facility]  Gave verbal order for gabapentin 300 mg daily at bedtime  Pended order below as "phone in" for documentation purposes

## 2022-09-22 ENCOUNTER — OFFICE VISIT (OUTPATIENT)
Dept: SLEEP CENTER | Facility: CLINIC | Age: 62
End: 2022-09-22
Payer: COMMERCIAL

## 2022-09-22 VITALS
SYSTOLIC BLOOD PRESSURE: 136 MMHG | BODY MASS INDEX: 29.1 KG/M2 | HEART RATE: 56 BPM | DIASTOLIC BLOOD PRESSURE: 72 MMHG | HEIGHT: 68 IN | WEIGHT: 192 LBS

## 2022-09-22 DIAGNOSIS — G47.33 OSA (OBSTRUCTIVE SLEEP APNEA): Primary | ICD-10-CM

## 2022-09-22 DIAGNOSIS — G25.81 RLS (RESTLESS LEGS SYNDROME): ICD-10-CM

## 2022-09-22 DIAGNOSIS — G47.52 REM BEHAVIORAL DISORDER: ICD-10-CM

## 2022-09-22 PROCEDURE — 99204 OFFICE O/P NEW MOD 45 MIN: CPT | Performed by: PHYSICIAN ASSISTANT

## 2022-09-22 NOTE — ASSESSMENT & PLAN NOTE
I am not 100% convinced the patient has restless leg syndrome as it seems to be more of a soothing mechanism  After he becomes aware that he is moving his legs, he is able to control the movement and stop it  He denies pain in his legs  His neurologist prescribed him Neurontin 300 mg to take at bedtime for possible restless leg symptoms as well as a tremor he has been experiencing  He took the Neurontin for the 1st time last night and had no negative side effects  He states he will try to take it nightly until we see him next to see if it helps with his possible restless leg symptoms  Patient had a CBC and iron panel performed in February both of which were normal   Ferritin was noted to be 186

## 2022-09-22 NOTE — PROGRESS NOTES
Consultation - Enma Dub 36, 1960, MRN: [id number]    9/22/2022        Reason for Consult / Principal Problem:    REM Behavior Disorder  Moderate obstructive sleep apnea   Restless Legs Syndrome    Thank you for the opportunity of participating in the evaluation and care of this patient in the Sleep Clinic at [facility]  Subjective:     HPI: Georgie Xiao is a 60y o  year old male who presents to us upon referral from Neurology  Patient states his neurologist has been urging him to have a sleep evaluation for a number of years secondary to possible REM behavior sleep disorder and restless leg symptoms  He states his neurologist was questioning if he is having nocturnal seizures versus acting out his dreams  Patient states he had a sleep study approximately 10 years ago which to his knowledge was normal   After getting a copy of the patient's sleep study, it was noted he had moderate obstructive sleep apnea with an AHI of 17  Patient was surprised by this and had no idea he had sleep apnea  He states he never used CPAP  Currently, the patient states he sleeps quite well most nights  He states every 1-2 months he will have episodes of nightmares and acting out dreams  He states in a week he will have 2-3 nights with nightmares and then will not have another episode for approximately 1-2 months  He describes these episodes of having a nightmare where he is fighting or in combat  His wife will then here him screaming, mumbling loudly, kicking, punching, and she will wake him up to get him out of his dream   He states after she wakes him up he is able to return to sleep without having a reoccurring nightmare  He states he has episodes typically happen between 1:00 a m  and 4:00 a m     If his wife did not wake him, he states he would be unaware that he was having these episodes    He states he only time he woke up on his own during a nightmare was when he actually fell out of his bed  He states after having a nightmare he feels normal the next day  He does not feel unusually sleepy or sore  Patient states he did serve in [employer] but did not have any traumatic experiences  He relates in childhood he was taken away from his parents at the age of 10 and bounced between and many homes until his aunt and uncle took him in at the age of 14  Patient describes his restless leg symptoms as more of a soothing sweeping motion  He states once he is aware that he is moving his legs he is able to control the movement and sleep easily  Patient had an iron panel and CBC done 2/14/2022 which was normal   His ferritin was noted to be 186  States his neurologist has recently prescribed him Neurontin 300 mg to be taking 1 at night in order to improve his possible restless leg symptoms as well as a tremor that he has been experiencing  He states he was reluctant to take another medication and has just started taking it last night  He states he had no negative side effects and is willing to give it a chance at this point  Patient states he is willing to have a sleep study performed and discuss possible treatment after the sleep study is obtained          Comorbid conditions:  Epilepsy       Review of Systems      Genitourinary none   Cardiology none   Gastrointestinal none   Neurology none   Constitutional none   Integumentary none   Psychiatry none   Musculoskeletal joint pain and muscle aches   Pulmonary none   ENT none   Endocrine none   Hematological none           Sleep Study Results:    Had a sleep study done 08/05/2010 which showed moderate obstructive sleep apnea with an AHI of 17 oxygen karissa of 83% also noted periodic limb movements which may have contributed to sleep fragmentation, weight at the time of study was 166 lb    CPAP Equipment:  Never use CPAP equipment    Employment:  [employer], working 7:00 a m  to 3:30 p m  M-F and on-call 24 hours a day 7 days a week, drive to work 35 minutes, no CDL    Sleep Schedule:       Bedtime:  Work days 9:00 p m , days off 9:30 p m  Latency:  15-30 minutes or sooner at times       Wakeup time:  3:30 a m  on work days, wakes up and feels refreshed and can be sore depending on what he did at work, 8:30 a m  on days off    Awakenings:       Frequency:  Once if having a nightmare, wife wakes him,  most times zero if no nightmares       Causes:  Nightmares, Urination, or knowing a have to get up for work      Duration:  Few minutes    Daytime Sleepiness / Inappropriate Sleep:       Most severe: If sitting and resting, if active will not feel tired      Naps :  None        Inappropriate drowsiness / sleep:  Can doze in the evenings     Snoring:  Snores loudly at times and wife will wake him     Apnea: Wife witness if sitting a chair sleeping     Change in Weight:  Gained 20 lb over the last year    Restless Leg Syndrome:  Possible clinical symptoms consistent with this diagnosis, may be a soothing mechanism  Patient is able to control the movement once he is aware that he is doing it  His neurologist currently gave him Neurontin 300 mg to be taken 1 nightly  Ferritin level was 186 on 02/14/2022  Other Complaints:   No reports of sleep walking,+ sleep talking, yelling + Kicking punching fighting  Denies sleep paralysis or hallucinations surrounding sleep  Denies waking up with headaches, bruxism, and dry mouth  Social History:      Caffeine:  24 oz per day coffee      Tobacco:   reports that he quit smoking about 3 years ago  His smoking use included cigarettes  He has a 90 00 pack-year smoking history   He has never used smokeless tobacco      E-cig/Vaping:    E-Cigarette/Vaping    E-Cigarette Use Never User       E-Cigarette/Vaping Substances    Nicotine No     THC No     CBD No     Flavoring No     Other No     Unknown No          Alcohol: reports previous alcohol use  No alcohol use     Drugs:   reports no history of drug use  The review of systems and following portions of the patient's history were reviewed and updated as appropriate: allergies, current medications, past family history, past medical history, past social history, past surgical history and problem list         Objective:       Vitals:    09/22/22 1406   BP: 136/72   BP Location: Left arm   Patient Position: Sitting   Cuff Size: Large   Pulse: 56   Weight: 87 1 kg (192 lb)   Height: 5' 8" (1 727 m)     Body mass index is 29 19 kg/m²  Neck Circumference: 17  [place] Sleepiness Scale: Total score: 19      Current Outpatient Medications:     DULoxetine (CYMBALTA) 60 mg delayed release capsule, Take 1 capsule (60 mg total) by mouth daily, Disp: 90 capsule, Rfl: 3    gabapentin (Neurontin) 300 mg capsule, Take 1 capsule (300 mg total) by mouth daily at bedtime, Disp: 30 capsule, Rfl: 1    OXcarbazepine (TRILEPTAL) 300 mg tablet, TAKE 3 TABLETS BY MOUTH IN THE MORNING AND 3 TABLETS AT NIGHT 90 day supply, Disp: 540 tablet, Rfl: 3    Ibuprofen 200 MG CAPS, Take by mouth as needed (Patient not taking: Reported on 9/22/2022), Disp: , Rfl:     LORazepam (ATIVAN) 0 5 mg tablet, Take 1 tablet (0 5 mg total) by mouth every 8 (eight) hours as needed for seizures (Patient not taking: No sig reported), Disp: 10 tablet, Rfl: 0    oxyCODONE (ROXICODONE) 5 mg immediate release tablet, Take 1 tablet (5 mg total) by mouth every 6 (six) hours as needed for severe pain for up to 5 dosesMax Daily Amount: 20 mg (Patient not taking: No sig reported), Disp: 5 tablet, Rfl: 0    Current Facility-Administered Medications:     lidocaine-epinephrine (XYLOCAINE/EPINEPHRINE) 1 %-1:100,000 injection 30 mL, 30 mL, Infiltration, Once, Mendez Adhikari MD    Physical Exam  General Appearance:   Alert, cooperative, no distress, appears stated age       Head:   Normocephalic, without obvious abnormality, atraumatic     Eyes:   PERRL, conjunctiva/corneas clear          Nose:  Nares normal, septum midline, mucosa normal, no drainage or sinus tenderness           Throat:  Lips, teeth and gums normal; tongue normal in size and midline in position; mucosa moist, uvula normal, tonsils not visualized, Mallampati class 3-4       Neck:  Supple, symmetrical, trachea midline, no adenopathy; no thyromegaly noted, no carotid bruit or JVD     Lungs:      Clear to auscultation bilaterally, respirations unlabored     Heart:   Regular rate and rhythm, S1 and S2 normal, no murmur, rub or gallop       Extremities:  Extremities normal, atraumatic, no cyanosis or edema       Skin:  Skin color, texture, turgor normal, no rashes or lesions       Neurologic:  No focal deficits noted  ASSESSMENT / PLAN     1  DAWSON (obstructive sleep apnea)  Assessment & Plan:  Patient was unaware that he has obstructive sleep apnea  He states he was told his sleep study was normal   Upon review of his sleep study from 08/05/2010, it showed moderate obstructive sleep apnea with an AHI of 17 and oxygen karissa of 83 %  Periodic limb movements were also noted at that time  His weight at the time of the study was 166 lb  We are ordering an extended sleep study which could reassess for likely obstructive sleep apnea  Patient states he prefers to review the test prior to any CPAP treatment  He will be returning approximately 1 week after the test is performed in order to review results and possible treatment plan  Orders:  -     Extended Diagnostic Sleep Study; Future; Expected date: 09/22/2022    2  REM behavioral disorder  Assessment & Plan:  Patient appears to be acting out his dreams in his sleep  An extended sleep study was ordered today to assess for REM behavior disorder and/or nocturnal seizures    Patient was advised for safety reasons he may want to consider using a bed rail to prevent him from falling out of the bed and also remove items from around the nightstand or sleeping area which he may knock over and hurt himself with  Patient will return approximately 1 week after the sleep study is obtained in order to discuss the results and possible treatment  Orders:  -     Ambulatory Referral to Sleep Medicine  -     Extended Diagnostic Sleep Study; Future; Expected date: 09/22/2022    3  RLS (restless legs syndrome)  Assessment & Plan:  I am not 100% convinced the patient has restless leg syndrome as it seems to be more of a soothing mechanism  After he becomes aware that he is moving his legs, he is able to control the movement and stop it  He denies pain in his legs  His neurologist prescribed him Neurontin 300 mg to take at bedtime for possible restless leg symptoms as well as a tremor he has been experiencing  He took the Neurontin for the 1st time last night and had no negative side effects  He states he will try to take it nightly until we see him next to see if it helps with his possible restless leg symptoms  Patient had a CBC and iron panel performed in February both of which were normal   Ferritin was noted to be 186  Orders:  -     Ambulatory Referral to Sleep Medicine  -     Extended Diagnostic Sleep Study; Future; Expected date: 09/22/2022         Counseling / Coordination of Care  Total clinic time spent today 70 minutes in chart review, face-to-face time spent with the patient, coordination of care and documentation  A description of the counseling / coordination of care:     diagnostic results, instructions for management, risk factor reductions, prognosis, patient and family education, impressions, risks and benefits of treatment options and importance of compliance with treatment    The following instructions have been given to the patient today:    Patient Instructions   Please get your extended sleep study as soon as possible  I will follow back with you after the study to review results and possible treatment  Nursing Support:  When: Monday through Friday 7A-5PM except holidays  Where: Our direct line is [phone]  If you are having a true emergency please call 911  In the event that the line is busy or it is after hours please leave a voice message and we will return your call  Please speak clearly, leaving your full name, birth date, best number to reach you and the reason for your call  Medication refills: We will need the name of the medication, the dosage, the ordering provider, whether you get a 30 or 90 day refill, and the pharmacy name and address  Medications will be ordered by the provider only  Nurses cannot call in prescriptions  Please allow 7 days for medication refills  Physician requested updates: If your provider requested that you call with an update after starting medication, please be ready to provide us the medication and dosage, what time you take your medication, the time you attempt to fall asleep, time you fall asleep, when you wake up, and what time you get out of bed  Sleep Study Results: We will contact you with sleep study results and/or next steps after the physician has reviewed your testing               MITCHELL Clark 14

## 2022-09-22 NOTE — ASSESSMENT & PLAN NOTE
Patient appears to be acting out his dreams in his sleep  An extended sleep study was ordered today to assess for REM behavior disorder and/or nocturnal seizures  Patient was advised for safety reasons he may want to consider using a bed rail to prevent him from falling out of the bed and also remove items from around the nightstand or sleeping area which he may knock over and hurt himself with  Patient will return approximately 1 week after the sleep study is obtained in order to discuss the results and possible treatment

## 2022-09-22 NOTE — ASSESSMENT & PLAN NOTE
Patient was unaware that he has obstructive sleep apnea  He states he was told his sleep study was normal   Upon review of his sleep study from 08/05/2010, it showed moderate obstructive sleep apnea with an AHI of 17 and oxygen karissa of 83 %  Periodic limb movements were also noted at that time  His weight at the time of the study was 166 lb  We are ordering an extended sleep study which could reassess for likely obstructive sleep apnea  Patient states he prefers to review the test prior to any CPAP treatment  He will be returning approximately 1 week after the test is performed in order to review results and possible treatment plan

## 2022-09-22 NOTE — PATIENT INSTRUCTIONS
Please get your extended sleep study as soon as possible  I will follow back with you after the study to review results and possible treatment  Nursing Support:  When: Monday through Friday 7A-5PM except holidays  Where: Our direct line is [phone]  If you are having a true emergency please call 911  In the event that the line is busy or it is after hours please leave a voice message and we will return your call  Please speak clearly, leaving your full name, birth date, best number to reach you and the reason for your call  Medication refills: We will need the name of the medication, the dosage, the ordering provider, whether you get a 30 or 90 day refill, and the pharmacy name and address  Medications will be ordered by the provider only  Nurses cannot call in prescriptions  Please allow 7 days for medication refills  Physician requested updates: If your provider requested that you call with an update after starting medication, please be ready to provide us the medication and dosage, what time you take your medication, the time you attempt to fall asleep, time you fall asleep, when you wake up, and what time you get out of bed  Sleep Study Results: We will contact you with sleep study results and/or next steps after the physician has reviewed your testing

## 2022-09-23 ENCOUNTER — TELEPHONE (OUTPATIENT)
Dept: NEUROLOGY | Facility: CLINIC | Age: 62
End: 2022-09-23

## 2022-09-23 NOTE — TELEPHONE ENCOUNTER
Ask him to stop the gabapentin       He can contact us in 2 to 3 weeks   If the symptoms are still bother some , we can try Mirapex

## 2022-09-23 NOTE — TELEPHONE ENCOUNTER
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

## 2022-09-26 RX ORDER — GABAPENTIN 300 MG/1
300 CAPSULE ORAL
Qty: 30 CAPSULE | Refills: 1 | Status: SHIPPED | OUTPATIENT
Start: 2022-09-26

## 2022-10-09 ENCOUNTER — NURSE TRIAGE (OUTPATIENT)
Dept: OTHER | Facility: OTHER | Age: 62
End: 2022-10-09

## 2022-10-09 NOTE — TELEPHONE ENCOUNTER
Reason for Disposition  • [1] [id number] diagnosed by positive lab test (e g , PCR, rapid self-test kit) AND [2] mild symptoms (e g , cough, fever, others) AND [5] no complications or SOB    Answer Assessment - Initial Assessment Questions  1  COVID-19 DIAGNOSIS: "Who made your COVID-19 diagnosis?" "Was it confirmed by a positive lab test or self-test?" If not diagnosed by a doctor (or NP/PA), ask "Are there lots of cases (community spread) where you live?" Note: See [facility] health department website, if unsure  Home tiest  2  COVID-19 EXPOSURE: "Was there any known exposure to COVID before the symptoms began?" CDC Definition of close contact: within 6 feet (2 meters) for a total of 15 minutes or more over a 24-hour period  Unsure   3  ONSET: "When did the COVID-19 symptoms start?"       6 days ago  4  WORST SYMPTOM: "What is your worst symptom?" (e g , cough, fever, shortness of breath, muscle aches)      Achy nausea diarrhea  5  COUGH: "Do you have a cough?" If Yes, ask: "How bad is the cough?"        No   6  FEVER: "Do you have a fever?" If Yes, ask: "What is your temperature, how was it measured, and when did it start?"      No   7  RESPIRATORY STATUS: "Describe your breathing?" (e g , shortness of breath, wheezing, unable to speak)       normal  8  BETTER-SAME-WORSE: "Are you getting better, staying the same or getting worse compared to yesterday?"  If getting worse, ask, "In what way?"      Better   9  HIGH RISK DISEASE: "Do you have any chronic medical problems?" (e g , asthma, heart or lung disease, weak immune system, obesity, etc )      No   10  VACCINE: "Have you had the COVID-19 vaccine?" If Yes, ask: "Which one, how many shots, when did you get it?"        Yes   11  BOOSTER: "Have you received your COVID-19 booster?" If Yes, ask: "Which one and when did you get it?"        Yes   12  PREGNANCY: "Is there any chance you are pregnant?" "When was your last menstrual period?"        Yes    13   OTHER SYMPTOMS: "Do you have any other symptoms?"  (e g , chills, fatigue, headache, loss of smell or taste, muscle pain, sore throat)        No   14   O2 SATURATION MONITOR:  "Do you use an oxygen saturation monitor (pulse oximeter) at home?" If Yes, ask "What is your reading (oxygen level) today?" "What is your usual oxygen saturation reading?" (e g , 95%)        No    Protocols used: CORONAVIRUS (COVID-19) DIAGNOSED OR SUSPECTED-ADULT-

## 2022-10-09 NOTE — TELEPHONE ENCOUNTER
Regarding: Covid care advise  ----- Message from Tima Carbone sent at 10/9/2022 11:10 AM EDT -----  "I have body aches, congestion, cough, sore throat, diarrhea, and headaches   I tested positive for Covid "

## 2022-11-21 ENCOUNTER — HOSPITAL ENCOUNTER (OUTPATIENT)
Dept: SLEEP CENTER | Facility: CLINIC | Age: 62
Discharge: HOME | End: 2022-11-21

## 2022-11-21 DIAGNOSIS — G25.81 RLS (RESTLESS LEGS SYNDROME): ICD-10-CM

## 2022-11-21 DIAGNOSIS — G47.52 REM BEHAVIORAL DISORDER: ICD-10-CM

## 2022-11-21 DIAGNOSIS — G47.33 OSA (OBSTRUCTIVE SLEEP APNEA): ICD-10-CM

## 2022-11-22 NOTE — PROGRESS NOTES
Sleep Study Documentation    Pre-Sleep Study       Sleep testing procedure explained to patient:YES    Patient napped prior to study:NO    [address] [place] worker after 12PM   Caffeine use:NO    Alcohol:Dayshift workers after 5PM: Alcohol use:NO    Typical day for patient:YES       Study Documentation    Sleep Study Indications: snoring, excessive daytime sleepiness, parasomnia, hx of seizures, dream enactment behavior, Extended Montage per order     Sleep Study: Diagnostic   Snore: Moderate  Supplemental O2: no        Minimum SaO2 80  Baseline SaO2 94            EKG abnormalities: no     EEG abnormalities: no    Sleep Study Recorded < 2 hours: N/A    Sleep Study Recorded > 2 hours but incomplete study: N/A    Sleep Study Recorded 6 hours but no sleep obtained: NO          Post-Sleep Study    Medication used at bedtime or during sleep study:NO    Patient reports time it took to fall asleep:20 to 30 minutes    Patient reports waking up during study:1 to 2 times  Patient reports returning to sleep in 10 to 30 minutes  Patient reports sleeping 6 to 8 hours without dreaming  Patient reports sleep during study:typical    Patient rated sleepiness: Not sleepy or tired    PAP treatment:no

## 2022-12-01 DIAGNOSIS — G47.33 OSA (OBSTRUCTIVE SLEEP APNEA): Primary | ICD-10-CM

## 2022-12-08 ENCOUNTER — TELEPHONE (OUTPATIENT)
Dept: SLEEP CENTER | Facility: CLINIC | Age: 62
End: 2022-12-08

## 2022-12-08 NOTE — TELEPHONE ENCOUNTER
Left message for the patient to call back to review sleep study results and schedule DME set up and compliance appointments

## 2022-12-08 NOTE — TELEPHONE ENCOUNTER
----- Message from Alexander Alves PA-C sent at 12/1/2022 11:33 AM EST -----  Moderate sleep apnea noted with an apnea/hypopnea index (AHI) of 16 9 events per hour of sleep  The AHI in the supine position was 77 6  Treatment is recommended for obstructive sleep apnea, auto-CPAP at the range of 5-15 cm h20 is recommended  I ordered this today  Please have patient start as soon as possible and follow up in 31-91 days after starting treatment  Try to avoid sleeping on his back as sleep apnea was much worse in this position  A single test can not completely rule out REM sleep behavior disorder so if symptoms persist, especially with treatment of obstructive sleep apnea, repeat sleep testing can be considered  If nocturnal seizures remain a concern, consideration could be made for ambulatory EEG or inpatient epilepsy monitoring for further assessment  We can discuss at his next appointment

## 2022-12-14 ENCOUNTER — TELEPHONE (OUTPATIENT)
Dept: NEUROLOGY | Facility: CLINIC | Age: 62
End: 2022-12-14

## 2022-12-14 NOTE — TELEPHONE ENCOUNTER
Hi, my name is Lincoln Reason  [id number]  I've been trying to get back to someone from Dr Gely Menendez office for like 3 days now  I'm home  Please call me back  I'm just returning your call  Thank you   Bye

## 2022-12-15 ENCOUNTER — TELEPHONE (OUTPATIENT)
Dept: OTHER | Facility: OTHER | Age: 62
End: 2022-12-15

## 2022-12-15 NOTE — TELEPHONE ENCOUNTER
Patient called saying that he is returning a phone call that he had got from the office regarding his sleep study and is asking if the office can give him a back a call

## 2022-12-15 NOTE — TELEPHONE ENCOUNTER
Received VM transcription from [phone]:    Hi, my name's Loisaroldo Cabrera  I'm returning a call about a sleep study  I've been trying for like 3 or 4 days now  All right, thank you   Bye

## 2023-01-23 DIAGNOSIS — R25.1 TREMOR: ICD-10-CM

## 2023-01-23 DIAGNOSIS — R68.89 SPELLS OF DECREASED ATTENTIVENESS: ICD-10-CM

## 2023-01-23 DIAGNOSIS — F41.9 ANXIETY: ICD-10-CM

## 2023-01-23 DIAGNOSIS — R42 DIZZINESS: ICD-10-CM

## 2023-01-23 DIAGNOSIS — G40.209 EPILEPSY WITH PARTIAL COMPLEX SEIZURES, WITHOUT STATUS EPILEPTICUS (HCC): ICD-10-CM

## 2023-01-23 DIAGNOSIS — R43.0 ANOSMIA: ICD-10-CM

## 2023-01-23 RX ORDER — OXCARBAZEPINE 300 MG/1
TABLET, FILM COATED ORAL
Qty: 540 TABLET | Refills: 3 | Status: SHIPPED | OUTPATIENT
Start: 2023-01-23

## 2023-01-23 RX ORDER — DULOXETIN HYDROCHLORIDE 60 MG/1
CAPSULE, DELAYED RELEASE ORAL
Qty: 90 CAPSULE | Refills: 3 | Status: SHIPPED | OUTPATIENT
Start: 2023-01-23

## 2023-02-08 ENCOUNTER — TELEPHONE (OUTPATIENT)
Dept: NEUROLOGY | Facility: CLINIC | Age: 63
End: 2023-02-08

## 2023-02-08 NOTE — TELEPHONE ENCOUNTER
Called and spoke to patient   Patient confirmed upcoming apt with Dr Kita Martinez on 02/22/23 @ 9:30 am

## 2023-02-22 ENCOUNTER — OFFICE VISIT (OUTPATIENT)
Dept: NEUROLOGY | Facility: CLINIC | Age: 63
End: 2023-02-22

## 2023-02-22 ENCOUNTER — TELEPHONE (OUTPATIENT)
Dept: SLEEP CENTER | Facility: CLINIC | Age: 63
End: 2023-02-22

## 2023-02-22 ENCOUNTER — TELEPHONE (OUTPATIENT)
Dept: NEUROLOGY | Facility: CLINIC | Age: 63
End: 2023-02-22

## 2023-02-22 VITALS
BODY MASS INDEX: 29.1 KG/M2 | RESPIRATION RATE: 18 BRPM | HEART RATE: 61 BPM | OXYGEN SATURATION: 97 % | TEMPERATURE: 97 F | DIASTOLIC BLOOD PRESSURE: 64 MMHG | WEIGHT: 192 LBS | SYSTOLIC BLOOD PRESSURE: 125 MMHG | HEIGHT: 68 IN

## 2023-02-22 DIAGNOSIS — G47.33 OSA (OBSTRUCTIVE SLEEP APNEA): ICD-10-CM

## 2023-02-22 DIAGNOSIS — G40.209 NONINTRACTABLE EPILEPSY WITH COMPLEX PARTIAL SEIZURES (HCC): Primary | ICD-10-CM

## 2023-02-22 NOTE — PROGRESS NOTES
Patient ID: Yari Guerrero is a 60 y o  male  Assessment/Plan:    Nonintractable epilepsy with complex partial seizures ([facility] [address] )  Does have a history of partial complex seizures  They are well controlled with the use of Tegretol 900 mg twice daily  He denies any similar nocturnal type seizures  Did have laboratory studies performed  The results were available after his appointment today  We will contact the patient and let them know they were essentially normal   They will be no change in his antiepileptic  Also be continued on a low-dose of duloxetine    DAWSON (obstructive sleep apnea)  Seen by sleep medicine and a sleep study was performed  He is concerned regarding the results  He has not been able to connect with the sleep department  I have asked him and his wife to send a message to the department and to schedule a follow-up appointment    Due to his job he is unable to answer his own phone during the day  There are no diagnoses linked to this encounter  Subjective:    Yari Guerrero is a 60 y o  male w/ PMH focal onset impaired aware seizures currently adequately controlled on oxcarbazepine 900 mg bid   Denies any recurrent episodes and the staring off episodes have improved  Frandy Haynes He feels he is getting good sleep but admits to intermittent periods of frequent "night terrors" which sound like REM behavior disorder (acting out dreams, screaming, talking)  These happen in spurts  Can have a week of it nightly and then it stops for a while  He did try gabapentin but this resulted in nausea and he stopped it after several doses  Since he only has the symptoms intermittently he is quite reluctant to utilize the medication on a regular basis  He did undergo sleep evaluation and a sleep study  This was abnormal but he has been unable to connect with the sleep department regarding the diagnosis and potential treatments            He has now moved to sleeping a good arrangements    He does sleep in another room and his bed is against the wall to prevent falling  He reports flailing at times and screaming  He does snore and has been reluctant to proceed with CPAP  He has also been reluctant to utilize medications on a regular basis  He does complain of a tremor involving the right upper extremity but he has no Parkinson's like features  Did occur at rest but it was at of a lower amplitude  He reports at times the tremors can worsen                     The following portions of the patient's history were reviewed and updated as appropriate:   He  has a past medical history of Anxiety, Arthritis, Carpal tunnel syndrome, bilateral, Epilepsy ([address] ), Exercises daily, Seizure disorder ([address] ), and Wrist pain  He  has a past surgical history that includes Cataract extraction (Bilateral); Wrist surgery (Left); Colonoscopy (2010); EGD (08/2019); Colonoscopy (02/2021); Appendectomy; Foot surgery (Right); and pr neuroplasty &/transpos median nrv carpal tunne (Bilateral, 3/19/2021)  His family history includes Breast cancer in his sister; Diabetes in his maternal aunt; No Known Problems in his father and mother  He  reports that he quit smoking about 3 years ago  His smoking use included cigarettes  He has a 90 00 pack-year smoking history  He has never used smokeless tobacco  He reports that he does not currently use alcohol  He reports that he does not use drugs    Current Outpatient Medications   Medication Sig Dispense Refill   • DULoxetine (CYMBALTA) 60 mg delayed release capsule TAKE 1 CAPSULE DAILY 90 capsule 3   • OXcarbazepine (TRILEPTAL) 300 mg tablet TAKE 3 TABLETS IN THE MORNING AND 3 TABLETS AT NIGHT 540 tablet 3   • Ibuprofen 200 MG CAPS Take by mouth as needed (Patient not taking: Reported on 9/22/2022)     • LORazepam (ATIVAN) 0 5 mg tablet Take 1 tablet (0 5 mg total) by mouth every 8 (eight) hours as needed for seizures (Patient not taking: Reported on 7/24/2020) 10 tablet 0   • oxyCODONE (ROXICODONE) 5 mg immediate release tablet Take 1 tablet (5 mg total) by mouth every 6 (six) hours as needed for severe pain for up to 5 dosesMax Daily Amount: 20 mg (Patient not taking: No sig reported) 5 tablet 0     Current Facility-Administered Medications   Medication Dose Route Frequency Provider Last Rate Last Admin   • lidocaine-epinephrine (XYLOCAINE/EPINEPHRINE) 1 %-1:100,000 injection 30 mL  30 mL Infiltration Once Windy Zaman MD         He has No Known Allergies            Objective:    Blood pressure 125/64, pulse 61, temperature (!) 97 °F (36 1 °C), temperature source Tympanic, resp  rate 18, height 5' 8" (1 727 m), weight 87 1 kg (192 lb), SpO2 97 %  Physical Exam  Eyes:      General: Lids are normal       Extraocular Movements: Extraocular movements intact  Pupils: Pupils are equal, round, and reactive to light  Neurological:      Motor: Motor strength is normal       Deep Tendon Reflexes:      Reflex Scores:       Tricep reflexes are 1+ on the right side and 1+ on the left side  Bicep reflexes are 2+ on the right side and 2+ on the left side  Patellar reflexes are 2+ on the right side and 2+ on the left side  Achilles reflexes are 1+ on the right side and 1+ on the left side  Neurological Exam  Mental Status  Awake, alert and oriented to person, place and time  Oriented to person, place and time  Language is fluent with no aphasia  Cranial Nerves  CN II: Visual acuity is normal  Visual fields full to confrontation  CN III, IV, VI: Extraocular movements intact bilaterally  Normal lids and orbits bilaterally  Pupils equal round and reactive to light bilaterally  CN V: Facial sensation is normal   CN VII: Full and symmetric facial movement  CN VIII: Hearing is normal   CN IX, X: Palate elevates symmetrically  Normal gag reflex  CN XI: Shoulder shrug strength is normal   CN XII: Tongue midline without atrophy or fasciculations      Motor  Normal muscle bulk throughout  No fasciculations present  Strength is 5/5 throughout all four extremities  Sensory  Light touch is normal in upper and lower extremities  Temperature is normal in upper and lower extremities  Reflexes                                            Right                      Left  Biceps                                 2+                         2+  Triceps                                1+                         1+  Patellar                                2+                         2+  Achilles                                1+                         1+  Plantar                           Downgoing                Downgoing    Coordination  Right: Finger-to-nose normal Left: Finger-to-nose normal     Gait  Normal casual, toe, heel and tandem gait  Able to rise from chair without using arms  Review of systems obtained from the medical assistant as below was reviewed with the patient at today's visit  ROS:    Review of Systems   Constitutional: Negative  Negative for appetite change and fever  HENT: Negative  Negative for hearing loss, tinnitus, trouble swallowing and voice change  Eyes: Negative  Negative for photophobia, pain and visual disturbance  Respiratory: Negative  Negative for shortness of breath  Cardiovascular: Negative  Negative for palpitations  Gastrointestinal: Negative  Negative for nausea and vomiting  Endocrine: Negative  Negative for cold intolerance  Genitourinary: Negative  Negative for dysuria, frequency and urgency  Musculoskeletal: Positive for back pain (low back pain)  Negative for gait problem, myalgias and neck pain  Skin: Negative  Negative for rash  Allergic/Immunologic: Negative  Neurological: Negative  Negative for dizziness, tremors, seizures, syncope, facial asymmetry, speech difficulty, weakness, light-headedness, numbness and headaches  Hematological: Negative  Does not bruise/bleed easily  Psychiatric/Behavioral: Negative  Negative for confusion, hallucinations and sleep disturbance  Can return to our offices in several months  Discussed various medications but he is quite reluctant to initiate medication  I did inform him that the CPAP may help his ability to sleep, these episodes without the use of medications and did he did agree to follow-up with sleep medicine

## 2023-02-22 NOTE — TELEPHONE ENCOUNTER
Received Teams message from sleep office practice manager Selma Coleman, stating patient's wife had some questions regarding sleep study results  Called and spoke to wife Petar Hutchins and reviewed sleep study results  Scheduled DME set up 3/8/23 in [place]  Rx for CPAP and clinical information sent to Yess[facility] Xie via [employer]  Scheduled compliance follow up 4/11/23

## 2023-02-22 NOTE — ASSESSMENT & PLAN NOTE
Does have a history of partial complex seizures  They are well controlled with the use of Tegretol 900 mg twice daily  He denies any similar nocturnal type seizures  Did have laboratory studies performed  The results were available after his appointment today  We will contact the patient and let them know they were essentially normal   They will be no change in his antiepileptic      Also be continued on a low-dose of duloxetine

## 2023-02-22 NOTE — TELEPHONE ENCOUNTER
Team, his laboratory studies were available after his appointment today  Please let him know that carbamazepine level and CBC was normal   His CMP does show slightly elevated blood sugar which may have been due to the fact that he ate prior to the laboratory studies  We will not change any of his medications    If he does not answer the phone phone please send a message to his wife on MyChart thank

## 2023-02-22 NOTE — ASSESSMENT & PLAN NOTE
Seen by sleep medicine and a sleep study was performed  He is concerned regarding the results  He has not been able to connect with the sleep department  I have asked him and his wife to send a message to the department and to schedule a follow-up appointment    Due to his job he is unable to answer his own phone during the day

## 2023-02-23 LAB

## 2023-02-27 LAB

## 2023-03-08 ENCOUNTER — TELEPHONE (OUTPATIENT)
Dept: SLEEP CENTER | Facility: CLINIC | Age: 63
End: 2023-03-08

## 2023-03-08 LAB

## 2023-03-08 NOTE — TELEPHONE ENCOUNTER
Pt  was set up today in [facility] on a ResMed S11 5-15cm and given a F20 (m) mask

## 2023-05-18 ENCOUNTER — OFFICE VISIT (OUTPATIENT)
Dept: SLEEP CENTER | Facility: CLINIC | Age: 63
End: 2023-05-18

## 2023-05-18 VITALS
BODY MASS INDEX: 29.55 KG/M2 | DIASTOLIC BLOOD PRESSURE: 66 MMHG | SYSTOLIC BLOOD PRESSURE: 141 MMHG | HEIGHT: 68 IN | WEIGHT: 195 LBS | HEART RATE: 63 BPM

## 2023-05-18 DIAGNOSIS — G47.52 REM BEHAVIORAL DISORDER: ICD-10-CM

## 2023-05-18 DIAGNOSIS — G47.33 OSA (OBSTRUCTIVE SLEEP APNEA): Primary | ICD-10-CM

## 2023-05-18 DIAGNOSIS — G25.81 RLS (RESTLESS LEGS SYNDROME): ICD-10-CM

## 2023-05-18 NOTE — PROGRESS NOTES
Progress Note - 1996 [facility] 60 y o  male   RXP:[birth year], MRN: [id number]  5/18/2023          Follow Up Evaluation / Problem: Moderate Obstructive Sleep Apnea  REM behavior sleep disorder  Restless Leg Syndrome     Thank you for the opportunity of participating in the evaluation and care of this patient in the Sleep Clinic at [facility]  HPI: Kassie Inman is a 60y o  year old male  The patient presents for follow up of obstructive sleep apnea, REM behavior sleep disorder, and restless leg syndrome  He had an extended diagnostic sleep study performed on 11/21/2022 which showed mild obstructive sleep apnea with an AHI of 16 9, supine AHI of 77 6 and AHI during REM sleep of 18 5  Mild desaturations were present with respiratory events  It was also noted he had increased periodic limb movement during sleep which did not contribute to significant sleep disruption and cortical arousals  No evidence of REM behavior sleep disorder was noted  Auto CPAP was recommended and ordered  Due to miscommunication, he was not set up until March 8  Patient states he was previously prescribed gabapentin for restless leg syndrome  He states he does not like to take any medications that are unnecessary, and he stopped the medication  He does not have any significant concern for restless leg symptoms  He states when he is extremely fatigued and he rests at night, he will note that his legs move back and forth  He is usually able to fall asleep quickly when this happens due to significant sleepiness  He states of those nights he would be more likely to have dream enactment behavior  He has noted since starting CPAP that it is possible his dream enactment behavior has improved  He states he has not had any vivid dreams in the last few weeks    However, he does note that he must be moving around in his sleep quite a bit as he tends to be wrapped up in his CPAP hose and his mask will shift during the night  Once his mask has shifted usually around 2 to 4 AM, he is up for the night  He states without awakening due to the CPAP he needs to get up at 4 AM for work  He feels he sleeps better on the weekends as he has less stress and things on his mind while he is trying to sleep  Although he has had significant difficulty in adjusting to using his CPAP, he is committed to using it nightly  He states he would like to continue using it for the next few months and reassess  Current Outpatient Medications:   •  DULoxetine (CYMBALTA) 60 mg delayed release capsule, TAKE 1 CAPSULE DAILY, Disp: 90 capsule, Rfl: 3  •  OXcarbazepine (TRILEPTAL) 300 mg tablet, TAKE 3 TABLETS IN THE MORNING AND 3 TABLETS AT NIGHT, Disp: 540 tablet, Rfl: 3  •  Ibuprofen 200 MG CAPS, Take by mouth as needed (Patient not taking: Reported on 5/18/2023), Disp: , Rfl:   •  LORazepam (ATIVAN) 0 5 mg tablet, Take 1 tablet (0 5 mg total) by mouth every 8 (eight) hours as needed for seizures (Patient not taking: Reported on 5/18/2023), Disp: 10 tablet, Rfl: 0  •  oxyCODONE (ROXICODONE) 5 mg immediate release tablet, Take 1 tablet (5 mg total) by mouth every 6 (six) hours as needed for severe pain for up to 5 dosesMax Daily Amount: 20 mg (Patient not taking: No sig reported), Disp: 5 tablet, Rfl: 0    Current Facility-Administered Medications:   •  lidocaine-epinephrine (XYLOCAINE/EPINEPHRINE) 1 %-1:100,000 injection 30 mL, 30 mL, Infiltration, Once, Sarah Haines MD    How likely are you to doze off or fall asleep in the following situations, in contrast to feeling just tired? Sitting and reading: High chance of dozing  Watching TV: Moderate chance of dozing  Sitting, inactive in a public place (e g  a theatre or a meeting):  Moderate chance of dozing  As a passenger in a car for an hour without a break: High chance of dozing  Lying down to rest in the afternoon "when circumstances permit: High chance of dozing  Sitting and talking to someone: Slight chance of dozing  Sitting quietly after a lunch without alcohol: Slight chance of dozing  In a car, while stopped for a few minutes in traffic: Slight chance of dozing  Total score: 16              Vitals:    05/18/23 1257   BP: 141/66   BP Location: Left arm   Patient Position: Sitting   Cuff Size: Adult   Pulse: 63   Weight: 88 5 kg (195 lb)   Height: 5' 8\" (1 727 m)       Body mass index is 29 65 kg/m²  EPWORTH SLEEPINESS SCORE  Total score: 16      Past History Since Last Sleep Center Visit:     Began auto CPAP treatment on 3/8/2023  Patient has noted a reduction in his vivid dreams  He notes he is moving significantly in his sleep as his CPAP equipment is wrapped around him and his mask shifts  Patient states he is not interested in taking any other medications such as melatonin or Neurontin  Patient states he prefers not to take any medications that are not absolutely necessary  The patient reports that he cleans the equipment appropriately and changes supplies on a regular basis      The review of systems and following portions of the patient's history were reviewed and updated as appropriate: allergies, current medications, past family history, past medical history, past social history, past surgical history, and problem list         OBJECTIVE  Equipment set up date:  3/8/23  PAP Pressure: Nasal AutoPAP using a lower limit of 5 cm and an upper limit of 15 cm of water pressure  Type of mask used: full face  DME Provider: Lilliam Hall  Denies aerophagia or AM headaches    Physical Exam:     General Appearance:   Alert, cooperative, no distress, appears stated age                                                             ASSESSMENT / PLAN    1  DAWSON (obstructive sleep apnea)  Assessment & Plan:  The patient was noted to have moderate obstructive sleep apnea on a sleep study that was performed in November " 2022   He is currently using auto CPAP with difficulty in adjusting to using the equipment  He states due to frequent movements at nighttime, he finds that the mask shifts and the hose wraps around him  He will continue to use it nightly to see if he is able to adjust to it  He will follow-up in 3 to 4 months  2  REM behavioral disorder  Assessment & Plan:  Patient has noted a reduction in his vivid dreams in the last few weeks with use of CPAP  He is hopeful with continued use that his REM behavior sleep disorder could improve  I offered the patient to try melatonin  Patient declined trying any new medications  The patient is very against taking medications less they are absolutely necessary  3  RLS (restless legs syndrome)  Assessment & Plan:  Patient describes leg movement when he becomes significantly fatigued  He states this is not significantly bothersome to him  He is able to fall asleep quickly and the movement resolves  Counseling / Coordination of Care  I have spent a total time of 35 minutes on 05/18/23 in caring for this patient including Diagnostic results, Prognosis, Risks and benefits of tx options, Instructions for management, Patient and family education, Importance of tx compliance, Risk factor reductions, Impressions, Counseling / Coordination of care, Documenting in the medical record, Reviewing / ordering tests, medicine, procedures   and Obtaining or reviewing history    Today I reviewed the patient's compliance data  he has been able to use the equipment 100% of all days recorded  Average usage was 4 or more hours 83% of all days recorded  The patient uses the equipment for an average of 6 hours and 3 minutes per night  The estimated AHI is 6 3 abnormal breathing events per hour  The patient feels they benefit from the use of PAP equipment and would like to continue PAP therapy  Response to treatment has been good    A prescription for supplies has been provided to last for the next year  He will continue using this equipment at the settings noted above for the next 4 months  At that timehe will then return for a routine follow-up evaluation  I have asked the patient to contact the Sleep [address] if he encounters any difficulties prior to that time  The following instructions have been given to the patient today:    Patient Instructions   Continue to use your CPAP nightly  Hopefully you will continue to adjust to it  We can follow-up with you in the next 3 to 4 months and reassess  Nursing Support:  When: Monday through Friday 7A-5PM except holidays  Where: Our direct line is [phone]  If you are having a true emergency please call 911  In the event that the line is busy or it is after hours please leave a voice message and we will return your call  Please speak clearly, leaving your full name, birth date, best number to reach you and the reason for your call  Medication refills: We will need the name of the medication, the dosage, the ordering provider, whether you get a 30 or 90 day refill, and the pharmacy name and address  Medications will be ordered by the provider only  Nurses cannot call in prescriptions  Please allow 7 days for medication refills  Physician requested updates: If your provider requested that you call with an update after starting medication, please be ready to provide us the medication and dosage, what time you take your medication, the time you attempt to fall asleep, time you fall asleep, when you wake up, and what time you get out of bed  Sleep Study Results: We will contact you with sleep study results and/or next steps after the physician has reviewed your testing             MITCHELL [address]

## 2023-05-18 NOTE — ASSESSMENT & PLAN NOTE
Patient describes leg movement when he becomes significantly fatigued  He states this is not significantly bothersome to him  He is able to fall asleep quickly and the movement resolves

## 2023-05-18 NOTE — ASSESSMENT & PLAN NOTE
The patient was noted to have moderate obstructive sleep apnea on a sleep study that was performed in November 2022  He is currently using auto CPAP with difficulty in adjusting to using the equipment  He states due to frequent movements at nighttime, he finds that the mask shifts and the hose wraps around him  He will continue to use it nightly to see if he is able to adjust to it  He will follow-up in 3 to 4 months

## 2023-05-18 NOTE — PATIENT INSTRUCTIONS
Continue to use your CPAP nightly  Hopefully you will continue to adjust to it  We can follow-up with you in the next 3 to 4 months and reassess  Nursing Support:  When: Monday through Friday 7A-5PM except holidays  Where: Our direct line is [phone]  If you are having a true emergency please call 911  In the event that the line is busy or it is after hours please leave a voice message and we will return your call  Please speak clearly, leaving your full name, birth date, best number to reach you and the reason for your call  Medication refills: We will need the name of the medication, the dosage, the ordering provider, whether you get a 30 or 90 day refill, and the pharmacy name and address  Medications will be ordered by the provider only  Nurses cannot call in prescriptions  Please allow 7 days for medication refills  Physician requested updates: If your provider requested that you call with an update after starting medication, please be ready to provide us the medication and dosage, what time you take your medication, the time you attempt to fall asleep, time you fall asleep, when you wake up, and what time you get out of bed  Sleep Study Results: We will contact you with sleep study results and/or next steps after the physician has reviewed your testing

## 2023-05-18 NOTE — ASSESSMENT & PLAN NOTE
Patient has noted a reduction in his vivid dreams in the last few weeks with use of CPAP  He is hopeful with continued use that his REM behavior sleep disorder could improve  I offered the patient to try melatonin  Patient declined trying any new medications  The patient is very against taking medications less they are absolutely necessary

## 2023-08-01 ENCOUNTER — TELEPHONE (OUTPATIENT)
Dept: NEUROLOGY | Facility: CLINIC | Age: 63
End: 2023-08-01

## 2023-08-01 NOTE — TELEPHONE ENCOUNTER
I called and left a voicemail message reminding the patient of there upcoming appointment with Dr. Leopoldo Farah on 8/30/23 @ 8 am. I requested a call back to our office to confirm the appointment or if the patient has any issues or concerns or cannot keep this appointment.

## 2023-08-22 ENCOUNTER — RA CDI HCC (OUTPATIENT)
Dept: OTHER | Facility: HOSPITAL | Age: 63
End: 2023-08-22

## 2023-08-22 NOTE — PROGRESS NOTES
[address] [facility] coding opportunities       Chart reviewed, no opportunity found: CHART REVIEWED, NO OPPORTUNITY FOUND        Patients Insurance        Commercial Insurance: Joaquin Ayers

## 2023-08-30 ENCOUNTER — APPOINTMENT (OUTPATIENT)
Dept: LAB | Facility: CLINIC | Age: 63
End: 2023-08-30
Payer: COMMERCIAL

## 2023-08-30 ENCOUNTER — OFFICE VISIT (OUTPATIENT)
Dept: NEUROLOGY | Facility: CLINIC | Age: 63
End: 2023-08-30
Payer: COMMERCIAL

## 2023-08-30 VITALS
SYSTOLIC BLOOD PRESSURE: 147 MMHG | HEIGHT: 68 IN | WEIGHT: 191.8 LBS | BODY MASS INDEX: 29.07 KG/M2 | RESPIRATION RATE: 18 BRPM | OXYGEN SATURATION: 98 % | TEMPERATURE: 98.1 F | DIASTOLIC BLOOD PRESSURE: 69 MMHG | HEART RATE: 54 BPM

## 2023-08-30 DIAGNOSIS — G40.209 NONINTRACTABLE EPILEPSY WITH COMPLEX PARTIAL SEIZURES (HCC): ICD-10-CM

## 2023-08-30 DIAGNOSIS — G40.209 NONINTRACTABLE EPILEPSY WITH COMPLEX PARTIAL SEIZURES (HCC): Primary | ICD-10-CM

## 2023-08-30 DIAGNOSIS — G47.33 OSA (OBSTRUCTIVE SLEEP APNEA): ICD-10-CM

## 2023-08-30 LAB
ALBUMIN SERPL BCP-MCNC: 4.3 G/DL (ref 3.5–5)
ALP SERPL-CCNC: 53 U/L (ref 34–104)
ALT SERPL W P-5'-P-CCNC: 14 U/L (ref 7–52)
ANION GAP SERPL CALCULATED.3IONS-SCNC: 4 MMOL/L
AST SERPL W P-5'-P-CCNC: 12 U/L (ref 13–39)
BILIRUB SERPL-MCNC: 0.3 MG/DL (ref 0.2–1)
BUN SERPL-MCNC: 16 MG/DL (ref 5–25)
CALCIUM SERPL-MCNC: 9.1 MG/DL (ref 8.4–10.2)
CHLORIDE SERPL-SCNC: 109 MMOL/L (ref 96–108)
CO2 SERPL-SCNC: 29 MMOL/L (ref 21–32)
CREAT SERPL-MCNC: 0.88 MG/DL (ref 0.6–1.3)
ERYTHROCYTE [DISTWIDTH] IN BLOOD BY AUTOMATED COUNT: 12.5 % (ref 11.6–15.1)
GFR SERPL CREATININE-BSD FRML MDRD: 90 ML/MIN/1.73SQ M
GLUCOSE P FAST SERPL-MCNC: 138 MG/DL (ref 65–99)
HCT VFR BLD AUTO: 41.7 % (ref 36.5–49.3)
HGB BLD-MCNC: 14 G/DL (ref 12–17)
MCH RBC QN AUTO: 31.6 PG (ref 26.8–34.3)
MCHC RBC AUTO-ENTMCNC: 33.6 G/DL (ref 31.4–37.4)
MCV RBC AUTO: 94 FL (ref 82–98)
PLATELET # BLD AUTO: 224 THOUSANDS/UL (ref 149–390)
PMV BLD AUTO: 9.7 FL (ref 8.9–12.7)
POTASSIUM SERPL-SCNC: 4.9 MMOL/L (ref 3.5–5.3)
PROT SERPL-MCNC: 6.8 G/DL (ref 6.4–8.4)
RBC # BLD AUTO: 4.43 MILLION/UL (ref 3.88–5.62)
SODIUM SERPL-SCNC: 142 MMOL/L (ref 135–147)
WBC # BLD AUTO: 7.56 THOUSAND/UL (ref 4.31–10.16)

## 2023-08-30 PROCEDURE — 36415 COLL VENOUS BLD VENIPUNCTURE: CPT

## 2023-08-30 PROCEDURE — 80053 COMPREHEN METABOLIC PANEL: CPT

## 2023-08-30 PROCEDURE — 85027 COMPLETE CBC AUTOMATED: CPT

## 2023-08-30 PROCEDURE — 99213 OFFICE O/P EST LOW 20 MIN: CPT | Performed by: PSYCHIATRY & NEUROLOGY

## 2023-08-30 PROCEDURE — 80183 DRUG SCRN QUANT OXCARBAZEPIN: CPT

## 2023-08-30 NOTE — ASSESSMENT & PLAN NOTE
Susana Conklin is a 27-year-old patient with obstructive sleep apnea this was noted in November 2022 he is currently utilizing CPAP but is unhappy with the whole process. He is at night terrors persist and they can be quite terrifying. He is continue to have restless legs symptoms in the evening. He has no problems falling asleep. We did discuss the use of melatonin which can help with the sleep cycle although he was reluctant to utilize this. He did have many questions regarding the sleep study. I will contact this sleep department so these questions could be answered.     He does have an appointment with sleep in several weeks

## 2023-08-30 NOTE — ASSESSMENT & PLAN NOTE
His seizures are well controlled. In the past his aura consisted of remy vu ,  nausea and he has had no auras. He is currently on Trileptal 900 twice a day. I did order laboratory studies that should be performed prior to his next appointment.

## 2023-08-30 NOTE — PROGRESS NOTES
Patient ID: Ameena Chanel is a 58 y.o. male. Assessment/Plan:    Nonintractable epilepsy with complex partial seizures ([address])      His seizures are well controlled. In the past his aura consisted of remy vu ,  nausea and he has had no auras. He is currently on Trileptal 900 twice a day. I did order laboratory studies that should be performed prior to his next appointment. DAWSON (obstructive sleep apnea)  DANIEL is a 76-year-old patient with obstructive sleep apnea this was noted in November 2022 he is currently utilizing CPAP but is unhappy with the whole process. He is at night terrors persist and they can be quite terrifying. He is continue to have restless legs symptoms in the evening. He has no problems falling asleep. We did discuss the use of melatonin which can help with the sleep cycle although he was reluctant to utilize this. He did have many questions regarding the sleep study. I will contact this sleep department so these questions could be answered. He does have an appointment with sleep in several weeks       Diagnoses and all orders for this visit:    Nonintractable epilepsy with complex partial seizures ([address])  -     Comprehensive metabolic panel; Future  -     CBC and Platelet; Future  -     Oxcarbazepine level; Future    DAWSON (obstructive sleep apnea)         Subjective:    Ameena Chanel is a 58  y.o. male w/ PMH focal onset impaired aware seizures currently adequately controlled on oxcarbazepine 900 mg bid . Denies any recurrent episodes and the staring off episodes have improved. Since his last visit he was seen by sleep and started utilizing a CPAP machine. He admits that he is unable to tolerate the CPAP machine. He continues to have night terrors usually during REM sleep about 3 or 4 in the morning. The night terrors have improved however they are now worsening. He reports kicking, flailing, and snoring moving which can be is disturbing to sleep.   He has no difficulty falling asleep but does have difficulty staying asleep. He is reluctant to initiate melatonin. He has many questions regarding his diagnosis and his treatment.    He did try gabapentin but this resulted in nausea.           .     He does complain of a tremor involving the right upper extremity but he has no Parkinson's like features. Did occur at rest but it was at of a lower amplitude. He denies any similar nocturnal type seizures.     He did not have any recent laboratories    Today he is accompanied by his wife Simi Douglass  He also be continued on a low-dose of duloxetine               The following portions of the patient's history were reviewed and updated as appropriate:   He  has a past medical history of Anxiety, Arthritis, Carpal tunnel syndrome, bilateral, Epilepsy ([address]), Exercises daily, Seizure disorder ([address]), and Wrist pain. He  has a past surgical history that includes Cataract extraction (Bilateral); Wrist surgery (Left); Colonoscopy (2010); EGD (08/2019); Colonoscopy (02/2021); Appendectomy; Foot surgery (Right); and pr neuroplasty &/transpos median nrv carpal tunne (Bilateral, 3/19/2021). His family history includes Breast cancer in his sister; Diabetes in his maternal aunt; No Known Problems in his father and mother. He  reports that he quit smoking about 4 years ago. His smoking use included cigarettes. He has a 90.00 pack-year smoking history. He has never used smokeless tobacco. He reports that he does not currently use alcohol. He reports that he does not use drugs.   Current Outpatient Medications   Medication Sig Dispense Refill   • DULoxetine (CYMBALTA) 60 mg delayed release capsule TAKE 1 CAPSULE DAILY 90 capsule 3   • OXcarbazepine (TRILEPTAL) 300 mg tablet TAKE 3 TABLETS IN THE MORNING AND 3 TABLETS AT NIGHT 540 tablet 3   • Ibuprofen 200 MG CAPS Take by mouth as needed (Patient not taking: Reported on 5/18/2023)     • LORazepam (ATIVAN) 0.5 mg tablet Take 1 tablet (0.5 mg total) by mouth every 8 (eight) hours as needed for seizures (Patient not taking: Reported on 5/18/2023) 10 tablet 0   • oxyCODONE (ROXICODONE) 5 mg immediate release tablet Take 1 tablet (5 mg total) by mouth every 6 (six) hours as needed for severe pain for up to 5 dosesMax Daily Amount: 20 mg (Patient not taking: No sig reported) 5 tablet 0     Current Facility-Administered Medications   Medication Dose Route Frequency Provider Last Rate Last Admin   • lidocaine-epinephrine (XYLOCAINE/EPINEPHRINE) 1 %-1:100,000 injection 30 mL  30 mL Infiltration Once Jamie Milton MD         He has No Known Allergies. .         Objective:    Blood pressure 147/69, pulse (!) 54, temperature 98.1 °F (36.7 °C), temperature source Tympanic, resp. rate 18, height 5' 8" (1.727 m), weight 87 kg (191 lb 12.8 oz), SpO2 98 %. Physical Exam  Eyes:      General: Lids are normal.      Extraocular Movements: Extraocular movements intact. Pupils: Pupils are equal, round, and reactive to light. Neurological:      Deep Tendon Reflexes:      Reflex Scores:       Tricep reflexes are 1+ on the right side and 1+ on the left side. Bicep reflexes are 2+ on the right side and 2+ on the left side. Patellar reflexes are 2+ on the right side and 2+ on the left side. Achilles reflexes are 1+ on the right side and 1+ on the left side. Neurological Exam  Mental Status  Awake, alert and oriented to person, place and time. Oriented to person, place and time. Language is fluent with no aphasia. Cranial Nerves  CN II: Visual acuity is normal. Visual fields full to confrontation. CN III, IV, VI: Extraocular movements intact bilaterally. Normal lids and orbits bilaterally. Pupils equal round and reactive to light bilaterally. CN V: Facial sensation is normal.  CN VII: Full and symmetric facial movement. CN VIII: Hearing is normal.  CN IX, X: Palate elevates symmetrically. Normal gag reflex.   CN XI: Shoulder shrug strength is normal.  CN XII: Tongue midline without atrophy or fasciculations. Motor  Normal muscle bulk throughout. No fasciculations present. Sensory  Light touch is normal in upper and lower extremities. Reflexes                                            Right                      Left  Biceps                                 2+                         2+  Triceps                                1+                         1+  Patellar                                2+                         2+  Achilles                                1+                         1+  Right Plantar: downgoing  Left Plantar: downgoing    Coordination  Right: Finger-to-nose normal.Left: Finger-to-nose normal.    Gait  Normal casual, toe, heel and tandem gait. Review of systems obtained from the medical assistant as below was reviewed with the patient at today's visit  ROS:    Review of Systems   Constitutional: Negative for appetite change, fatigue and fever. HENT: Negative. Negative for hearing loss, tinnitus, trouble swallowing and voice change. Eyes: Negative. Negative for photophobia, pain and visual disturbance. Respiratory: Negative. Negative for shortness of breath. Cardiovascular: Negative. Negative for palpitations. Gastrointestinal: Negative. Negative for nausea and vomiting. Endocrine: Negative. Negative for cold intolerance. Genitourinary: Negative. Negative for dysuria, frequency and urgency. Musculoskeletal: Negative for back pain, gait problem, myalgias and neck pain. Body is sore from working   Skin: Negative. Negative for rash. Allergic/Immunologic: Negative. Neurological: Positive for tremors (haqnds). Negative for dizziness, seizures, syncope, facial asymmetry, speech difficulty, weakness, light-headedness, numbness and headaches. Hematological: Negative. Does not bruise/bleed easily. Psychiatric/Behavioral: Positive for sleep disturbance.  Negative for confusion and hallucinations.

## 2023-08-31 ENCOUNTER — TELEPHONE (OUTPATIENT)
Dept: SLEEP CENTER | Facility: CLINIC | Age: 63
End: 2023-08-31

## 2023-08-31 NOTE — TELEPHONE ENCOUNTER
----- Message from Hilda Baca MD sent at 8/30/2023  8:58 PM EDT -----  Thanks for reaching out, Yes I would be happy to see him. Morena Ro, can you find a way to make this happen? Could also be a good patient for Balaji Shepard to see if I do not have any openings (though I am happy to try to find one)  ----- Message -----  From: Alaina Kiser DO  Sent: 8/30/2023   4:41 PM EDT  To: Hilda Baca MD    Beauty Craw     This is a patient I saw today in the office. He has partial seizures and is currently on Trileptal and doing well. The majority of his complaints are sleep related. He seems to have of REM sleep disorder with nightmares but has a difficult time with CPAP. He does feel that his questions about his sleeping habits and sleep study  have not been adequately addressed. He is no longer utilizing CPAP on a regular basis and did not want to initiate melatonin. He informed that he felt uncomfortable with the PA who did see him in May and did request an evaluation by a physician provider.        I was wondering if you could arrange an evaluation by a physician who could address some of these concerns       Thanks     Kiara

## 2023-09-01 LAB — OXCARBAZEPINE SERPL-MCNC: 25 UG/ML (ref 10–35)

## 2023-09-18 ENCOUNTER — OFFICE VISIT (OUTPATIENT)
Dept: SLEEP CENTER | Facility: CLINIC | Age: 63
End: 2023-09-18
Payer: COMMERCIAL

## 2023-09-18 VITALS
SYSTOLIC BLOOD PRESSURE: 120 MMHG | HEIGHT: 68 IN | BODY MASS INDEX: 28.64 KG/M2 | WEIGHT: 189 LBS | DIASTOLIC BLOOD PRESSURE: 75 MMHG

## 2023-09-18 DIAGNOSIS — G47.33 OSA (OBSTRUCTIVE SLEEP APNEA): Primary | ICD-10-CM

## 2023-09-18 DIAGNOSIS — R06.83 SNORING: ICD-10-CM

## 2023-09-18 DIAGNOSIS — G47.52 REM SLEEP BEHAVIOR DISORDER: ICD-10-CM

## 2023-09-18 DIAGNOSIS — G47.61 PLMD (PERIODIC LIMB MOVEMENT DISORDER): ICD-10-CM

## 2023-09-18 PROCEDURE — 99214 OFFICE O/P EST MOD 30 MIN: CPT | Performed by: INTERNAL MEDICINE

## 2023-09-18 NOTE — PROGRESS NOTES
Progress Note - Sleep Medicine  Nelsy Jolly 58 y.o. male MRN: [id number]       Impression & Plan:       1. REM sleep behavior disorder  Sleep study did not show evidence of this  PLM index 23.6. Patient takes 60 mg of Cymbalta which she started 4 years ago    Plan  Recommended patient take melatonin 3 mg, recommended he increase to 6 mg if there is no improvement after a week  Follow-up in 1 month  I will speak with neurology regarding concern for Cymbalta leading to nocturnal movements and worsening RBD  Counseled patient on precautions and bed alarms  Can increase melatonin dose up to 16 mg  If no improvement with melatonin can switch to or add clonazepam    2. Moderate obstructive sleep apnea  Diagnostic PSG 11/21/2022 AHI 16.9    Plan  Patient is returning CPAP as there is a safety concern with his nocturnal movements  I will provide him with a card for sleep dentist Dr Yoel Holguin at next visit in 1 month    3. Periodic limb movement disorder  PLM index 23.6  Normal iron levels  Can consider gabapentin in the future    Patient instructions: The danger of acting out of dreams is injury to yourself or other. Strategies to help prevent injury  -If you are prone to rolling/moving/falling out of bed- bedrails. Lowering the mattress or sleeping with the mattress on the floor  -Move furniture away from the bed that you could flail and hit. If there is furniture nearby, corners should be covered with foam cushioning (often available with child safety products)  -Make sure the floor is free of obstacles  -Ideally have a rug or carpet    ______________________________________________________________________    HPI:    Nelsy Jolly is a 58-year-old male with a past medical history of epilepsy who presents for follow-up. He was diagnosed with moderate DAWSON. He has been unable to tolerate CPAP as he has frequent dream enactment behavior and nocturnal movements.   He states that he has woken up with the hose tied around his neck. He has been having frequent nighttime movements. He hit his head against the wall and put a hole in the wall. He frequently gets out of bed and screams. Nocturnal movements occur around 3-4 AM.  He states he has vivid dreams. He has fallen out of bed on many occasions. Currently the mattress is on the floor. He started taking 1 mg of melatonin as recommended by his neurologist.  He states it has not helped. He stopped using CPAP 2 weeks ago due to safety concerns. Dream enactment behavior has been present his whole life but has worsened in the last 4 years. 4 years ago he was diagnosed with epilepsy. His seizures have been under control with Trileptal and Cymbalta. He has excessive daytime sleepiness with an [place] score of 16. No family history of neurological disorders such as Parkinson's. Review of Systems:  Review of Systems   All other systems reviewed and are negative.         Social history updates:  Social History     Tobacco Use   Smoking Status Former   • Packs/day: 2.00   • Years: 45.00   • Total pack years: 90.00   • Types: Cigarettes   • Quit date: 2019   • Years since quittin.1   Smokeless Tobacco Never     Social History     Socioeconomic History   • Marital status: /Civil Union     Spouse name: Not on file   • Number of children: Not on file   • Years of education: Not on file   • Highest education level: Not on file   Occupational History   • Not on file   Tobacco Use   • Smoking status: Former     Packs/day: 2.00     Years: 45.00     Total pack years: 90.00     Types: Cigarettes     Quit date: 2019     Years since quittin.1   • Smokeless tobacco: Never   Vaping Use   • Vaping Use: Never used   Substance and Sexual Activity   • Alcohol use: Not Currently     Comment: quit 3/2019   • Drug use: No   • Sexual activity: Not on file   Other Topics Concern   • Not on file   Social History Narrative   • Not on file     Social Determinants of Health     Financial Resource Strain: Not on file   Food Insecurity: Not on file   Transportation Needs: Not on file   Physical Activity: Not on file   Stress: Not on file   Social Connections: Not on file   Intimate Partner Violence: Not on file   Housing Stability: Not on file       PhysicalExamination:  Vitals:   /75 (BP Location: Left arm, Patient Position: Sitting, Cuff Size: Large)   Ht 5' 8" (1.727 m)   Wt 85.7 kg (189 lb)   BMI 28.74 kg/m²     Physical Exam  General:  Awake alert and oriented x 3, conversant without conversational dyspnea, NAD, normal affect  HEENT:  PERRL, Sclera noninjected, nonicteric OU, Nares patent,  no craniofacial abnormalities, Mucous membranes, moist, no oral lesions, normal dentition  NECK:  Trachea midline, no accessory muscle use, no stridor, no cervical or supraclavicular adenopathy, JVP not elevated  CARDIAC: Reg, single s1/S2, no m/r/g  PULM: CTA bilaterally no wheezing, rhonchi or rales  EXT: No cyanosis, no clubbing, no edema, normal capillary refill  NEURO: no focal neurologic deficits, AAOx3, moving all extremities appropriately     Diagnostic Data:  Labs:   I personally reviewed the most recent laboratory data pertinent to today's visit  Appointment on 08/30/2023   Component Date Value   • Sodium 08/30/2023 142    • Potassium 08/30/2023 4.9    • Chloride 08/30/2023 109 (H)    • CO2 08/30/2023 29    • ANION GAP 08/30/2023 4    • BUN 08/30/2023 16    • Creatinine 08/30/2023 0.88    • Glucose, Fasting 08/30/2023 138 (H)    • Calcium 08/30/2023 9.1    • AST 08/30/2023 12 (L)    • ALT 08/30/2023 14    • Alkaline Phosphatase 08/30/2023 53    • Total Protein 08/30/2023 6.8    • Albumin 08/30/2023 4.3    • Total Bilirubin 08/30/2023 0.30    • eGFR 08/30/2023 90    • WBC 08/30/2023 7.56    • RBC 08/30/2023 4.43    • Hemoglobin 08/30/2023 14.0    • Hematocrit 08/30/2023 41.7    • MCV 08/30/2023 94    • MCH 08/30/2023 31.6    • MCHC 08/30/2023 33.6    • RDW 08/30/2023 12.5 • Platelets 27/27/0592 224    • MPV 08/30/2023 9.7    • Oxcarbazepine 08/30/2023 25        I have personally reviewed pertinent lab results.   Lab Results   Component Value Date    WBC 7.56 08/30/2023    HGB 14.0 08/30/2023    HCT 41.7 08/30/2023    MCV 94 08/30/2023     08/30/2023     Lab Results   Component Value Date    CALCIUM 9.1 08/30/2023    K 4.9 08/30/2023    CO2 29 08/30/2023     (H) 08/30/2023    BUN 16 08/30/2023    CREATININE 0.88 08/30/2023     No results found for: "IGE"  Lab Results   Component Value Date    ALT 14 08/30/2023    AST 12 (L) 08/30/2023    ALKPHOS 53 08/30/2023     Lab Results   Component Value Date    FERRITIN 217 08/27/2019     Lab Results   Component Value Date    PTYDTUBT26 013 08/27/2019     No results found for: "FOLATE"      Arterial Blood Gas result:  NA    Sleep studies:  Diagnostic PSG 11/21/2022 AHI 16.9    Compliance Data:  Type of CPAP: Air sense 11 auto CPAP 5-15 data from 8/14 - 9/12                                   Percent usage: More than 4 hours 40%                                   Average time used: 5 hours and 16 minutes                                  Time in large leak: Median 8.5                                   Residual AHI: 7.2                                         Dean Baker MD  [facility]

## 2023-09-19 ENCOUNTER — TELEPHONE (OUTPATIENT)
Dept: SLEEP CENTER | Facility: CLINIC | Age: 63
End: 2023-09-19

## 2023-09-19 LAB
DME PARACHUTE DELIVERY DATE REQUESTED: NORMAL
DME PARACHUTE ITEM DESCRIPTION: NORMAL
DME PARACHUTE ORDER STATUS: NORMAL
DME PARACHUTE SUPPLIER NAME: NORMAL
DME PARACHUTE SUPPLIER PHONE: NORMAL

## 2024-01-02 ENCOUNTER — OFFICE VISIT (OUTPATIENT)
Dept: FAMILY MEDICINE CLINIC | Facility: CLINIC | Age: 64
End: 2024-01-02
Payer: COMMERCIAL

## 2024-01-02 VITALS
WEIGHT: 193.8 LBS | TEMPERATURE: 98.7 F | SYSTOLIC BLOOD PRESSURE: 140 MMHG | DIASTOLIC BLOOD PRESSURE: 68 MMHG | HEART RATE: 66 BPM | BODY MASS INDEX: 28.71 KG/M2 | HEIGHT: 69 IN | OXYGEN SATURATION: 96 %

## 2024-01-02 DIAGNOSIS — Z12.5 SCREENING FOR PROSTATE CANCER: ICD-10-CM

## 2024-01-02 DIAGNOSIS — Z13.0 SCREENING FOR IRON DEFICIENCY ANEMIA: ICD-10-CM

## 2024-01-02 DIAGNOSIS — Z13.29 SCREENING FOR THYROID DISORDER: ICD-10-CM

## 2024-01-02 DIAGNOSIS — Z13.220 SCREENING FOR CHOLESTEROL LEVEL: ICD-10-CM

## 2024-01-02 DIAGNOSIS — Z23 ENCOUNTER FOR IMMUNIZATION: Primary | ICD-10-CM

## 2024-01-02 DIAGNOSIS — Z13.1 SCREENING FOR DIABETES MELLITUS: ICD-10-CM

## 2024-01-02 DIAGNOSIS — Z00.00 ANNUAL PHYSICAL EXAM: ICD-10-CM

## 2024-01-02 DIAGNOSIS — G40.209 NONINTRACTABLE EPILEPSY WITH COMPLEX PARTIAL SEIZURES (HCC): ICD-10-CM

## 2024-01-02 PROCEDURE — 90472 IMMUNIZATION ADMIN EACH ADD: CPT

## 2024-01-02 PROCEDURE — 99396 PREV VISIT EST AGE 40-64: CPT | Performed by: FAMILY MEDICINE

## 2024-01-02 PROCEDURE — 90471 IMMUNIZATION ADMIN: CPT

## 2024-01-02 PROCEDURE — 90715 TDAP VACCINE 7 YRS/> IM: CPT

## 2024-01-02 PROCEDURE — 90686 IIV4 VACC NO PRSV 0.5 ML IM: CPT

## 2024-01-02 NOTE — PROGRESS NOTES
ADULT ANNUAL PHYSICAL  [facility]    NAME: Olman Merlos  AGE: 63 y.o. SEX: male  : 1960     DATE: 2024     Assessment and Plan:     Problem List Items Addressed This Visit          Nervous and Auditory    Nonintractable epilepsy with complex partial seizures (HCC)     Other Visit Diagnoses       Encounter for immunization    -  Primary    Relevant Orders    influenza vaccine, high-dose, PF 0.7 mL (FLUZONE HIGH-DOSE)    TDAP VACCINE GREATER THAN OR EQUAL TO 8YO IM    Annual physical exam        Relevant Orders    Ambulatory Referral to Audiology    BMI 29.0-29.9,adult        Screening for iron deficiency anemia        Relevant Orders    CBC    Screening for diabetes mellitus        Relevant Orders    Comprehensive metabolic panel    Hemoglobin A1C    Screening for cholesterol level        Relevant Orders    Lipid Panel with Direct LDL reflex    Screening for thyroid disorder        Relevant Orders    TSH, 3rd generation with Free T4 reflex    Screening for prostate cancer        Relevant Orders    PSA Total, Diagnostic            Immunizations and preventive care screenings were discussed with patient today. Appropriate education was printed on patient's after visit summary.    Discussed risks and benefits of prostate cancer screening. We discussed the controversial history of PSA screening for prostate cancer in the [place] as well as the risk of over detection and over treatment of prostate cancer by way of PSA screening.  The patient understands that PSA blood testing is an imperfect way to screen for prostate cancer and that elevated PSA levels in the blood may also be caused by infection, inflammation, prostatic trauma or manipulation, urological procedures, or by benign prostatic enlargement.    The role of the digital rectal examination in prostate cancer screening was also discussed and I discussed with him that there is large  interobserver variability in the findings of digital rectal examination.    Counseling:  Alcohol/drug use: discussed moderation in alcohol intake, the recommendations for healthy alcohol use, and avoidance of illicit drug use.  Dental Health: discussed importance of regular tooth brushing, flossing, and dental visits.  Injury prevention: discussed safety/seat belts, safety helmets, smoke detectors, carbon dioxide detectors, and smoking near bedding or upholstery.  Sexual health: discussed sexually transmitted diseases, partner selection, use of condoms, avoidance of unintended pregnancy, and contraceptive alternatives.  Exercise: the importance of regular exercise/physical activity was discussed. Recommend exercise 3-5 times per week for at least 30 minutes.          Return in 1 year (on 1/2/2025).     Chief Complaint:     Chief Complaint   Patient presents with    Physical Exam      History of Present Illness:     Adult Annual Physical   Patient here for a comprehensive physical exam. The patient reports no problems.    Diet and Physical Activity  Diet/Nutrition: well balanced diet.   Exercise: 3-4 times a week on average.      Depression Screening  PHQ-2/9 Depression Screening    Little interest or pleasure in doing things: 0 - not at all  Feeling down, depressed, or hopeless: 0 - not at all  PHQ-2 Score: 0  PHQ-2 Interpretation: Negative depression screen       General Health  Sleep:  sleep disorder present .   Hearing: normal - bilateral.  Vision: goes for regular eye exams.   Dental: regular dental visits.        Health  Symptoms include: none       Review of Systems:     Review of Systems   Constitutional:  Negative for activity change, chills, fatigue and fever.   HENT:  Negative for congestion, ear pain, sinus pressure and sore throat.    Eyes:  Negative for redness, itching and visual disturbance.   Respiratory:  Negative for cough and shortness of breath.    Cardiovascular:  Negative for chest pain and  palpitations.   Gastrointestinal:  Negative for abdominal pain, diarrhea and nausea.   Endocrine: Negative for cold intolerance and heat intolerance.   Genitourinary:  Negative for dysuria, flank pain and frequency.   Musculoskeletal:  Negative for arthralgias, back pain, gait problem and myalgias.   Skin:  Negative for color change.   Allergic/Immunologic: Negative for environmental allergies.   Neurological:  Negative for dizziness, numbness and headaches.   Psychiatric/Behavioral:  Negative for behavioral problems and sleep disturbance.       Past Medical History:     Past Medical History:   Diagnosis Date    Anxiety     Arthritis     RH    Carpal tunnel syndrome, bilateral     correction both today 3/19/2021    Epilepsy (HCC)     last seizure approx last March 2020/was off meds for awhile//but resumed taking them and no problems since    Exercises daily     also walks alot at work    Seizure disorder (HCC)     Wrist pain     bilat occas      Past Surgical History:     Past Surgical History:   Procedure Laterality Date    APPENDECTOMY      age8    CATARACT EXTRACTION Bilateral     COLONOSCOPY  2010    normal    COLONOSCOPY  02/2021    EGD  08/2019    H. pylori gastritis, treated with ampicillin, clarithromycin and omeprazole    FOOT SURGERY Right     pin and 3 screws implanted    AL NEUROPLASTY &/TRANSPOS MEDIAN NRV CARPAL TUNNE Bilateral 3/19/2021    Procedure: Carpal tunnel release;  Surgeon: Jarad Briones MD;  Location: [facility];  Service: Orthopedics    WRIST SURGERY Left       Family History:     Family History   Problem Relation Age of Onset    No Known Problems Mother     No Known Problems Father     Diabetes Maternal Aunt     Breast cancer Sister     Colon cancer Neg Hx       Social History:     Social History     Socioeconomic History    Marital status: /Civil Union     Spouse name: None    Number of children: None    Years of education: None    Highest education level: None   Occupational  "History    None   Tobacco Use    Smoking status: Former     Current packs/day: 0.00     Average packs/day: 2.0 packs/day for 45.0 years (90.0 ttl pk-yrs)     Types: Cigarettes     Start date: 1974     Quit date: 2019     Years since quittin.4    Smokeless tobacco: Never   Vaping Use    Vaping status: Never Used   Substance and Sexual Activity    Alcohol use: Not Currently     Comment: quit 3/2019    Drug use: No    Sexual activity: None   Other Topics Concern    None   Social History Narrative    None     Social Determinants of Health     Financial Resource Strain: Not on file   Food Insecurity: Not on file   Transportation Needs: Not on file   Physical Activity: Not on file   Stress: Not on file   Social Connections: Not on file   Intimate Partner Violence: Not on file   Housing Stability: Not on file      Current Medications:     Current Outpatient Medications   Medication Sig Dispense Refill    DULoxetine (CYMBALTA) 60 mg delayed release capsule TAKE 1 CAPSULE DAILY 90 capsule 3    OXcarbazepine (TRILEPTAL) 300 mg tablet TAKE 3 TABLETS IN THE MORNING AND 3 TABLETS AT NIGHT 540 tablet 3    Ibuprofen 200 MG CAPS Take by mouth as needed       Current Facility-Administered Medications   Medication Dose Route Frequency Provider Last Rate Last Admin    lidocaine-epinephrine (XYLOCAINE/EPINEPHRINE) 1 %-1:100,000 injection 30 mL  30 mL Infiltration Once Jarad Briones MD          Allergies:     No Known Allergies   Physical Exam:     /68 (BP Location: Right arm, Patient Position: Sitting, Cuff Size: Large)   Pulse 66   Temp 98.7 °F (37.1 °C)   Ht 5' 8.5\" (1.74 m)   Wt 87.9 kg (193 lb 12.8 oz)   SpO2 96%   BMI 29.04 kg/m²     Physical Exam  Vitals reviewed.   Constitutional:       General: He is not in acute distress.     Appearance: He is well-developed. He is not diaphoretic.   HENT:      Head: Normocephalic and atraumatic. No right periorbital erythema or left periorbital erythema.      Right " Ear: Tympanic membrane normal. No decreased hearing noted.      Left Ear: Tympanic membrane normal. No decreased hearing noted.      Nose: Nose normal.      Right Sinus: No maxillary sinus tenderness or frontal sinus tenderness.      Left Sinus: No maxillary sinus tenderness or frontal sinus tenderness.      Mouth/Throat:      Lips: Pink.      Mouth: Mucous membranes are moist.      Pharynx: No oropharyngeal exudate.      Tonsils: No tonsillar exudate or tonsillar abscesses.   Eyes:      General: Lids are normal.      Extraocular Movements: Extraocular movements intact.      Conjunctiva/sclera: Conjunctivae normal.      Pupils: Pupils are equal, round, and reactive to light.   Neck:      Thyroid: No thyroid mass.      Trachea: Trachea normal.   Cardiovascular:      Rate and Rhythm: Normal rate and regular rhythm.      Pulses: Normal pulses.      Heart sounds: Normal heart sounds. No murmur heard.     No friction rub. No gallop.   Pulmonary:      Effort: Pulmonary effort is normal. No respiratory distress.      Breath sounds: Normal breath sounds. No wheezing or rales.   Abdominal:      General: Bowel sounds are normal. There is no distension.      Palpations: Abdomen is soft. There is no mass.      Tenderness: There is no abdominal tenderness. There is no guarding.   Musculoskeletal:         General: Normal range of motion.      Cervical back: Normal range of motion and neck supple.   Skin:     General: Skin is warm and dry.      Coloration: Skin is not pale.      Findings: No erythema or rash.   Neurological:      Mental Status: He is alert and oriented to person, place, and time.      Cranial Nerves: No cranial nerve deficit.      Coordination: Coordination normal.   Psychiatric:         Attention and Perception: Attention and perception normal.         Mood and Affect: Mood and affect normal.         Speech: Speech normal.         Behavior: Behavior normal.         Thought Content: Thought content normal.          Cognition and Memory: Cognition and memory normal.         Judgment: Judgment normal.          Ihab DO Koby  [facility]

## 2024-01-13 LAB — HBA1C MFR BLD HPLC: 6.3 %

## 2024-02-07 ENCOUNTER — OFFICE VISIT (OUTPATIENT)
Dept: SLEEP CENTER | Facility: CLINIC | Age: 64
End: 2024-02-07
Payer: COMMERCIAL

## 2024-02-07 VITALS
BODY MASS INDEX: 29.1 KG/M2 | WEIGHT: 192 LBS | DIASTOLIC BLOOD PRESSURE: 79 MMHG | HEIGHT: 68 IN | SYSTOLIC BLOOD PRESSURE: 122 MMHG

## 2024-02-07 DIAGNOSIS — G47.33 OSA (OBSTRUCTIVE SLEEP APNEA): ICD-10-CM

## 2024-02-07 DIAGNOSIS — G47.52 DREAM ENACTMENT BEHAVIOR: Primary | ICD-10-CM

## 2024-02-07 PROCEDURE — 99214 OFFICE O/P EST MOD 30 MIN: CPT | Performed by: INTERNAL MEDICINE

## 2024-02-07 RX ORDER — MELATONIN 3 MG
3 TABLET ORAL
COMMUNITY

## 2024-02-07 NOTE — LETTER
February 8, 2024     Olman Merlos    Patient: Olman Merlos   YOB: 1960   Date of Visit: 2/7/2024       Dear Dr. Merlos:    Thank you for referring Olman Merlos to me for evaluation. Below are my notes for this consultation.    If you have questions, please do not hesitate to call me. I look forward to following your patient along with you.         Sincerely,        Toby Serrano DO        CC: DO Raquel Duenas MD  2/7/2024  3:03 PM  Attested  Progress Note - Sleep Medicine  Olman Merlos 63 y.o. male MRN: [id number]       Impression & Plan:   Patient is a 63 y.o. male with PMH including epilepsy (controlled with Trileptal and cymbalta), cataract, cubital tunnel syndrome, carbital tunnel syndrome, balance disorder who presents for follow up of moderate obstructive sleep apnea and abnormal behaviors during sleep.    Discussed with patient that differential includes REM behavior disorder (although not identified on most recent extended RBD PSG on 11/22/2022), PSTD with associated nightmares, NREM parasomnias (including confusional arousals, sleep terrors, nightmares) ,nocutrnal frontal lobe episley, as well as sleep fragmenting untreated obstructive sleep apnea.     It was recommended at this time to increase the dose of Melatonin from 3mg to 6mg qhs to help reduce frequency of his abnormal behaviors further. He is unable to start potentially sedating medication such as Clonazepam at this time, as he states he is on call for work 24/7 and frequently needs to go into work during the night. He plans on retiring in September 2024. Advised that if frequency or severity of abnormal behaviors at night worsen in the interim, will need to discuss alternative treatment options. Pramipexole has been reported to effectively treat RBD when there are frequent PLMs during NREM sleep. Other agents with some reported success include imipramine, carbamazepine, sodium oxybate,  triazolam, quetiapine, and clozapine. Additionally, I recommend he discuss Duloxetine with his Neurologist, as this  SNRIs are associated with RBD.    It was discussed that it is important to keep bedroom free from obstacles and objects that that may cause injury, making sure doors and windows are locked. We discussed that if these are parasomnias, gentle direction is needed and an individual should not be touched or accosted. Advised that if he has firearms in the home, we discussed gun safety including keeping the firearm unloaded in a safe with a complicated lock.    He wishes to leave DAWSON untreated at this time. I explained the importance of treatment, and the consequences of untreated obstructive sleep apnea on underlying cardiovascular and cerebrovascular risk, morbidity, and mortality. Patient accepts risks associated with untreated DAWSON. He does not believe that he has DAWSON as a diagnosis. I offered to repeat a sleep study to evaluate again, however he declines.      Patient to follow up in office in approximately 3 months. Patient instructed to call office or send Bitnami message with any questions or concerns in the interim.     1. Dream enactment behavior  -Increase Melatonin from 3mg to 6mg qhs    2. DAWSON (obstructive sleep apnea)     ________________________________________________________________    HPI:    Olman Merlos is a 63 y.o. male with PMH including epilepsy (controlled with Trileptal and cymbalta), cataract, cubital tunnel syndrome, carpal tunnel syndrome, balance disorder. She presents today for follow up of moderate obstructive sleep apnea and REM behavior disorder.      Has history of moderate DAWSON, initially diagnosed 11/2022 on an extended RBD montage diagnostic PSG, demonstrating AHI 16.9 (worse in supine position AHI 77.6). States he used CPAP consistently for at least one year, however he ultimately could not tolerate PAP therapy due to nocturnal RBD behaviors including yelling/screaming,  and taking CPAP off at night. Did not feel more rested when he used CPAP consistently in the past.     He reports he has had a 3 year history of experiencing dream enactment behavior. Has very vivid dreams, usually being chased, or combat (with aliens or medieval times characters). He no longer sleeps in the same bed as his wife due to snoring, but as per his wife, he screams/yells, and fights frequently while sleeping. Sometimes he wakes up on the floor. He has had 2 serious falls out of bed (did not seek medical attention afterwards). One time, hit his head through his wall - this occurred while sleeping. One time hit his head on a nightstand while sleeping. These episodes occur intermittently, and have lessened in frequency since starting Melatonin 3mg since last office visit.  Sometimes he goes months without an episode. As Melatonin 3mg seems to be effective, he has not tried increasing the dose.     On most recent sleep study, PLM index was 23.6. He denies restless sleep, or symptoms suggestive of RLS. Denies frequent nocturnal awakenings.     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 Work hours are 7:00AM to 3:30PM but he is on call 24/7 and is often called into work overnight. He plans on retiring in September.     Sleep schedule: Patient goes to bed at 8:30AM, sleep latency is 5 minutes. Denies noturnal awakenings. Wakes up spontaneously at 4:15AM. He sometimes sleep until 8:30AM. He sometimes unintentionally dozes and nods off while at work. He takes a short nap 2-3 days a week.     Denies anosmia, constipation. Admits to fine tremor RUE when he overuses his arms frequently at work. Denies resting tremor or intentional tremor. Denies changes in memory, concentration, mood or gait.    Patient denies symptoms of narcolepsy, cataplexy, sleep paralysis, hypnopompic or hypnagogic hallucinations.    [place]: 13/24  Sitting and reading: Moderate chance of dozing  Watching TV: Moderate chance of dozing  Sitting,  inactive in a public place (e.g. a theatre or a meeting): Moderate chance of dozing  As a passenger in a car for an hour without a break: Moderate chance of dozing  Lying down to rest in the afternoon when circumstances permit: Moderate chance of dozing  Sitting and talking to someone: Slight chance of dozing  Sitting quietly after a lunch without alcohol: Moderate chance of dozing  In a car, while stopped for a few minutes in traffic: Would never doze  Total score: 13      Social history updates:  Social History     Tobacco Use   Smoking Status Former   • Current packs/day: 0.00   • Average packs/day: 2.0 packs/day for 45.0 years (90.0 ttl pk-yrs)   • Types: Cigarettes   • Start date: 1974   • Quit date: 2019   • Years since quittin.5   Smokeless Tobacco Never     Social History     Socioeconomic History   • Marital status: /Civil Union     Spouse name: Not on file   • Number of children: Not on file   • Years of education: Not on file   • Highest education level: Not on file   Occupational History   • Not on file   Tobacco Use   • Smoking status: Former     Current packs/day: 0.00     Average packs/day: 2.0 packs/day for 45.0 years (90.0 ttl pk-yrs)     Types: Cigarettes     Start date: 1974     Quit date: 2019     Years since quittin.5   • Smokeless tobacco: Never   Vaping Use   • Vaping status: Never Used   Substance and Sexual Activity   • Alcohol use: Not Currently     Comment: quit 3/2019   • Drug use: No   • Sexual activity: Not on file   Other Topics Concern   • Not on file   Social History Narrative   • Not on file     Social Determinants of Health     Financial Resource Strain: Not on file   Food Insecurity: Not on file   Transportation Needs: Not on file   Physical Activity: Not on file   Stress: Not on file   Social Connections: Not on file   Intimate Partner Violence: Not on file   Housing Stability: Not on file       PhysicalExamination:  Vitals:   /79 (BP  "Location: Left arm, Patient Position: Sitting, Cuff Size: Large)   Ht 5' 8\" (1.727 m)   Wt 87.1 kg (192 lb)   BMI 29.19 kg/m²     Physical Exam:  General: Sitting in chair, awake alert and oriented to person, place, and time. No acute distress  HEENT: PERRL, nares patent, no craniofacial abnormalities. Mucous membranes, moist, no oral lesions, and normal dentition. Mallampati class IV, tonsils 1+  NECK:  Trachea midline, no accessory muscle use, and no stridor   CARDIAC: Regular rate and rhythm, no murmur   PULM: CTA bilaterally no wheezing, rhonchi or rales. No conversational dyspnea  EXT: No cyanosis, no clubbing, and no peripheral edema    NEURO: No focal neurologic deficits, moving all extremities appropriately    Diagnostic Data:  Labs:  I personally reviewed the most recent laboratory data pertinent to today's visit    Lab Results   Component Value Date    WBC 7.56 08/30/2023    HGB 14.0 08/30/2023    HCT 41.7 08/30/2023    MCV 94 08/30/2023     08/30/2023     Lab Results   Component Value Date    CALCIUM 9.2 01/13/2024    K 4.5 01/13/2024    CO2 26 01/13/2024     01/13/2024    BUN 18 01/13/2024    CREATININE 0.88 01/13/2024     No results found for: \"IGE\"  Lab Results   Component Value Date    ALT 14 01/13/2024    AST 12 01/13/2024    ALKPHOS 47 01/13/2024     Lab Results   Component Value Date    FERRITIN 217 08/27/2019     Lab Results   Component Value Date    ALJINNYO38 543 08/27/2019     No results found for: \"FOLATE\"      Arterial Blood Gas result:  N/A     Sleep studies:  RBD Montage Diagnostic PSG 11/22/2022:  IMPRESSION:   Moderate obstructive sleep apnea. Respiratory events were worse in the supine position.   Normal baseline oxygen saturation.  Mild to moderate desaturations present with respiratory events.  Sleep efficiency was high. Stage N1 sleep (light sleep) was normal.. Stage N3 sleep (deep sleep) was normal.  REM sleep percentage was normal. REM sleep latency was normal. "   Increased periodic limb movements during sleep. These did not contribute to significant sleep disruption and cortical arousals.   EKG showed normal sinus rhythm.   A REM sleep disorder montage was used.  No evidence of REM sleep behavior disorder noted.  In addition, a full EEG was used.  Rare left remporal sharp waves were seen. abnormal behaviors were not present.     RECOMMENDATION:  Treatment is recommended for obstructive sleep apnea, auto-CPAP at the range of 5-15 cm h20 is recommended.   A single test can not completely rule out REM sleep behavior disorder so if symptoms persist, especially with treatment of obstructive sleep apnea, repeat sleep testing can be considered.  If nocturnal seizures remain a concern, consideration could be made for ambulatory EEG or inpatient epilepsy monitoring for further assessment.      Compliance Data:  N/A (CPAP device was returned)      Raquel Barnes   [facility] Sleep Medicine Fellow    Attestation signed by Toby Serrano DO at 2/8/2024 10:43 AM:  Attending Attestation   I have personally seen and examined.  I discussed the patient with the  fellow including, but not limited to, verifying findings; reviewing labs and x-rays;developing the plan of care with patient.  I have reviewed the note and assessment performed by the fellow and agree with the fellow's documented findings and plan of care with the following additions. Please see my following comments for details and adjustments.     Assessment/Plan  63 y.o. M with PMHx of  epilepsy (controlled with Trileptal and cymbalta), cataract, cubital tunnel syndrome, carbital tunnel syndrome, balance disorder who comes in for follow up of dream enactment disorder and DAWSON  1.  Dream enactment behavior - difficult to distinguish between RBD and PTSD associate nightmares, sleep terror.        -  We had a long discussion about safety measures.  He has lowered his bed but it may be appropriate to get bed rails as well.   "      -  We discussed his medications and how these may be contributing to his interruptions.  He may benefit from a switch away from duloxetine      -   We will increase his melatonin from 3 mg to 6 mg to see if there is any additional benefit as he noted some at 3.      -  He will likely need a repeat RBD montage study in the future to formally assess this dream enactment      -  I know that there was safety concern with his PAP tubing but not treating his sleep apnea in this situation is likely worsening the parasomnia as well.  We discussed treatment options as below.      2.  Moderate DAWSON - he is not currently on therapy due to concern of the tubing wrapping around his neck as well as him ripping the device off through the night.        -  We discussed trial of BIPAP to see if there is better tolerance      -  We also discussed Inspire as an option as well.  He will think about these options      -  There are alternative surgical options such as MMA as well but this would be a significant challenge as well    3.  Possible PTSD - it may be that he would benefit form a medication to help with this such as Prazosin.  Will consider further discussions about this in the future.      Mr. Merlos returns to the office to discuss his sleep.  He still struggles with frequent awakenings through the night.  He was trialed on some melatonin and this has seemed to help his sleep.  He still has episodes of acting out his dreams.  He still notes significant sleepiness during the day.  He has not been using his CPAP as he would take it off in the middle of the night and also would get the tubing wrapped around his head.  He is hesitant to go back to that again.    /79 (BP Location: Left arm, Patient Position: Sitting, Cuff Size: Large)   Ht 5' 8\" (1.727 m)   Wt 87.1 kg (192 lb)   BMI 29.19 kg/m²   General: Pleasant, Awake alert and oriented x 3, conversant without conversational dyspnea, NAD, normal affect  HEENT:  " PERRL, Sclera noninjected, nonicteric OU, Nares patent,  no craniofacial abnormalities, Mucous membranes, moist, no oral lesions, normal dentition, Mallampati class 3  NECK: Trachea midline, no accessory muscle use, no stridor, no cervical or supraclavicular adenopathy, JVP not elevated  CARDIAC: Reg, single s1/S2, no m/r/g  PULM: CTA bilaterally no wheezing, rhonchi or rales  ABD: Normoactive bowel sounds, soft nontender, nondistended, no rebound, no rigidity, no guarding  EXT: No cyanosis, no clubbing, no edema, normal capillary refill  NEURO: no focal neurologic deficits, AAOx3, moving all extremities appropriately    Lab Results   Component Value Date    WBC 7.56 08/30/2023    HGB 14.0 08/30/2023    HCT 41.7 08/30/2023    MCV 94 08/30/2023     08/30/2023     Lab Results   Component Value Date    SODIUM 139 01/13/2024    K 4.5 01/13/2024     01/13/2024    CO2 26 01/13/2024    BUN 18 01/13/2024    CREATININE 0.88 01/13/2024    GLUC 151 (H) 01/13/2024    CALCIUM 9.2 01/13/2024     Lab Results   Component Value Date    ALT 14 01/13/2024    AST 12 01/13/2024    ALKPHOS 47 01/13/2024       Sleep studies:  RBD Montage Diagnostic PSG 11/22/2022:  IMPRESSION:   Moderate obstructive sleep apnea. Respiratory events were worse in the supine position.   Normal baseline oxygen saturation.  Mild to moderate desaturations present with respiratory events.  Sleep efficiency was high. Stage N1 sleep (light sleep) was normal.. Stage N3 sleep (deep sleep) was normal.  REM sleep percentage was normal. REM sleep latency was normal.   Increased periodic limb movements during sleep. These did not contribute to significant sleep disruption and cortical arousals.   EKG showed normal sinus rhythm.   A REM sleep disorder montage was used.  No evidence of REM sleep behavior disorder noted.  In addition, a full EEG was used.  Rare left remporal sharp waves were seen. abnormal behaviors were not present.      RECOMMENDATION:  Treatment is recommended for obstructive sleep apnea, auto-CPAP at the range of 5-15 cm h20 is recommended.   A single test can not completely rule out REM sleep behavior disorder so if symptoms persist, especially with treatment of obstructive sleep apnea, repeat sleep testing can be considered.  If nocturnal seizures remain a concern, consideration could be made for ambulatory EEG or inpatient epilepsy monitoring for further assessment.        Compliance Data:  N/A (CPAP device was returned)        Toby Serrano DO

## 2024-02-07 NOTE — PROGRESS NOTES
Progress Note - Sleep Medicine  Olman Merlos 63 y.o. male MRN: [id number]       Impression & Plan:   Patient is a 63 y.o. male with PMH including epilepsy (controlled with Trileptal and cymbalta), cataract, cubital tunnel syndrome, carbital tunnel syndrome, balance disorder who presents for follow up of moderate obstructive sleep apnea and abnormal behaviors during sleep.    Discussed with patient that differential includes REM behavior disorder (although not identified on most recent extended RBD PSG on 11/22/2022), PSTD with associated nightmares, NREM parasomnias (including confusional arousals, sleep terrors, nightmares) ,nocutrnal frontal lobe episley, as well as sleep fragmenting untreated obstructive sleep apnea.     It was recommended at this time to increase the dose of Melatonin from 3mg to 6mg qhs to help reduce frequency of his abnormal behaviors further. He is unable to start potentially sedating medication such as Clonazepam at this time, as he states he is on call for work 24/7 and frequently needs to go into work during the night. He plans on retiring in September 2024. Advised that if frequency or severity of abnormal behaviors at night worsen in the interim, will need to discuss alternative treatment options. Pramipexole has been reported to effectively treat RBD when there are frequent PLMs during NREM sleep. Other agents with some reported success include imipramine, carbamazepine, sodium oxybate, triazolam, quetiapine, and clozapine. Additionally, I recommend he discuss Duloxetine with his Neurologist, as this  SNRIs are associated with RBD.    It was discussed that it is important to keep bedroom free from obstacles and objects that that may cause injury, making sure doors and windows are locked. We discussed that if these are parasomnias, gentle direction is needed and an individual should not be touched or accosted. Advised that if he has firearms in the home, we discussed gun safety  including keeping the firearm unloaded in a safe with a complicated lock.    He wishes to leave DAWSON untreated at this time. I explained the importance of treatment, and the consequences of untreated obstructive sleep apnea on underlying cardiovascular and cerebrovascular risk, morbidity, and mortality. Patient accepts risks associated with untreated DAWSON. He does not believe that he has DAWSON as a diagnosis. I offered to repeat a sleep study to evaluate again, however he declines.      Patient to follow up in office in approximately 3 months. Patient instructed to call office or send [employer] message with any questions or concerns in the interim.     1. Dream enactment behavior  -Increase Melatonin from 3mg to 6mg qhs    2. DAWSON (obstructive sleep apnea)     ________________________________________________________________    HPI:    Olman Merlos is a 63 y.o. male with PMH including epilepsy (controlled with Trileptal and cymbalta), cataract, cubital tunnel syndrome, carpal tunnel syndrome, balance disorder. She presents today for follow up of moderate obstructive sleep apnea and REM behavior disorder.      Has history of moderate DAWSON, initially diagnosed 11/2022 on an extended RBD montage diagnostic PSG, demonstrating AHI 16.9 (worse in supine position AHI 77.6). States he used CPAP consistently for at least one year, however he ultimately could not tolerate PAP therapy due to nocturnal RBD behaviors including yelling/screaming, and taking CPAP off at night. Did not feel more rested when he used CPAP consistently in the past.     He reports he has had a 3 year history of experiencing dream enactment behavior. Has very vivid dreams, usually being chased, or combat (with aliens or medieval times characters). He no longer sleeps in the same bed as his wife due to snoring, but as per his wife, he screams/yells, and fights frequently while sleeping. Sometimes he wakes up on the floor. He has had 2 serious falls out of bed  (did not seek medical attention afterwards). One time, hit his head through his wall - this occurred while sleeping. One time hit his head on a nightstand while sleeping. These episodes occur intermittently, and have lessened in frequency since starting Melatonin 3mg since last office visit.  Sometimes he goes months without an episode. As Melatonin 3mg seems to be effective, he has not tried increasing the dose.     On most recent sleep study, PLM index was 23.6. He denies restless sleep, or symptoms suggestive of RLS. Denies frequent nocturnal awakenings.     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 Work hours are 7:00AM to 3:30PM but he is on call 24/7 and is often called into work overnight. He plans on retiring in September.     Sleep schedule: Patient goes to bed at 8:30AM, sleep latency is 5 minutes. Denies noturnal awakenings. Wakes up spontaneously at 4:15AM. He sometimes sleep until 8:30AM. He sometimes unintentionally dozes and nods off while at work. He takes a short nap 2-3 days a week.     Denies anosmia, constipation. Admits to fine tremor RUE when he overuses his arms frequently at work. Denies resting tremor or intentional tremor. Denies changes in memory, concentration, mood or gait.    Patient denies symptoms of narcolepsy, cataplexy, sleep paralysis, hypnopompic or hypnagogic hallucinations.    [place]: 13/24  Sitting and reading: Moderate chance of dozing  Watching TV: Moderate chance of dozing  Sitting, inactive in a public place (e.g. a theatre or a meeting): Moderate chance of dozing  As a passenger in a car for an hour without a break: Moderate chance of dozing  Lying down to rest in the afternoon when circumstances permit: Moderate chance of dozing  Sitting and talking to someone: Slight chance of dozing  Sitting quietly after a lunch without alcohol: Moderate chance of dozing  In a car, while stopped for a few minutes in traffic: Would never doze  Total score: 13      Social history  "updates:  Social History     Tobacco Use   Smoking Status Former    Current packs/day: 0.00    Average packs/day: 2.0 packs/day for 45.0 years (90.0 ttl pk-yrs)    Types: Cigarettes    Start date: 1974    Quit date: 2019    Years since quittin.5   Smokeless Tobacco Never     Social History     Socioeconomic History    Marital status: /Civil Union     Spouse name: Not on file    Number of children: Not on file    Years of education: Not on file    Highest education level: Not on file   Occupational History    Not on file   Tobacco Use    Smoking status: Former     Current packs/day: 0.00     Average packs/day: 2.0 packs/day for 45.0 years (90.0 ttl pk-yrs)     Types: Cigarettes     Start date: 1974     Quit date: 2019     Years since quittin.5    Smokeless tobacco: Never   Vaping Use    Vaping status: Never Used   Substance and Sexual Activity    Alcohol use: Not Currently     Comment: quit 3/2019    Drug use: No    Sexual activity: Not on file   Other Topics Concern    Not on file   Social History Narrative    Not on file     Social Determinants of Health     Financial Resource Strain: Not on file   Food Insecurity: Not on file   Transportation Needs: Not on file   Physical Activity: Not on file   Stress: Not on file   Social Connections: Not on file   Intimate Partner Violence: Not on file   Housing Stability: Not on file       PhysicalExamination:  Vitals:   /79 (BP Location: Left arm, Patient Position: Sitting, Cuff Size: Large)   Ht 5' 8\" (1.727 m)   Wt 87.1 kg (192 lb)   BMI 29.19 kg/m²     Physical Exam:  General: Sitting in chair, awake alert and oriented to person, place, and time. No acute distress  HEENT: PERRL, nares patent, no craniofacial abnormalities. Mucous membranes, moist, no oral lesions, and normal dentition. Mallampati class IV, tonsils 1+  NECK:  Trachea midline, no accessory muscle use, and no stridor   CARDIAC: Regular rate and rhythm, no murmur " "  PULM: CTA bilaterally no wheezing, rhonchi or rales. No conversational dyspnea  EXT: No cyanosis, no clubbing, and no peripheral edema    NEURO: No focal neurologic deficits, moving all extremities appropriately    Diagnostic Data:  Labs:  I personally reviewed the most recent laboratory data pertinent to today's visit    Lab Results   Component Value Date    WBC 7.56 08/30/2023    HGB 14.0 08/30/2023    HCT 41.7 08/30/2023    MCV 94 08/30/2023     08/30/2023     Lab Results   Component Value Date    CALCIUM 9.2 01/13/2024    K 4.5 01/13/2024    CO2 26 01/13/2024     01/13/2024    BUN 18 01/13/2024    CREATININE 0.88 01/13/2024     No results found for: \"IGE\"  Lab Results   Component Value Date    ALT 14 01/13/2024    AST 12 01/13/2024    ALKPHOS 47 01/13/2024     Lab Results   Component Value Date    FERRITIN 217 08/27/2019     Lab Results   Component Value Date    DYKXDAYA76 543 08/27/2019     No results found for: \"FOLATE\"      Arterial Blood Gas result:  N/A     Sleep studies:  RBD Montage Diagnostic PSG 11/22/2022:  IMPRESSION:   Moderate obstructive sleep apnea. Respiratory events were worse in the supine position.   Normal baseline oxygen saturation.  Mild to moderate desaturations present with respiratory events.  Sleep efficiency was high. Stage N1 sleep (light sleep) was normal.. Stage N3 sleep (deep sleep) was normal.  REM sleep percentage was normal. REM sleep latency was normal.   Increased periodic limb movements during sleep. These did not contribute to significant sleep disruption and cortical arousals.   EKG showed normal sinus rhythm.   A REM sleep disorder montage was used.  No evidence of REM sleep behavior disorder noted.  In addition, a full EEG was used.  Rare left remporal sharp waves were seen. abnormal behaviors were not present.     RECOMMENDATION:  Treatment is recommended for obstructive sleep apnea, auto-CPAP at the range of 5-15 cm h20 is recommended.   A single test can " not completely rule out REM sleep behavior disorder so if symptoms persist, especially with treatment of obstructive sleep apnea, repeat sleep testing can be considered.  If nocturnal seizures remain a concern, consideration could be made for ambulatory EEG or inpatient epilepsy monitoring for further assessment.      Compliance Data:  N/A (CPAP device was returned)      Raquel Barnes   [facility]'s Sleep Medicine Fellow

## 2024-02-07 NOTE — PATIENT INSTRUCTIONS
-Please increase the dose of Melatonin from 3mg to 6mg every night   -Keep bedroom free from obstacles and objects that that may cause injury, making sure doors and windows are locked. We discussed that if these are parasomnias, gentle direction is needed you should not be touched or accosted. It is recommended that you keep all firearms unloaded in a safe with a complicated lock.

## 2024-02-19 ENCOUNTER — TELEPHONE (OUTPATIENT)
Dept: NEUROLOGY | Facility: CLINIC | Age: 64
End: 2024-02-19

## 2024-02-19 DIAGNOSIS — R43.0 ANOSMIA: ICD-10-CM

## 2024-02-19 DIAGNOSIS — R25.1 TREMOR: ICD-10-CM

## 2024-02-19 DIAGNOSIS — G40.209 EPILEPSY WITH PARTIAL COMPLEX SEIZURES, WITHOUT STATUS EPILEPTICUS (HCC): ICD-10-CM

## 2024-02-19 DIAGNOSIS — R68.89 SPELLS OF DECREASED ATTENTIVENESS: ICD-10-CM

## 2024-02-19 DIAGNOSIS — F41.9 ANXIETY: ICD-10-CM

## 2024-02-19 DIAGNOSIS — R42 DIZZINESS: ICD-10-CM

## 2024-02-19 RX ORDER — OXCARBAZEPINE 300 MG/1
TABLET, FILM COATED ORAL
Qty: 540 TABLET | Refills: 3 | Status: SHIPPED | OUTPATIENT
Start: 2024-02-19

## 2024-02-19 RX ORDER — DULOXETIN HYDROCHLORIDE 60 MG/1
CAPSULE, DELAYED RELEASE ORAL
Qty: 90 CAPSULE | Refills: 3 | Status: SHIPPED | OUTPATIENT
Start: 2024-02-19

## 2024-02-19 NOTE — TELEPHONE ENCOUNTER
Team,     please call pt and make a f/u appointment with me  in the next few weeks     He usually likes 8;00am or 8;30 appointments

## 2024-02-20 ENCOUNTER — TELEPHONE (OUTPATIENT)
Dept: NEUROLOGY | Facility: CLINIC | Age: 64
End: 2024-02-20

## 2024-02-20 ENCOUNTER — TELEPHONE (OUTPATIENT)
Age: 64
End: 2024-02-20

## 2024-02-20 ENCOUNTER — PATIENT MESSAGE (OUTPATIENT)
Dept: FAMILY MEDICINE CLINIC | Facility: CLINIC | Age: 64
End: 2024-02-20

## 2024-02-20 NOTE — PATIENT COMMUNICATION
Spoke w/pt's wife Alexandra. Advised Alexandra that a work note for the rest of the week would require a virtual appointment with any provider that has availability. Pt's wife will have pt call to schedule.

## 2024-02-21 ENCOUNTER — TELEMEDICINE (OUTPATIENT)
Dept: FAMILY MEDICINE CLINIC | Facility: CLINIC | Age: 64
End: 2024-02-21
Payer: COMMERCIAL

## 2024-02-21 DIAGNOSIS — U07.1 COVID-19: Primary | ICD-10-CM

## 2024-02-21 PROCEDURE — 99213 OFFICE O/P EST LOW 20 MIN: CPT

## 2024-02-21 NOTE — ASSESSMENT & PLAN NOTE
Patient presents today via telemedicine for evaluation of his recent COVID-19 infection.  Patient started with symptoms on Sunday night and tested positive on Monday morning.  His main symptoms are muscle aches, fever, chills, congestion, and cough.  Discussed Paxlovid with him today, which he would like to defer.  He would like to continue treatment with [place] cold and flu and NyQuil.  Discussed with him that he is approved to continue taking these.  He can also add in vitamin C, vitamin D, and zinc to help with his COVID symptoms and to prevent post COVID symptoms.  Given work note via [employer] to approve him off for this week while he quarantined at home.  Patient given ER precautions with any significant shortness of breath, chest pain, or worsening symptoms.  Patient agreeable with plan today and will follow-up with us as needed if his symptoms persist or worsen.

## 2024-02-21 NOTE — LETTER
February 21, 2024     Patient: Olman Merlos  YOB: 1960  Date of Visit: 2/21/2024      To Whom it May Concern:    Olman Merlos is under my professional care. Olman was seen in my office on 2/21/2024. Olman is currently symptomatic from a COVID-19 infection, and is requiring to quarantine from 2/19 through 2/23. After this period of quarantine, Olman is approved to return to work per company policy. Olman may return to work on 2/26/2024 .    If you have any questions or concerns, please don't hesitate to call.         Sincerely,          Janny Solano PA-C        CC: No Recipients

## 2024-02-21 NOTE — PROGRESS NOTES
COVID-19 Outpatient Progress Note    Assessment/Plan:    Problem List Items Addressed This Visit       COVID-19 - Primary     Patient presents today via telemedicine for evaluation of his recent COVID-19 infection.  Patient started with symptoms on Sunday night and tested positive on Monday morning.  His main symptoms are muscle aches, fever, chills, congestion, and cough.  Discussed Paxlovid with him today, which he would like to defer.  He would like to continue treatment with [place] cold and flu and NyQuil.  Discussed with him that he is approved to continue taking these.  He can also add in vitamin C, vitamin D, and zinc to help with his COVID symptoms and to prevent post COVID symptoms.  Given work note via [employer] to approve him off for this week while he quarantined at home.  Patient given ER precautions with any significant shortness of breath, chest pain, or worsening symptoms.  Patient agreeable with plan today and will follow-up with us as needed if his symptoms persist or worsen.           Disposition:     Discussed symptom directed medication options with patient. Discussed vitamin D, vitamin C, and/or zinc supplementation with patient.     I have spent a total time of 20 minutes on the day of the encounter for this patient including risks and benefits of treatment options, instructions for management, patient and family education, importance of treatment compliance, counseling/coordination of care, documenting in the medical record, reviewing/ordering tests, medicine, procedures and obtaining or reviewing history. Defers paxlovid      Encounter provider: Janny Solano PA-C     Provider located at: [id number]   [address]  [place] [address] [phone]     Recent Visits  No visits were found meeting these conditions.  Showing recent visits within past 7 days and meeting all other requirements  Today's Visits  Date Type Provider Dept    02/21/24 Telemedicine Janny Solano PA-C [facility]   Showing today's visits and meeting all other requirements  Future Appointments  No visits were found meeting these conditions.  Showing future appointments within next 150 days and meeting all other requirements     This virtual check-in was done via [employer] and patient was informed that this is a secure, HIPAA-compliant platform. He agrees to proceed.    Patient agrees to participate in a virtual check in via telephone or video visit instead of presenting to the office to address urgent/immediate medical needs. Patient is aware this is a billable service. He acknowledged consent and understanding of privacy and security of the video platform. The patient has agreed to participate and understands they can discontinue the visit at any time.    After connecting through [employer], the patient was identified by name and date of birth. Olman Merlos was informed that this was a telemedicine visit and that the exam was being conducted confidentially over secure lines. My office door was closed. No one else was in the room. Olman Merlos acknowledged consent and understanding of privacy and security of the telemedicine visit. I informed the patient that I have reviewed his record in Epic and presented the opportunity for him to ask any questions regarding the visit today. The patient agreed to participate.     Verification of patient location:  Patient is located in the following state in which I hold an active license: [place]    Subjective:   Olman Merlos is a 63 y.o. male who is concerned about COVID-19. Patient's symptoms include fever, chills, nasal congestion, sore throat, cough, myalgias and headache. Patient denies fatigue, malaise, shortness of breath, chest tightness, abdominal pain, nausea and vomiting.     - Date of symptom onset: 2/18/2024      COVID-19 vaccination status: Fully vaccinated with booster    Exposure:   Contact with a  person who is under investigation (PUI) for or who is positive for COVID-19 within the last 14 days?: No    Hospitalized recently for fever and/or lower respiratory symptoms?: No      Currently a healthcare worker that is involved in direct patient care?: No      Tested positive on Monday  Has been taking alkaseltzer and nyquil     Lab Results   Component Value Date    SARSCOV2 Negative 12/30/2021    SARSCORONAVI Detected (A) 10/09/2022       Review of Systems   Constitutional:  Positive for chills and fever. Negative for fatigue.   HENT:  Positive for congestion and sore throat. Negative for postnasal drip, sinus pressure and sinus pain.    Respiratory:  Positive for cough. Negative for chest tightness and shortness of breath.    Cardiovascular:  Negative for chest pain, palpitations and leg swelling.   Gastrointestinal:  Negative for abdominal pain, nausea and vomiting.   Musculoskeletal:  Positive for myalgias.   Neurological:  Positive for headaches. Negative for dizziness and light-headedness.     Current Outpatient Medications on File Prior to Visit   Medication Sig    DULoxetine (CYMBALTA) 60 mg delayed release capsule TAKE 1 CAPSULE DAILY    Ibuprofen 200 MG CAPS Take by mouth as needed    Melatonin 3-10 MG TABS Take 3 mg by mouth At bedtime    OXcarbazepine (TRILEPTAL) 300 mg tablet TAKE 3 TABLETS IN THE MORNING AND 3 TABLETS AT NIGHT       Objective:    There were no vitals taken for this visit.       Physical Exam  Constitutional:       General: He is not in acute distress.     Appearance: Normal appearance. He is not ill-appearing.   HENT:      Head: Normocephalic and atraumatic.   Pulmonary:      Effort: Pulmonary effort is normal. No respiratory distress.   Skin:     Coloration: Skin is not cyanotic or pale.   Neurological:      General: No focal deficit present.      Mental Status: He is alert and oriented to person, place, and time.   Psychiatric:         Mood and Affect: Mood normal.          Behavior: Behavior normal.         Judgment: Judgment normal.       Janny Solano PA-C

## 2024-03-15 ENCOUNTER — TELEPHONE (OUTPATIENT)
Dept: NEUROLOGY | Facility: CLINIC | Age: 64
End: 2024-03-15

## 2024-03-15 NOTE — TELEPHONE ENCOUNTER
Made an appt reminder call no answer lmom. Appt with dr lowry on 3/20/24 @8:00 am in [facility].

## 2024-03-20 ENCOUNTER — OFFICE VISIT (OUTPATIENT)
Dept: NEUROLOGY | Facility: CLINIC | Age: 64
End: 2024-03-20
Payer: COMMERCIAL

## 2024-03-20 VITALS
DIASTOLIC BLOOD PRESSURE: 62 MMHG | TEMPERATURE: 97.6 F | HEART RATE: 54 BPM | HEIGHT: 68 IN | WEIGHT: 189 LBS | BODY MASS INDEX: 28.64 KG/M2 | RESPIRATION RATE: 14 BRPM | OXYGEN SATURATION: 97 % | SYSTOLIC BLOOD PRESSURE: 112 MMHG

## 2024-03-20 DIAGNOSIS — G40.209 NONINTRACTABLE EPILEPSY WITH COMPLEX PARTIAL SEIZURES (HCC): Primary | ICD-10-CM

## 2024-03-20 PROCEDURE — 99213 OFFICE O/P EST LOW 20 MIN: CPT | Performed by: PSYCHIATRY & NEUROLOGY

## 2024-03-20 NOTE — PROGRESS NOTES
Patient ID: Olman Merlos is a 63 y.o. male.    Assessment/Plan:    Nonintractable epilepsy with complex partial seizures (HCC)  Olman  is doing well and without any recurrence of seizures he is on a combination therapy of Trileptal 900 mg bid and duloxetine of 60.  Previously prior to this combination he was having auras and change in behavior.  He was unable to work for periods of time and this is improved with this combination.  Therefore I am reluctant to alter his current regimen.  I have ordered the oxcarbazepine level.  The last time it was normal.  Otherwise a CBC and CMP were reviewed today. They were stable        Diagnoses and all orders for this visit:    Nonintractable epilepsy with complex partial seizures (HCC)  -     Oxcarbazepine level; Future       Subjective:    Olman Merlos is a 63  y.o. male w/ PMH focal onset impaired aware seizures currently adequately controlled on oxcarbazepine 900 mg bid .  Denies any recurrent episodes and the staring off episodes have improved.    Since his last visit he has not followed up with sleep multiple times.  In consideration included alteration of the duloxetine however on further discussion the use of duloxetine and Trileptal has improved his symptoms conservatively.  He is now driving and back to his normal activities.  He is now on melatonin 6 mg at bedtime which has helped.  However if he awakens in the melanite and returns to sleep he does have those movements.         He did try gabapentin but this resulted in nausea.  Therefore it cannot be used for restlessness in his legs           .     He does complain of a tremor involving the right upper extremity but he has no Parkinson's like features.  Did occur at rest but it was at of a lower amplitude.          He denies any similar nocturnal type seizures.     He did not have any recent laboratories     Today he is accompanied by his wife Alexandra                 The following portions of the patient's history  "were reviewed and updated as appropriate: He  has a past medical history of Anxiety, Arthritis, Carpal tunnel syndrome, bilateral, Epilepsy (HCC), Exercises daily, Seizure disorder (HCC), and Wrist pain.  He  has a past surgical history that includes Cataract extraction (Bilateral); Wrist surgery (Left); Colonoscopy (2010); EGD (08/2019); Colonoscopy (02/2021); Appendectomy; Foot surgery (Right); and pr neuroplasty &/transpos median nrv carpal tunne (Bilateral, 3/19/2021).  His family history includes Breast cancer in his sister; Diabetes in his maternal aunt; No Known Problems in his father and mother.  He  reports that he quit smoking about 4 years ago. His smoking use included cigarettes. He started smoking about 49 years ago. He has a 90 pack-year smoking history. He has never used smokeless tobacco. He reports that he does not currently use alcohol. He reports that he does not use drugs.  Current Outpatient Medications   Medication Sig Dispense Refill    DULoxetine (CYMBALTA) 60 mg delayed release capsule TAKE 1 CAPSULE DAILY 90 capsule 3    Ibuprofen 200 MG CAPS Take by mouth as needed      Melatonin 3-10 MG TABS Take 3 mg by mouth At bedtime      OXcarbazepine (TRILEPTAL) 300 mg tablet TAKE 3 TABLETS IN THE MORNING AND 3 TABLETS AT NIGHT 540 tablet 3     Current Facility-Administered Medications   Medication Dose Route Frequency Provider Last Rate Last Admin    lidocaine-epinephrine (XYLOCAINE/EPINEPHRINE) 1 %-1:100,000 injection 30 mL  30 mL Infiltration Once Jarad Briones MD         He has No Known Allergies..         Objective:    Blood pressure 112/62, pulse (!) 54, temperature 97.6 °F (36.4 °C), temperature source Temporal, resp. rate 14, height 5' 8\" (1.727 m), weight 85.7 kg (189 lb), SpO2 97%.    Physical Exam  Eyes:      General: Lids are normal.      Extraocular Movements: Extraocular movements intact.      Pupils: Pupils are equal, round, and reactive to light.   Neurological:      Motor: Motor " strength is normal.     Deep Tendon Reflexes:      Reflex Scores:       Patellar reflexes are 2+ on the right side and 2+ on the left side.       Achilles reflexes are 1+ on the right side and 1+ on the left side.        Neurological Exam  Mental Status  Awake, alert and oriented to person, place and time. Oriented to person, place and time. Language is fluent with no aphasia.    Cranial Nerves  CN II: Visual acuity is normal. Visual fields full to confrontation.  CN III, IV, VI: Extraocular movements intact bilaterally. Normal lids and orbits bilaterally. Pupils equal round and reactive to light bilaterally.  CN V: Facial sensation is normal.  CN VII: Full and symmetric facial movement.  CN VIII: Hearing is normal.  CN IX, X: Palate elevates symmetrically. Normal gag reflex.  CN XI: Shoulder shrug strength is normal.  CN XII: Tongue midline without atrophy or fasciculations.    Motor  Normal muscle bulk throughout. No fasciculations present. Strength is 5/5 throughout all four extremities.    Sensory  Light touch is normal in upper and lower extremities. Temperature is normal in upper and lower extremities.     Reflexes                                            Right                      Left  Patellar                                2+                         2+  Achilles                                1+                         1+    Right pathological reflexes: Di's absent.  Left pathological reflexes: Di's absent.    Coordination  Right: Finger-to-nose normal.Left: Finger-to-nose normal.    Gait  Normal casual, toe, heel and tandem gait.    Review of systems obtained from the medical assistant as below was reviewed with the patient at today's visit    ROS:    Review of Systems   Constitutional:  Negative for appetite change, fatigue and fever.   HENT: Negative.  Negative for hearing loss, tinnitus, trouble swallowing and voice change.    Eyes: Negative.  Negative for photophobia, pain and visual  disturbance.   Respiratory: Negative.  Negative for shortness of breath.    Cardiovascular: Negative.  Negative for palpitations.   Gastrointestinal: Negative.  Negative for nausea and vomiting.   Endocrine: Negative.  Negative for cold intolerance.   Genitourinary: Negative.  Negative for dysuria, frequency and urgency.   Musculoskeletal:  Negative for back pain, gait problem, myalgias, neck pain and neck stiffness.   Skin: Negative.  Negative for rash.   Allergic/Immunologic: Negative.    Neurological:  Positive for tremors. Negative for dizziness, seizures, syncope, facial asymmetry, speech difficulty, weakness, light-headedness, numbness and headaches.   Hematological:  Bruises/bleeds easily.   Psychiatric/Behavioral: Negative.  Negative for confusion, hallucinations and sleep disturbance.    All other systems reviewed and are negative.        He can return to our offices in 6 months.    I have spent a total time of 20 minutes on 03/20/24 in caring for this patient including Counseling / Coordination of care, Documenting in the medical record, Reviewing / ordering tests, medicine, procedures  , and Obtaining or reviewing history  .

## 2024-03-20 NOTE — ASSESSMENT & PLAN NOTE
Olman  is doing well and without any recurrence of seizures he is on a combination therapy of Trileptal 900 mg bid and duloxetine of 60.  Previously prior to this combination he was having auras and change in behavior.  He was unable to work for periods of time and this is improved with this combination.  Therefore I am reluctant to alter his current regimen.  I have ordered the oxcarbazepine level.  The last time it was normal.  Otherwise a CBC and CMP were reviewed today. They were stable

## 2024-08-26 ENCOUNTER — APPOINTMENT (OUTPATIENT)
Dept: LAB | Facility: CLINIC | Age: 64
End: 2024-08-26
Payer: COMMERCIAL

## 2024-08-26 DIAGNOSIS — G40.209 NONINTRACTABLE EPILEPSY WITH COMPLEX PARTIAL SEIZURES (HCC): ICD-10-CM

## 2024-08-26 DIAGNOSIS — Z12.5 SCREENING FOR PROSTATE CANCER: ICD-10-CM

## 2024-08-26 DIAGNOSIS — Z13.0 SCREENING FOR IRON DEFICIENCY ANEMIA: ICD-10-CM

## 2024-08-26 DIAGNOSIS — Z13.29 SCREENING FOR THYROID DISORDER: ICD-10-CM

## 2024-08-26 DIAGNOSIS — Z13.1 SCREENING FOR DIABETES MELLITUS: ICD-10-CM

## 2024-08-26 DIAGNOSIS — Z13.220 SCREENING FOR CHOLESTEROL LEVEL: ICD-10-CM

## 2024-08-26 LAB
ALBUMIN SERPL BCG-MCNC: 4.2 G/DL (ref 3.5–5)
ALP SERPL-CCNC: 47 U/L (ref 34–104)
ALT SERPL W P-5'-P-CCNC: 15 U/L (ref 7–52)
ANION GAP SERPL CALCULATED.3IONS-SCNC: 7 MMOL/L (ref 4–13)
AST SERPL W P-5'-P-CCNC: 14 U/L (ref 13–39)
BILIRUB SERPL-MCNC: 0.27 MG/DL (ref 0.2–1)
BUN SERPL-MCNC: 21 MG/DL (ref 5–25)
CALCIUM SERPL-MCNC: 8.8 MG/DL (ref 8.4–10.2)
CHLORIDE SERPL-SCNC: 105 MMOL/L (ref 96–108)
CHOLEST SERPL-MCNC: 181 MG/DL
CO2 SERPL-SCNC: 26 MMOL/L (ref 21–32)
CREAT SERPL-MCNC: 0.83 MG/DL (ref 0.6–1.3)
ERYTHROCYTE [DISTWIDTH] IN BLOOD BY AUTOMATED COUNT: 12.4 % (ref 11.6–15.1)
EST. AVERAGE GLUCOSE BLD GHB EST-MCNC: 114 MG/DL
GFR SERPL CREATININE-BSD FRML MDRD: 92 ML/MIN/1.73SQ M
GLUCOSE P FAST SERPL-MCNC: 141 MG/DL (ref 65–99)
HBA1C MFR BLD: 5.6 %
HCT VFR BLD AUTO: 41.2 % (ref 36.5–49.3)
HDLC SERPL-MCNC: 39 MG/DL
HGB BLD-MCNC: 13.9 G/DL (ref 12–17)
LDLC SERPL CALC-MCNC: 132 MG/DL (ref 0–100)
MCH RBC QN AUTO: 31.5 PG (ref 26.8–34.3)
MCHC RBC AUTO-ENTMCNC: 33.7 G/DL (ref 31.4–37.4)
MCV RBC AUTO: 93 FL (ref 82–98)
PLATELET # BLD AUTO: 209 THOUSANDS/UL (ref 149–390)
PMV BLD AUTO: 9.8 FL (ref 8.9–12.7)
POTASSIUM SERPL-SCNC: 4.5 MMOL/L (ref 3.5–5.3)
PROT SERPL-MCNC: 6.5 G/DL (ref 6.4–8.4)
PSA SERPL-MCNC: 0.43 NG/ML (ref 0–4)
RBC # BLD AUTO: 4.41 MILLION/UL (ref 3.88–5.62)
SODIUM SERPL-SCNC: 138 MMOL/L (ref 135–147)
TRIGL SERPL-MCNC: 52 MG/DL
TSH SERPL DL<=0.05 MIU/L-ACNC: 3.67 UIU/ML (ref 0.45–4.5)
WBC # BLD AUTO: 6.47 THOUSAND/UL (ref 4.31–10.16)

## 2024-08-26 PROCEDURE — 83036 HEMOGLOBIN GLYCOSYLATED A1C: CPT

## 2024-08-26 PROCEDURE — 85027 COMPLETE CBC AUTOMATED: CPT

## 2024-08-26 PROCEDURE — 84443 ASSAY THYROID STIM HORMONE: CPT

## 2024-08-26 PROCEDURE — 80053 COMPREHEN METABOLIC PANEL: CPT

## 2024-08-26 PROCEDURE — 36415 COLL VENOUS BLD VENIPUNCTURE: CPT

## 2024-08-26 PROCEDURE — 80183 DRUG SCRN QUANT OXCARBAZEPIN: CPT

## 2024-08-26 PROCEDURE — 80061 LIPID PANEL: CPT

## 2024-08-26 PROCEDURE — 84153 ASSAY OF PSA TOTAL: CPT

## 2024-08-28 ENCOUNTER — TELEPHONE (OUTPATIENT)
Age: 64
End: 2024-08-28

## 2024-08-28 NOTE — TELEPHONE ENCOUNTER
Kiara Waterman, DO        Labs reviewed , performed on 08/26/24 , no additional testing needed      _____________________________________        I left pt a detailed msg per communication consent on file, and informed Dr Waterman's note above. IF he has any other questions he may call our office back.

## 2024-08-28 NOTE — TELEPHONE ENCOUNTER
PT CALLED TO SEE IF ANY BLOOD WORK WAS ORDERED BY Kiara Waterman FOR HIS APPT ON 9/4/24 @ 8 AM.  IF SO PT HAD BLOOD  WORK DONE FOR ANOTHER DOCTOR AND WANT TO KNOW IF ANY OF THE TEST HE HAD DONE CAN BE USED FOR Kiara Waterman?      AS PER PT HE WOULD LIKE TO KNOW ASAP SO HE CAN HAVE THE BLOOD WORK COMPLETED IF NEEDED BEFORE HIS APPT ON 9/4/24 @ 8 AM         PLEASE ASSIST,  THANK YOU     PAST SURGICAL HISTORY:  Back injury s/p surgery L1-L9 in two separate surgeries 2003 & 2006    Encounter for interrogation of neurostimulator 12/2015    H/O rotator cuff surgery 12/2015    History of skin surgery melanoma excision - left cheek/face 2016    S/P angioplasty with stent RCA 2008 & LAD 2014 cardiac stent placement    S/P cataract extraction     S/P knee replacement, bilateral

## 2024-08-29 LAB — OXCARBAZEPINE SERPL-MCNC: 23 UG/ML (ref 10–35)

## 2024-09-04 ENCOUNTER — OFFICE VISIT (OUTPATIENT)
Dept: NEUROLOGY | Facility: CLINIC | Age: 64
End: 2024-09-04
Payer: COMMERCIAL

## 2024-09-04 ENCOUNTER — TELEPHONE (OUTPATIENT)
Dept: NEUROLOGY | Facility: CLINIC | Age: 64
End: 2024-09-04

## 2024-09-04 VITALS
OXYGEN SATURATION: 98 % | WEIGHT: 186.8 LBS | HEART RATE: 58 BPM | SYSTOLIC BLOOD PRESSURE: 132 MMHG | BODY MASS INDEX: 28.31 KG/M2 | RESPIRATION RATE: 14 BRPM | DIASTOLIC BLOOD PRESSURE: 82 MMHG | HEIGHT: 68 IN | TEMPERATURE: 97.9 F

## 2024-09-04 DIAGNOSIS — R25.1 TREMOR: ICD-10-CM

## 2024-09-04 DIAGNOSIS — R43.0 ANOSMIA: ICD-10-CM

## 2024-09-04 DIAGNOSIS — R68.89 SPELLS OF DECREASED ATTENTIVENESS: ICD-10-CM

## 2024-09-04 DIAGNOSIS — G40.209 EPILEPSY WITH PARTIAL COMPLEX SEIZURES, WITHOUT STATUS EPILEPTICUS (HCC): ICD-10-CM

## 2024-09-04 DIAGNOSIS — R42 DIZZINESS: ICD-10-CM

## 2024-09-04 DIAGNOSIS — F41.9 ANXIETY: ICD-10-CM

## 2024-09-04 DIAGNOSIS — G47.01 INSOMNIA DUE TO MEDICAL CONDITION: ICD-10-CM

## 2024-09-04 DIAGNOSIS — G40.209 NONINTRACTABLE EPILEPSY WITH COMPLEX PARTIAL SEIZURES (HCC): Primary | ICD-10-CM

## 2024-09-04 PROCEDURE — 99213 OFFICE O/P EST LOW 20 MIN: CPT | Performed by: PSYCHIATRY & NEUROLOGY

## 2024-09-04 RX ORDER — DULOXETIN HYDROCHLORIDE 60 MG/1
60 CAPSULE, DELAYED RELEASE ORAL DAILY
Qty: 90 CAPSULE | Refills: 3 | Status: SHIPPED | OUTPATIENT
Start: 2024-09-04

## 2024-09-04 RX ORDER — OXCARBAZEPINE 300 MG/1
TABLET, FILM COATED ORAL
Qty: 540 TABLET | Refills: 3 | Status: SHIPPED | OUTPATIENT
Start: 2024-09-04

## 2024-09-04 NOTE — TELEPHONE ENCOUNTER
Social work    The patient recently turned 64 and does have many questions regarding the type of Medicare coverage.    Could you help him finding a Medicare advisor      Thank you

## 2024-09-04 NOTE — ASSESSMENT & PLAN NOTE
The insomnia is better.  He utilizes melatonin as needed.  He did utilize CPAP but this was ineffective.  He does not have any nocturnal events and therefore ambulatory EEG or EEG monitoring is not indicated at this time.

## 2024-09-04 NOTE — PROGRESS NOTES
Patient ID: Olman Merlos is a 64 y.o. male.    Assessment/Plan:    Nonintractable epilepsy with complex partial seizures (HCC)  Olman is a 64-year-old gentleman with epilepsy.  This is well-controlled on the current dose of Trileptal 900 twice a day.  He is also on duloxetine for associated anxiety and depression.  His recent Trileptal level and CBC and CMP was normal.  My concern is that altering this regimen may result in recurrent symptoms.  He will be changing his insurance at the end of the month and I have asked him to ensure that his medications would be covered.  He does have questions regarding Medicare.  Medicare advisor may be helpful.    I have ordered repeat studies in approximately 6 months.  He is to follow-up in 7 months.       Diagnoses and all orders for this visit:    Nonintractable epilepsy with complex partial seizures (HCC)    Spells of decreased attentiveness  -     OXcarbazepine (TRILEPTAL) 300 mg tablet; TAKE 3 TABLETS IN THE MORNING AND 3 TABLETS AT NIGHT    Epilepsy with partial complex seizures, without status epilepticus (HCC)  -     OXcarbazepine (TRILEPTAL) 300 mg tablet; TAKE 3 TABLETS IN THE MORNING AND 3 TABLETS AT NIGHT  -     CBC and differential; Future  -     Comprehensive metabolic panel; Future  -     Oxcarbazepine level; Future    Dizziness  -     DULoxetine (CYMBALTA) 60 mg delayed release capsule; Take 1 capsule (60 mg total) by mouth daily    Tremor  -     DULoxetine (CYMBALTA) 60 mg delayed release capsule; Take 1 capsule (60 mg total) by mouth daily    Anosmia  -     DULoxetine (CYMBALTA) 60 mg delayed release capsule; Take 1 capsule (60 mg total) by mouth daily    Anxiety  -     DULoxetine (CYMBALTA) 60 mg delayed release capsule; Take 1 capsule (60 mg total) by mouth daily    Insomnia due to medical condition       Subjective:       Olman Merlos is a 63  y.o. male w/ PMH focal onset impaired aware seizures currently adequately controlled on oxcarbazepine 900 mg bid  ".  He denies any nocturnal seizures.  He denies any staring episodes.  He has since retired and is concerned about potential insurance change .       He was seen by sleep medicine and did try the CPAP multiple times but he was unable to tolerate it.  Otherwise sleeping well and does not utilize melatonin on a regular basis.  He does utilize it as needed        .     He does complain of a tremor involving the right upper extremity but he has no Parkinson's like features.  Did occur at rest but it was at of a lower amplitude.          He denies any similar nocturnal type seizures.           Today a long discussion regarding his but his upcoming insurance change he will be changing to Medicare at the end of the month he is to notify us if and when his pharmacy changes.  He does require some help with deciding on certain plans.  I will contact the  regarding medicare               The following portions of the patient's history were reviewed and updated as appropriate: He  has a past medical history of Anxiety, Arthritis, Carpal tunnel syndrome, bilateral, Epilepsy (HCC), Exercises daily, Seizure disorder (HCC), and Wrist pain..         Objective:    Blood pressure 132/82, pulse 58, temperature 97.9 °F (36.6 °C), temperature source Temporal, resp. rate 14, height 5' 8\" (1.727 m), weight 84.7 kg (186 lb 12.8 oz), SpO2 98%.    Physical Exam  Eyes:      General: Lids are normal.      Extraocular Movements: Extraocular movements intact.      Pupils: Pupils are equal, round, and reactive to light.   Neurological:      Motor: Motor strength is normal.     Deep Tendon Reflexes:      Reflex Scores:       Patellar reflexes are 2+ on the right side and 2+ on the left side.       Achilles reflexes are 1+ on the right side and 1+ on the left side.        Neurological Exam  Mental Status  Awake, alert and oriented to person, place and time. Oriented to person, place and time. Language is fluent with no " aphasia.    Cranial Nerves  CN II: Visual acuity is normal. Visual fields full to confrontation.  CN III, IV, VI: Extraocular movements intact bilaterally. Normal lids and orbits bilaterally. Pupils equal round and reactive to light bilaterally.  CN V: Facial sensation is normal.  CN VII: Full and symmetric facial movement.  CN VIII: Hearing is normal.  CN IX, X: Palate elevates symmetrically. Normal gag reflex.  CN XI: Shoulder shrug strength is normal.  CN XII: Tongue midline without atrophy or fasciculations.    Motor  Normal muscle bulk throughout. No fasciculations present. Strength is 5/5 throughout all four extremities.    Sensory  Light touch is normal in upper and lower extremities. Temperature is normal in upper and lower extremities.     Reflexes                                            Right                      Left  Patellar                                2+                         2+  Achilles                                1+                         1+  Right Plantar: downgoing  Left Plantar: downgoing    Right pathological reflexes: Di's absent.  Left pathological reflexes: Di's absent.    Coordination  Right: Finger-to-nose normal.Left: Finger-to-nose normal.    Gait  Normal casual, toe, heel and tandem gait. Able to rise from chair without using arms.    Review of systems obtained from the medical assistant as below was reviewed with the patient at today's visit    ROS:    Review of Systems   Constitutional:  Negative for appetite change, fatigue and fever.   HENT: Negative.  Negative for hearing loss, tinnitus, trouble swallowing and voice change.    Eyes: Negative.  Negative for photophobia, pain and visual disturbance.   Respiratory:  Positive for shortness of breath.    Cardiovascular:  Positive for palpitations.   Gastrointestinal: Negative.  Negative for nausea and vomiting.   Endocrine: Negative.  Negative for cold intolerance.   Genitourinary: Negative.  Negative for dysuria,  frequency and urgency.   Musculoskeletal:  Positive for back pain, neck pain and neck stiffness. Negative for gait problem and myalgias.   Skin: Negative.  Negative for rash.   Allergic/Immunologic: Negative.    Neurological:  Positive for tremors (not all the time only right hand). Negative for dizziness, seizures, syncope, facial asymmetry, speech difficulty, weakness, light-headedness, numbness and headaches.   Hematological: Negative.  Does not bruise/bleed easily.   Psychiatric/Behavioral: Negative.  Negative for confusion, hallucinations and sleep disturbance.    All other systems reviewed and are negative.  I have spent a total time of 21 minutes in caring for this patient on the day of the visit/encounter including Diagnostic results, Counseling / Coordination of care, Documenting in the medical record, Reviewing / ordering tests, medicine, procedures  , and Obtaining or reviewing history  .      F/u in 7 months

## 2024-09-04 NOTE — ASSESSMENT & PLAN NOTE
Olman is a 64-year-old gentleman with epilepsy.  This is well-controlled on the current dose of Trileptal 900 twice a day.  He is also on duloxetine for associated anxiety and depression.  His recent Trileptal level and CBC and CMP was normal.  My concern is that altering this regimen may result in recurrent symptoms.  He will be changing his insurance at the end of the month and I have asked him to ensure that his medications would be covered.  He does have questions regarding Medicare.  Medicare advisor may be helpful.    I have ordered repeat studies in approximately 6 months.  He is to follow-up in 7 months.

## 2024-09-15 ENCOUNTER — OFFICE VISIT (OUTPATIENT)
Dept: URGENT CARE | Facility: CLINIC | Age: 64
End: 2024-09-15
Payer: COMMERCIAL

## 2024-09-15 VITALS
TEMPERATURE: 98 F | DIASTOLIC BLOOD PRESSURE: 83 MMHG | OXYGEN SATURATION: 97 % | SYSTOLIC BLOOD PRESSURE: 155 MMHG | RESPIRATION RATE: 16 BRPM | HEART RATE: 63 BPM

## 2024-09-15 DIAGNOSIS — H10.9 BACTERIAL CONJUNCTIVITIS OF LEFT EYE: ICD-10-CM

## 2024-09-15 DIAGNOSIS — H65.01 NON-RECURRENT ACUTE SEROUS OTITIS MEDIA OF RIGHT EAR: ICD-10-CM

## 2024-09-15 DIAGNOSIS — J32.9 BACTERIAL SINUSITIS: Primary | ICD-10-CM

## 2024-09-15 DIAGNOSIS — B96.89 BACTERIAL SINUSITIS: Primary | ICD-10-CM

## 2024-09-15 PROCEDURE — S9083 URGENT CARE CENTER GLOBAL: HCPCS | Performed by: NURSE PRACTITIONER

## 2024-09-15 PROCEDURE — G0383 LEV 4 HOSP TYPE B ED VISIT: HCPCS | Performed by: NURSE PRACTITIONER

## 2024-09-15 RX ORDER — OFLOXACIN 3 MG/ML
10 SOLUTION AURICULAR (OTIC) 2 TIMES DAILY
Qty: 5 ML | Refills: 0 | Status: SHIPPED | OUTPATIENT
Start: 2024-09-15

## 2024-09-15 RX ORDER — TOBRAMYCIN 3 MG/ML
1 SOLUTION/ DROPS OPHTHALMIC
Qty: 1.8 ML | Refills: 0 | Status: SHIPPED | OUTPATIENT
Start: 2024-09-15 | End: 2024-09-22

## 2024-09-15 NOTE — PATIENT INSTRUCTIONS
Take zyrtec or allegra daily  Use flonase 1-2 sprays in each nare daily   Use nasal saline to the nose,   Use humidifer in room  Symptoms worsen go to ER  Rest      Use ear drops to the ear only  Use EYE drops to the EYES only  Take oral antibiotic as directed     Symptoms worsen go to ER and call pcp

## 2024-09-15 NOTE — PROGRESS NOTES
"NAME: Olman Merlos is a 64 y.o. male  : 1960    MRN: [id number]    /83 (BP Location: Left arm, Patient Position: Sitting, Cuff Size: Standard)   Pulse 63   Temp 98 °F (36.7 °C) (Tympanic)   Resp 16   SpO2 97%     9:38 AM    Assessment and Plan   Bacterial sinusitis [J32.9, B96.89]  1. Bacterial sinusitis  amoxicillin-clavulanate (AUGMENTIN) 875-125 mg per tablet      2. Bacterial conjunctivitis of left eye  tobramycin (TOBREX) 0.3 % SOLN      3. Non-recurrent acute serous otitis media of right ear  ofloxacin (FLOXIN) 0.3 % otic solution          Olman was seen today for earache.    Diagnoses and all orders for this visit:    Bacterial sinusitis  -     amoxicillin-clavulanate (AUGMENTIN) 875-125 mg per tablet; Take 1 tablet by mouth every 12 (twelve) hours for 7 days    Bacterial conjunctivitis of left eye  -     tobramycin (TOBREX) 0.3 % SOLN; Administer 1 drop into the left eye every 4 (four) hours while awake for 7 days    Non-recurrent acute serous otitis media of right ear  -     ofloxacin (FLOXIN) 0.3 % otic solution; Administer 10 drops to the right ear 2 (two) times a day        Patient Instructions     Patient Instructions   Take zyrtec or allegra daily  Use flonase 1-2 sprays in each nare daily   Use nasal saline to the nose,   Use humidifer in room  Symptoms worsen go to ER  Rest      Use ear drops to the ear only  Use EYE drops to the EYES only  Take oral antibiotic as directed     Symptoms worsen go to ER and call pcp    Proceed to the nearest ER if symptoms worsen, Follow up with your PCP  Continue to social distance, wash your hands, and wear your masks. Please continue to follow the CDC.gov guidelines daily for they are subject to change on COVID-19    Chief Complaint     Chief Complaint   Patient presents with    Earache     One day ago started with right ear \"clogged\" , states can't hear. Left eye crusted over started today.          History of Present Illness     64 yr old male " here today for right ear pain, left eye discomfort and redness. Pt was also stung by a bee yesterday            Review of Systems   Review of Systems   HENT:  Positive for congestion and ear pain. Negative for sinus pressure, sneezing and sore throat.    Eyes:  Positive for pain, discharge and redness. Negative for photophobia, itching and visual disturbance.   Respiratory:  Negative for cough.    Cardiovascular: Negative.    Skin:  Positive for wound (bee sting to the left arm and right side of neck, no respiratory distress, small area of swelling).         Current Medications       Current Outpatient Medications:     amoxicillin-clavulanate (AUGMENTIN) 875-125 mg per tablet, Take 1 tablet by mouth every 12 (twelve) hours for 7 days, Disp: 14 tablet, Rfl: 0    ofloxacin (FLOXIN) 0.3 % otic solution, Administer 10 drops to the right ear 2 (two) times a day, Disp: 5 mL, Rfl: 0    tobramycin (TOBREX) 0.3 % SOLN, Administer 1 drop into the left eye every 4 (four) hours while awake for 7 days, Disp: 1.8 mL, Rfl: 0    DULoxetine (CYMBALTA) 60 mg delayed release capsule, Take 1 capsule (60 mg total) by mouth daily, Disp: 90 capsule, Rfl: 3    Ibuprofen 200 MG CAPS, Take by mouth as needed, Disp: , Rfl:     Melatonin 3-10 MG TABS, Take 3 mg by mouth At bedtime taking 6mg as needed, Disp: , Rfl:     OXcarbazepine (TRILEPTAL) 300 mg tablet, TAKE 3 TABLETS IN THE MORNING AND 3 TABLETS AT NIGHT, Disp: 540 tablet, Rfl: 3    Current Facility-Administered Medications:     lidocaine-epinephrine (XYLOCAINE/EPINEPHRINE) 1 %-1:100,000 injection 30 mL, 30 mL, Infiltration, Once, Jarad Briones MD    Current Allergies     Allergies as of 09/15/2024    (No Known Allergies)              Past Medical History:   Diagnosis Date    Anxiety     Arthritis     RH    Carpal tunnel syndrome, bilateral     correction both today 3/19/2021    Epilepsy (HCC)     last seizure approx last March 2020/was off meds for awhile//but resumed taking them and  no problems since    Exercises daily     also walks alot at work    Seizure disorder (HCC)     Wrist pain     bilat occas       Past Surgical History:   Procedure Laterality Date    APPENDECTOMY      age6    CATARACT EXTRACTION Bilateral     COLONOSCOPY  2010    normal    COLONOSCOPY  02/2021    EGD  08/2019    H. pylori gastritis, treated with ampicillin, clarithromycin and omeprazole    FOOT SURGERY Right     pin and 3 screws implanted    CA NEUROPLASTY &/TRANSPOS MEDIAN NRV CARPAL TUNNE Bilateral 3/19/2021    Procedure: Carpal tunnel release;  Surgeon: Jarad Briones MD;  Location: [facility];  Service: Orthopedics    WRIST SURGERY Left        Family History   Problem Relation Age of Onset    No Known Problems Mother     No Known Problems Father     Diabetes Maternal Aunt     Breast cancer Sister     Colon cancer Neg Hx          Medications have been verified.    The following portions of the patient's history were reviewed and updated as appropriate: allergies, current medications, past family history, past medical history, past social history, past surgical history and problem list.    Objective   /83 (BP Location: Left arm, Patient Position: Sitting, Cuff Size: Standard)   Pulse 63   Temp 98 °F (36.7 °C) (Tympanic)   Resp 16   SpO2 97%      Physical Exam     Physical Exam  Constitutional:       General: He is not in acute distress.     Appearance: Normal appearance.   HENT:      Head: Normocephalic.      Right Ear: No decreased hearing noted. Drainage, swelling and tenderness present. There is no impacted cerumen. No foreign body. No mastoid tenderness.      Left Ear: Hearing, tympanic membrane, ear canal and external ear normal. There is no impacted cerumen.      Nose: Nose normal. No congestion or rhinorrhea.      Mouth/Throat:      Mouth: Mucous membranes are moist.      Pharynx: No oropharyngeal exudate or posterior oropharyngeal erythema.   Eyes:      General:         Right eye: No foreign  "body, discharge or hordeolum.         Left eye: Discharge present.No foreign body or hordeolum.      Extraocular Movements:      Right eye: Normal extraocular motion and no nystagmus.      Left eye: Normal extraocular motion and no nystagmus.      Conjunctiva/sclera:      Left eye: Left conjunctiva is injected.      Comments: Left eye conjunctivae is swollen and red, discharged present, pts left eye was crusted shut and tight when he woke up this morning    Neck:      Thyroid: No thyroid mass.        Comments: Small area of swelling from beesting from 1 day ago  Cardiovascular:      Rate and Rhythm: Normal rate and regular rhythm.   Pulmonary:      Effort: Pulmonary effort is normal.      Breath sounds: Normal breath sounds and air entry. No decreased breath sounds, wheezing or rhonchi.   Musculoskeletal:      Cervical back: Full passive range of motion without pain.   Lymphadenopathy:      Cervical: No cervical adenopathy.   Skin:            Comments: Area of swelling from a bee sting, minimal swelling and redness noted.    Neurological:      Mental Status: He is alert.               Note: Portions of this record may have been created with voice recognition software. Occasional wrong word or \"sound a like\" substitutions may have occurred due to the inherent limitations of voice recognition software. Please read the chart carefully and recognize, using context, where substitutions have occurred.*    CHRISTA Ruiz  "

## 2024-12-05 DIAGNOSIS — G40.209 EPILEPSY WITH PARTIAL COMPLEX SEIZURES, WITHOUT STATUS EPILEPTICUS (HCC): ICD-10-CM

## 2024-12-05 DIAGNOSIS — R43.0 ANOSMIA: ICD-10-CM

## 2024-12-05 DIAGNOSIS — R25.1 TREMOR: ICD-10-CM

## 2024-12-05 DIAGNOSIS — R68.89 SPELLS OF DECREASED ATTENTIVENESS: ICD-10-CM

## 2024-12-05 DIAGNOSIS — R42 DIZZINESS: ICD-10-CM

## 2024-12-05 DIAGNOSIS — F41.9 ANXIETY: ICD-10-CM

## 2024-12-06 RX ORDER — OXCARBAZEPINE 300 MG/1
TABLET, FILM COATED ORAL
Qty: 540 TABLET | Refills: 1 | Status: SHIPPED | OUTPATIENT
Start: 2024-12-06

## 2024-12-06 RX ORDER — DULOXETIN HYDROCHLORIDE 60 MG/1
60 CAPSULE, DELAYED RELEASE ORAL DAILY
Qty: 90 CAPSULE | Refills: 1 | Status: SHIPPED | OUTPATIENT
Start: 2024-12-06

## 2025-01-01 DIAGNOSIS — R25.1 TREMOR: ICD-10-CM

## 2025-01-01 DIAGNOSIS — G40.209 EPILEPSY WITH PARTIAL COMPLEX SEIZURES, WITHOUT STATUS EPILEPTICUS (HCC): ICD-10-CM

## 2025-01-01 DIAGNOSIS — R43.0 ANOSMIA: ICD-10-CM

## 2025-01-01 DIAGNOSIS — F41.9 ANXIETY: ICD-10-CM

## 2025-01-01 DIAGNOSIS — R68.89 SPELLS OF DECREASED ATTENTIVENESS: ICD-10-CM

## 2025-01-01 DIAGNOSIS — R42 DIZZINESS: ICD-10-CM

## 2025-01-02 RX ORDER — OXCARBAZEPINE 300 MG/1
TABLET, FILM COATED ORAL
Qty: 180 TABLET | Refills: 0 | Status: SHIPPED | OUTPATIENT
Start: 2025-01-02

## 2025-01-03 ENCOUNTER — TELEPHONE (OUTPATIENT)
Age: 65
End: 2025-01-03

## 2025-01-03 NOTE — TELEPHONE ENCOUNTER
Patient called requesting refill for oxycarbazepine 300 mg. Patient made aware medication was refilled on 1/2/25 for 180 with 0 refills to   [facility] pharmacy. Patient instructed to contact the pharmacy to obtain refills of medication. Patient verbalized understanding.      Patient called to request a refill for their duloxetine advised a refill was requested on 1/2/25 and is pending approval. Patient verbalized understanding and is in agreement.

## 2025-01-07 ENCOUNTER — OFFICE VISIT (OUTPATIENT)
Dept: FAMILY MEDICINE CLINIC | Facility: CLINIC | Age: 65
End: 2025-01-07
Payer: COMMERCIAL

## 2025-01-07 VITALS
HEART RATE: 60 BPM | HEIGHT: 68 IN | DIASTOLIC BLOOD PRESSURE: 68 MMHG | TEMPERATURE: 97.8 F | SYSTOLIC BLOOD PRESSURE: 130 MMHG | BODY MASS INDEX: 29.4 KG/M2 | WEIGHT: 194 LBS | OXYGEN SATURATION: 99 %

## 2025-01-07 DIAGNOSIS — Z23 ENCOUNTER FOR IMMUNIZATION: ICD-10-CM

## 2025-01-07 DIAGNOSIS — G40.209 EPILEPSY WITH PARTIAL COMPLEX SEIZURES, WITHOUT STATUS EPILEPTICUS (HCC): ICD-10-CM

## 2025-01-07 DIAGNOSIS — Z01.00 NORMAL EYE EXAM: ICD-10-CM

## 2025-01-07 DIAGNOSIS — Z00.00 MEDICARE ANNUAL WELLNESS VISIT, INITIAL: ICD-10-CM

## 2025-01-07 DIAGNOSIS — Z00.00 WELCOME TO MEDICARE PREVENTIVE VISIT: Primary | ICD-10-CM

## 2025-01-07 PROCEDURE — G0402 INITIAL PREVENTIVE EXAM: HCPCS | Performed by: FAMILY MEDICINE

## 2025-01-07 PROCEDURE — 99173 VISUAL ACUITY SCREEN: CPT | Performed by: FAMILY MEDICINE

## 2025-01-07 RX ORDER — DULOXETIN HYDROCHLORIDE 60 MG/1
60 CAPSULE, DELAYED RELEASE ORAL DAILY
Qty: 30 CAPSULE | Refills: 2 | Status: SHIPPED | OUTPATIENT
Start: 2025-01-07

## 2025-01-07 NOTE — PATIENT INSTRUCTIONS
Medicare Preventive Visit Patient Instructions  Thank you for completing your Welcome to Medicare Visit or Medicare Annual Wellness Visit today. Your next wellness visit will be due in one year (1/8/2026).  The screening/preventive services that you may require over the next 5-10 years are detailed below. Some tests may not apply to you based off risk factors and/or age. Screening tests ordered at today's visit but not completed yet may show as past due. Also, please note that scanned in results may not display below.  Preventive Screenings:  Service Recommendations Previous Testing/Comments   Colorectal Cancer Screening  Colonoscopy    Fecal Occult Blood Test (FOBT)/Fecal Immunochemical Test (FIT)  Fecal DNA/Cologuard Test  Flexible Sigmoidoscopy Age: 45-75 years old   Colonoscopy: every 10 years (May be performed more frequently if at higher risk)  OR  FOBT/FIT: every 1 year  OR  Cologuard: every 3 years  OR  Sigmoidoscopy: every 5 years  Screening may be recommended earlier than age 45 if at higher risk for colorectal cancer. Also, an individualized decision between you and your healthcare provider will decide whether screening between the ages of 76-85 would be appropriate. Colonoscopy: 01/15/2021  FOBT/FIT: Not on file  Cologuard: Not on file  Sigmoidoscopy: Not on file    Screening Current     Prostate Cancer Screening Individualized decision between patient and health care provider in men between ages of 55-69   Medicare will cover every 12 months beginning on the day after your 50th birthday PSA: 0.433 ng/mL     Screening Current     Hepatitis C Screening Once for adults born between 1945 and 1965  More frequently in patients at high risk for Hepatitis C Hep C Antibody: Not on file        Diabetes Screening 1-2 times per year if you're at risk for diabetes or have pre-diabetes Fasting glucose: 141 mg/dL (8/26/2024)  A1C: 5.6 % (8/26/2024)  Screening Current   Cholesterol Screening Once every 5 years if you  don't have a lipid disorder. May order more often based on risk factors. Lipid panel: 08/26/2024  Screening Current      Other Preventive Screenings Covered by Medicare:  Abdominal Aortic Aneurysm (AAA) Screening: covered once if your at risk. You're considered to be at risk if you have a family history of AAA or a male between the age of 65-75 who smoking at least 100 cigarettes in your lifetime.  Lung Cancer Screening: covers low dose CT scan once per year if you meet all of the following conditions: (1) Age 55-77; (2) No signs or symptoms of lung cancer; (3) Current smoker or have quit smoking within the last 15 years; (4) You have a tobacco smoking history of at least 20 pack years (packs per day x number of years you smoked); (5) You get a written order from a healthcare provider.  Glaucoma Screening: covered annually if you're considered high risk: (1) You have diabetes OR (2) Family history of glaucoma OR (3)  aged 50 and older OR (4)  American aged 65 and older  Osteoporosis Screening: covered every 2 years if you meet one of the following conditions: (1) Have a vertebral abnormality; (2) On glucocorticoid therapy for more than 3 months; (3) Have primary hyperparathyroidism; (4) On osteoporosis medications and need to assess response to drug therapy.  HIV Screening: covered annually if you're between the age of 15-65. Also covered annually if you are younger than 15 and older than 65 with risk factors for HIV infection. For pregnant patients, it is covered up to 3 times per pregnancy.    Immunizations:  Immunization Recommendations   Influenza Vaccine Annual influenza vaccination during flu season is recommended for all persons aged >= 6 months who do not have contraindications   Pneumococcal Vaccine   * Pneumococcal conjugate vaccine = PCV13 (Prevnar 13), PCV15 (Vaxneuvance), PCV20 (Prevnar 20)  * Pneumococcal polysaccharide vaccine = PPSV23 (Pneumovax) Adults 19-65 yo with certain  risk factors or if 65+ yo  If never received any pneumonia vaccine: recommend Prevnar 20 (PCV20)  Give PCV20 if previously received 1 dose of PCV13 or PPSV23   Hepatitis B Vaccine 3 dose series if at intermediate or high risk (ex: diabetes, end stage renal disease, liver disease)   Respiratory syncytial virus (RSV) Vaccine - COVERED BY MEDICARE PART D  * RSVPreF3 (Arexvy) CDC recommends that adults 60 years of age and older may receive a single dose of RSV vaccine using shared clinical decision-making (SCDM)   Tetanus (Td) Vaccine - COST NOT COVERED BY MEDICARE PART B Following completion of primary series, a booster dose should be given every 10 years to maintain immunity against tetanus. Td may also be given as tetanus wound prophylaxis.   Tdap Vaccine - COST NOT COVERED BY MEDICARE PART B Recommended at least once for all adults. For pregnant patients, recommended with each pregnancy.   Shingles Vaccine (Shingrix) - COST NOT COVERED BY MEDICARE PART B  2 shot series recommended in those 19 years and older who have or will have weakened immune systems or those 50 years and older     Health Maintenance Due:      Topic Date Due   • Hepatitis C Screening  Never done   • HIV Screening  Never done   • Lung Cancer Screening  11/12/2020   • Colorectal Cancer Screening  01/15/2031     Immunizations Due:      Topic Date Due   • Pneumococcal Vaccine: 65+ Years (1 of 2 - PCV) Never done   • Influenza Vaccine (1) 09/01/2024   • COVID-19 Vaccine (5 - 2024-25 season) 09/01/2024     Advance Directives   What are advance directives?  Advance directives are legal documents that state your wishes and plans for medical care. These plans are made ahead of time in case you lose your ability to make decisions for yourself. Advance directives can apply to any medical decision, such as the treatments you want, and if you want to donate organs.   What are the types of advance directives?  There are many types of advance directives, and  each state has rules about how to use them. You may choose a combination of any of the following:  Living will:  This is a written record of the treatment you want. You can also choose which treatments you do not want, which to limit, and which to stop at a certain time. This includes surgery, medicine, IV fluid, and tube feedings.   Durable power of  for healthcare (DPAHC):  This is a written record that states who you want to make healthcare choices for you when you are unable to make them for yourself. This person, called a proxy, is usually a family member or a friend. You may choose more than 1 proxy.  Do not resuscitate (DNR) order:  A DNR order is used in case your heart stops beating or you stop breathing. It is a request not to have certain forms of treatment, such as CPR. A DNR order may be included in other types of advance directives.  Medical directive:  This covers the care that you want if you are in a coma, near death, or unable to make decisions for yourself. You can list the treatments you want for each condition. Treatment may include pain medicine, surgery, blood transfusions, dialysis, IV or tube feedings, and a ventilator (breathing machine).  Values history:  This document has questions about your views, beliefs, and how you feel and think about life. This information can help others choose the care that you would choose.  Why are advance directives important?  An advance directive helps you control your care. Although spoken wishes may be used, it is better to have your wishes written down. Spoken wishes can be misunderstood, or not followed. Treatments may be given even if you do not want them. An advance directive may make it easier for your family to make difficult choices about your care.   Fall Prevention    Fall prevention  includes ways to make your home and other areas safer. It also includes ways you can move more carefully to prevent a fall. Health conditions that cause  changes in your blood pressure, vision, or muscle strength and coordination may increase your risk for falls. Medicines may also increase your risk for falls if they make you dizzy, weak, or sleepy.   Fall prevention tips:   Stand or sit up slowly.    Use assistive devices as directed.    Wear shoes that fit well and have soles that .    Wear a personal alarm.    Stay active.    Manage your medical conditions.    Home Safety Tips:  Add items to prevent falls in the bathroom.    Keep paths clear.    Install bright lights in your home.    Keep items you use often on shelves within reach.    Paint or place reflective tape on the edges of your stairs.    Cigarette Smoking and Your Health   Risks to your health if you smoke:  Nicotine and other chemicals found in tobacco damage every cell in your body. Even if you are a light smoker, you have an increased risk for cancer, heart disease, and lung disease. If you are pregnant or have diabetes, smoking increases your risk for complications.   Benefits to your health if you stop smoking:   You decrease respiratory symptoms such as coughing, wheezing, and shortness of breath.   You reduce your risk for cancers of the lung, mouth, throat, kidney, bladder, pancreas, stomach, and cervix. If you already have cancer, you increase the benefits of chemotherapy. You also reduce your risk for cancer returning or a second cancer from developing.   You reduce your risk for heart disease, blood clots, heart attack, and stroke.   You reduce your risk for lung infections, and diseases such as pneumonia, asthma, chronic bronchitis, and emphysema.  Your circulation improves. More oxygen can be delivered to your body. If you have diabetes, you lower your risk for complications, such as kidney, artery, and eye diseases. You also lower your risk for nerve damage. Nerve damage can lead to amputations, poor vision, and blindness.  You improve your body's ability to heal and to fight  infections.  For more information and support to stop smoking:   [employer]gov  Phone: 5- 292 - 352-8062  Web Address: www.[employer]  Weight Management   Why it is important to manage your weight:  Being overweight increases your risk of health conditions such as heart disease, high blood pressure, type 2 diabetes, and certain types of cancer. It can also increase your risk for osteoarthritis, sleep apnea, and other respiratory problems. Aim for a slow, steady weight loss. Even a small amount of weight loss can lower your risk of health problems.  How to lose weight safely:  A safe and healthy way to lose weight is to eat fewer calories and get regular exercise. You can lose up about 1 pound a week by decreasing the number of calories you eat by 500 calories each day.   Healthy meal plan for weight management:  A healthy meal plan includes a variety of foods, contains fewer calories, and helps you stay healthy. A healthy meal plan includes the following:  Eat whole-grain foods more often.  A healthy meal plan should contain fiber. Fiber is the part of grains, fruits, and vegetables that is not broken down by your body. Whole-grain foods are healthy and provide extra fiber in your diet. Some examples of whole-grain foods are whole-wheat breads and pastas, oatmeal, brown rice, and bulgur.  Eat a variety of vegetables every day.  Include dark, leafy greens such as spinach, kale, vane greens, and mustard greens. Eat yellow and orange vegetables such as carrots, sweet potatoes, and winter squash.   Eat a variety of fruits every day.  Choose fresh or canned fruit (canned in its own juice or light syrup) instead of juice. Fruit juice has very little or no fiber.  Eat low-fat dairy foods.  Drink fat-free (skim) milk or 1% milk. Eat fat-free yogurt and low-fat cottage cheese. Try low-fat cheeses such as mozzarella and other reduced-fat cheeses.  Choose meat and other protein foods that are low in fat.  Choose beans or  other legumes such as split peas or lentils. Choose fish, skinless poultry (chicken or turkey), or lean cuts of red meat (beef or pork). Before you cook meat or poultry, cut off any visible fat.   Use less fat and oil.  Try baking foods instead of frying them. Add less fat, such as margarine, sour cream, regular salad dressing and mayonnaise to foods. Eat fewer high-fat foods. Some examples of high-fat foods include french fries, doughnuts, ice cream, and cakes.  Eat fewer sweets.  Limit foods and drinks that are high in sugar. This includes candy, cookies, regular soda, and sweetened drinks.  Exercise:  Exercise at least 30 minutes per day on most days of the week. Some examples of exercise include walking, biking, dancing, and swimming. You can also fit in more physical activity by taking the stairs instead of the elevator or parking farther away from stores. Ask your healthcare provider about the best exercise plan for you.      © Copyright [employer] 2018 Information is for End User's use only and may not be sold, redistributed or otherwise used for commercial purposes. All illustrations and images included in CareNotes® are the copyrighted property of [employer] or [employer]    Medicare Preventive Visit Patient Instructions  Thank you for completing your Welcome to Medicare Visit or Medicare Annual Wellness Visit today. Your next wellness visit will be due in one year (1/8/2026).  The screening/preventive services that you may require over the next 5-10 years are detailed below. Some tests may not apply to you based off risk factors and/or age. Screening tests ordered at today's visit but not completed yet may show as past due. Also, please note that scanned in results may not display below.  Preventive Screenings:  Service Recommendations Previous Testing/Comments   Colorectal Cancer Screening  Colonoscopy    Fecal Occult Blood Test (FOBT)/Fecal Immunochemical Test (FIT)  Fecal DNA/Cologuard  Test  Flexible Sigmoidoscopy Age: 45-75 years old   Colonoscopy: every 10 years (May be performed more frequently if at higher risk)  OR  FOBT/FIT: every 1 year  OR  Cologuard: every 3 years  OR  Sigmoidoscopy: every 5 years  Screening may be recommended earlier than age 45 if at higher risk for colorectal cancer. Also, an individualized decision between you and your healthcare provider will decide whether screening between the ages of 76-85 would be appropriate. Colonoscopy: 01/15/2021  FOBT/FIT: Not on file  Cologuard: Not on file  Sigmoidoscopy: Not on file    Screening Current     Prostate Cancer Screening Individualized decision between patient and health care provider in men between ages of 55-69   Medicare will cover every 12 months beginning on the day after your 50th birthday PSA: 0.433 ng/mL     Screening Current     Hepatitis C Screening Once for adults born between 1945 and 1965  More frequently in patients at high risk for Hepatitis C Hep C Antibody: Not on file        Diabetes Screening 1-2 times per year if you're at risk for diabetes or have pre-diabetes Fasting glucose: 141 mg/dL (8/26/2024)  A1C: 5.6 % (8/26/2024)  Screening Current   Cholesterol Screening Once every 5 years if you don't have a lipid disorder. May order more often based on risk factors. Lipid panel: 08/26/2024  Screening Current      Other Preventive Screenings Covered by Medicare:  Abdominal Aortic Aneurysm (AAA) Screening: covered once if your at risk. You're considered to be at risk if you have a family history of AAA or a male between the age of 65-75 who smoking at least 100 cigarettes in your lifetime.  Lung Cancer Screening: covers low dose CT scan once per year if you meet all of the following conditions: (1) Age 55-77; (2) No signs or symptoms of lung cancer; (3) Current smoker or have quit smoking within the last 15 years; (4) You have a tobacco smoking history of at least 20 pack years (packs per day x number of years  you smoked); (5) You get a written order from a healthcare provider.  Glaucoma Screening: covered annually if you're considered high risk: (1) You have diabetes OR (2) Family history of glaucoma OR (3)  aged 50 and older OR (4)  American aged 65 and older  Osteoporosis Screening: covered every 2 years if you meet one of the following conditions: (1) Have a vertebral abnormality; (2) On glucocorticoid therapy for more than 3 months; (3) Have primary hyperparathyroidism; (4) On osteoporosis medications and need to assess response to drug therapy.  HIV Screening: covered annually if you're between the age of 15-65. Also covered annually if you are younger than 15 and older than 65 with risk factors for HIV infection. For pregnant patients, it is covered up to 3 times per pregnancy.    Immunizations:  Immunization Recommendations   Influenza Vaccine Annual influenza vaccination during flu season is recommended for all persons aged >= 6 months who do not have contraindications   Pneumococcal Vaccine   * Pneumococcal conjugate vaccine = PCV13 (Prevnar 13), PCV15 (Vaxneuvance), PCV20 (Prevnar 20)  * Pneumococcal polysaccharide vaccine = PPSV23 (Pneumovax) Adults 19-63 yo with certain risk factors or if 65+ yo  If never received any pneumonia vaccine: recommend Prevnar 20 (PCV20)  Give PCV20 if previously received 1 dose of PCV13 or PPSV23   Hepatitis B Vaccine 3 dose series if at intermediate or high risk (ex: diabetes, end stage renal disease, liver disease)   Respiratory syncytial virus (RSV) Vaccine - COVERED BY MEDICARE PART D  * RSVPreF3 (Arexvy) CDC recommends that adults 60 years of age and older may receive a single dose of RSV vaccine using shared clinical decision-making (SCDM)   Tetanus (Td) Vaccine - COST NOT COVERED BY MEDICARE PART B Following completion of primary series, a booster dose should be given every 10 years to maintain immunity against tetanus. Td may also be given as  tetanus wound prophylaxis.   Tdap Vaccine - COST NOT COVERED BY MEDICARE PART B Recommended at least once for all adults. For pregnant patients, recommended with each pregnancy.   Shingles Vaccine (Shingrix) - COST NOT COVERED BY MEDICARE PART B  2 shot series recommended in those 19 years and older who have or will have weakened immune systems or those 50 years and older     Health Maintenance Due:      Topic Date Due   • Hepatitis C Screening  Never done   • HIV Screening  Never done   • Lung Cancer Screening  11/12/2020   • Colorectal Cancer Screening  01/15/2031     Immunizations Due:      Topic Date Due   • Pneumococcal Vaccine: 65+ Years (1 of 2 - PCV) Never done   • Influenza Vaccine (1) 09/01/2024   • COVID-19 Vaccine (5 - 2024-25 season) 09/01/2024     Advance Directives   What are advance directives?  Advance directives are legal documents that state your wishes and plans for medical care. These plans are made ahead of time in case you lose your ability to make decisions for yourself. Advance directives can apply to any medical decision, such as the treatments you want, and if you want to donate organs.   What are the types of advance directives?  There are many types of advance directives, and each state has rules about how to use them. You may choose a combination of any of the following:  Living will:  This is a written record of the treatment you want. You can also choose which treatments you do not want, which to limit, and which to stop at a certain time. This includes surgery, medicine, IV fluid, and tube feedings.   Durable power of  for healthcare (DPAHC):  This is a written record that states who you want to make healthcare choices for you when you are unable to make them for yourself. This person, called a proxy, is usually a family member or a friend. You may choose more than 1 proxy.  Do not resuscitate (DNR) order:  A DNR order is used in case your heart stops beating or you stop  breathing. It is a request not to have certain forms of treatment, such as CPR. A DNR order may be included in other types of advance directives.  Medical directive:  This covers the care that you want if you are in a coma, near death, or unable to make decisions for yourself. You can list the treatments you want for each condition. Treatment may include pain medicine, surgery, blood transfusions, dialysis, IV or tube feedings, and a ventilator (breathing machine).  Values history:  This document has questions about your views, beliefs, and how you feel and think about life. This information can help others choose the care that you would choose.  Why are advance directives important?  An advance directive helps you control your care. Although spoken wishes may be used, it is better to have your wishes written down. Spoken wishes can be misunderstood, or not followed. Treatments may be given even if you do not want them. An advance directive may make it easier for your family to make difficult choices about your care.   Fall Prevention    Fall prevention  includes ways to make your home and other areas safer. It also includes ways you can move more carefully to prevent a fall. Health conditions that cause changes in your blood pressure, vision, or muscle strength and coordination may increase your risk for falls. Medicines may also increase your risk for falls if they make you dizzy, weak, or sleepy.   Fall prevention tips:   Stand or sit up slowly.    Use assistive devices as directed.    Wear shoes that fit well and have soles that .    Wear a personal alarm.    Stay active.    Manage your medical conditions.    Home Safety Tips:  Add items to prevent falls in the bathroom.    Keep paths clear.    Install bright lights in your home.    Keep items you use often on shelves within reach.    Paint or place reflective tape on the edges of your stairs.    Cigarette Smoking and Your Health   Risks to your health if  you smoke:  Nicotine and other chemicals found in tobacco damage every cell in your body. Even if you are a light smoker, you have an increased risk for cancer, heart disease, and lung disease. If you are pregnant or have diabetes, smoking increases your risk for complications.   Benefits to your health if you stop smoking:   You decrease respiratory symptoms such as coughing, wheezing, and shortness of breath.   You reduce your risk for cancers of the lung, mouth, throat, kidney, bladder, pancreas, stomach, and cervix. If you already have cancer, you increase the benefits of chemotherapy. You also reduce your risk for cancer returning or a second cancer from developing.   You reduce your risk for heart disease, blood clots, heart attack, and stroke.   You reduce your risk for lung infections, and diseases such as pneumonia, asthma, chronic bronchitis, and emphysema.  Your circulation improves. More oxygen can be delivered to your body. If you have diabetes, you lower your risk for complications, such as kidney, artery, and eye diseases. You also lower your risk for nerve damage. Nerve damage can lead to amputations, poor vision, and blindness.  You improve your body's ability to heal and to fight infections.  For more information and support to stop smoking:   [employer].gov  Phone: 1- 299 - 272-3481  Web Address: www.[employer]  Weight Management   Why it is important to manage your weight:  Being overweight increases your risk of health conditions such as heart disease, high blood pressure, type 2 diabetes, and certain types of cancer. It can also increase your risk for osteoarthritis, sleep apnea, and other respiratory problems. Aim for a slow, steady weight loss. Even a small amount of weight loss can lower your risk of health problems.  How to lose weight safely:  A safe and healthy way to lose weight is to eat fewer calories and get regular exercise. You can lose up about 1 pound a week by decreasing the  number of calories you eat by 500 calories each day.   Healthy meal plan for weight management:  A healthy meal plan includes a variety of foods, contains fewer calories, and helps you stay healthy. A healthy meal plan includes the following:  Eat whole-grain foods more often.  A healthy meal plan should contain fiber. Fiber is the part of grains, fruits, and vegetables that is not broken down by your body. Whole-grain foods are healthy and provide extra fiber in your diet. Some examples of whole-grain foods are whole-wheat breads and pastas, oatmeal, brown rice, and bulgur.  Eat a variety of vegetables every day.  Include dark, leafy greens such as spinach, kale, vane greens, and mustard greens. Eat yellow and orange vegetables such as carrots, sweet potatoes, and winter squash.   Eat a variety of fruits every day.  Choose fresh or canned fruit (canned in its own juice or light syrup) instead of juice. Fruit juice has very little or no fiber.  Eat low-fat dairy foods.  Drink fat-free (skim) milk or 1% milk. Eat fat-free yogurt and low-fat cottage cheese. Try low-fat cheeses such as mozzarella and other reduced-fat cheeses.  Choose meat and other protein foods that are low in fat.  Choose beans or other legumes such as split peas or lentils. Choose fish, skinless poultry (chicken or turkey), or lean cuts of red meat (beef or pork). Before you cook meat or poultry, cut off any visible fat.   Use less fat and oil.  Try baking foods instead of frying them. Add less fat, such as margarine, sour cream, regular salad dressing and mayonnaise to foods. Eat fewer high-fat foods. Some examples of high-fat foods include french fries, doughnuts, ice cream, and cakes.  Eat fewer sweets.  Limit foods and drinks that are high in sugar. This includes candy, cookies, regular soda, and sweetened drinks.  Exercise:  Exercise at least 30 minutes per day on most days of the week. Some examples of exercise include walking, biking,  dancing, and swimming. You can also fit in more physical activity by taking the stairs instead of the elevator or parking farther away from stores. Ask your healthcare provider about the best exercise plan for you.      © Copyright [employer] 2018 Information is for End User's use only and may not be sold, redistributed or otherwise used for commercial purposes. All illustrations and images included in CareNotes® are the copyrighted property of [employer] Inc[employer] or [employer]

## 2025-01-07 NOTE — TELEPHONE ENCOUNTER
"Duloxetine script did not go through and is still \"pending\". Patient is completley out of medication wife reports he really needs it and they have been trying for a few days to get it now.. Can you resend to [facility]?   "

## 2025-01-07 NOTE — PROGRESS NOTES
Name: Olman Merlos      : 1960      MRN: [id number]  Encounter Provider: Bhavani Whalen DO  Encounter Date: 2025   Encounter department: [facility]    Assessment & Plan  Welcome to Medicare preventive visit         Encounter for immunization         Epilepsy with partial complex seizures, without status epilepticus (HCC)         Normal eye exam            Preventive health issues were discussed with patient, and age appropriate screening tests were ordered as noted in patient's After Visit Summary. Personalized health advice and appropriate referrals for health education or preventive services given if needed, as noted in patient's After Visit Summary.    History of Present Illness     HPI   Patient Care Team:  Bhavani Whalen DO as PCP - General  Bhavani Whalen DO as PCP - PCP-[facility] Attributed-Roster    Review of Systems   Constitutional:  Negative for activity change, chills, fatigue and fever.   HENT:  Negative for congestion, ear pain, sinus pressure and sore throat.    Eyes:  Negative for redness, itching and visual disturbance.   Respiratory:  Negative for cough and shortness of breath.    Cardiovascular:  Negative for chest pain and palpitations.   Gastrointestinal:  Negative for abdominal pain, diarrhea and nausea.   Endocrine: Negative for cold intolerance and heat intolerance.   Genitourinary:  Negative for dysuria, flank pain and frequency.   Musculoskeletal:  Negative for arthralgias, back pain, gait problem and myalgias.   Skin:  Negative for color change.   Allergic/Immunologic: Negative for environmental allergies.   Neurological:  Negative for dizziness, numbness and headaches.   Psychiatric/Behavioral:  Negative for behavioral problems and sleep disturbance.      Medical History Reviewed by provider this encounter:  Tobacco  Allergies  Meds  Problems  Med Hx  Surg Hx  Fam Hx       Annual Wellness Visit Questionnaire   Olman is here for his  Welcome to Medicare visit.     Health Risk Assessment:   Patient rates overall health as very good. Patient feels that their physical health rating is same. Patient is satisfied with their life. Eyesight was rated as same. Hearing was rated as same. Patient feels that their emotional and mental health rating is same. Patients states they are sometimes angry. Patient states they are sometimes unusually tired/fatigued. Pain experienced in the last 7 days has been some. Patient's pain rating has been 3/10. Patient states that he has experienced no weight loss or gain in last 6 months.     Depression Screening:   PHQ-2 Score: 0      Fall Risk Screening:   In the past year, patient has experienced: history of falling in past year    Number of falls: 1  Injured during fall?: No    Feels unsteady when standing or walking?: No    Worried about falling?: No      Home Safety:  Patient does not have trouble with stairs inside or outside of their home. Patient has working smoke alarms and has working carbon monoxide detector. Home safety hazards include: none.     Nutrition:   Current diet is Regular.     Medications:   Patient is currently taking over-the-counter supplements. OTC medications include: see medication list. Patient is able to manage medications.     Activities of Daily Living (ADLs)/Instrumental Activities of Daily Living (IADLs):   Walk and transfer into and out of bed and chair?: Yes  Dress and groom yourself?: Yes    Bathe or shower yourself?: Yes    Feed yourself? Yes  Do your laundry/housekeeping?: Yes  Manage your money, pay your bills and track your expenses?: Yes  Make your own meals?: Yes    Do your own shopping?: Yes    Previous Hospitalizations:   Any hospitalizations or ED visits within the last 12 months?: No      Advance Care Planning:   Living will: No    Durable POA for healthcare: No    Advanced directive: No    Advanced directive counseling given: Yes    ACP document given: Yes    Patient  "declined ACP directive: No    End of Life Decisions reviewed with patient: Yes    Provider agrees with end of life decisions: Yes      Cognitive Screening:   Provider or family/friend/caregiver concerned regarding cognition?: No    PREVENTIVE SCREENINGS      Cardiovascular Screening:    General: Screening Current and Risks and Benefits Discussed      Diabetes Screening:     General: Screening Current and Risks and Benefits Discussed      Colorectal Cancer Screening:     General: Screening Current      Prostate Cancer Screening:    General: Screening Current and Risks and Benefits Discussed      Osteoporosis Screening:    General: Risks and Benefits Discussed and Screening Not Indicated      Abdominal Aortic Aneurysm (AAA) Screening:    Risk factors include: age between 65-74 yo and tobacco use        General: Risks and Benefits Discussed and Patient Declines      Lung Cancer Screening:     General: Risks and Benefits Discussed and Patient Declines      Hepatitis C Screening:    General: Risks and Benefits Discussed    Hep C Screening Accepted: Yes      Screening, Brief Intervention, and Referral to Treatment (SBIRT)    Screening  Typical number of drinks in a day: 0  Typical number of drinks in a week: 0  Interpretation: Low risk drinking behavior.    Single Item Drug Screening:  How often have you used an illegal drug (including marijuana) or a prescription medication for non-medical reasons in the past year? never    Single Item Drug Screen Score: 0  Interpretation: Negative screen for possible drug use disorder       No results found.    Objective   /68 (BP Location: Left arm, Patient Position: Sitting, Cuff Size: Standard)   Pulse 60   Temp 97.8 °F (36.6 °C) (Temporal)   Ht 5' 7.5\" (1.715 m)   Wt 88 kg (194 lb)   SpO2 99%   BMI 29.94 kg/m²     Physical Exam  Vitals reviewed.   Constitutional:       General: He is not in acute distress.     Appearance: Normal appearance. He is well-developed.   HENT: "      Head: Normocephalic and atraumatic.      Right Ear: Tympanic membrane, ear canal and external ear normal. There is no impacted cerumen.      Left Ear: Tympanic membrane, ear canal and external ear normal. There is no impacted cerumen.      Nose: Nose normal. No congestion or rhinorrhea.      Mouth/Throat:      Mouth: Mucous membranes are moist.      Pharynx: No oropharyngeal exudate or posterior oropharyngeal erythema.   Eyes:      General: No scleral icterus.        Right eye: No discharge.         Left eye: No discharge.      Extraocular Movements: Extraocular movements intact.      Conjunctiva/sclera: Conjunctivae normal.      Pupils: Pupils are equal, round, and reactive to light.   Neck:      Trachea: No tracheal deviation.   Cardiovascular:      Rate and Rhythm: Normal rate and regular rhythm.      Pulses: Normal pulses.           Dorsalis pedis pulses are 2+ on the right side and 2+ on the left side.        Posterior tibial pulses are 2+ on the right side and 2+ on the left side.      Heart sounds: Normal heart sounds. No murmur heard.     No friction rub. No gallop.   Pulmonary:      Effort: Pulmonary effort is normal. No respiratory distress.      Breath sounds: Normal breath sounds. No wheezing, rhonchi or rales.   Abdominal:      General: Bowel sounds are normal. There is no distension.      Palpations: Abdomen is soft.      Tenderness: There is no abdominal tenderness. There is no guarding or rebound.   Musculoskeletal:         General: Normal range of motion.      Cervical back: Normal range of motion and neck supple.      Right lower leg: No edema.      Left lower leg: No edema.   Lymphadenopathy:      Head:      Right side of head: No submental or submandibular adenopathy.      Left side of head: No submental or submandibular adenopathy.      Cervical: No cervical adenopathy.      Right cervical: No superficial, deep or posterior cervical adenopathy.     Left cervical: No superficial, deep or  posterior cervical adenopathy.   Skin:     General: Skin is warm and dry.      Findings: No erythema.   Neurological:      General: No focal deficit present.      Mental Status: He is alert and oriented to person, place, and time.      Cranial Nerves: No cranial nerve deficit.      Sensory: Sensation is intact. No sensory deficit.      Motor: Motor function is intact.   Psychiatric:         Attention and Perception: Attention and perception normal.         Mood and Affect: Mood is not anxious or depressed.         Speech: Speech normal.         Behavior: Behavior normal.         Thought Content: Thought content normal.         Judgment: Judgment normal.

## 2025-01-07 NOTE — PROGRESS NOTES
Name: Olman Merlos      : 1960      MRN: [id number]  Encounter Provider: Bhavani Whalen DO  Encounter Date: 2025   Encounter department: [facility]    Assessment & Plan  Encounter for immunization    Orders:  •  Pneumococcal Conjugate Vaccine 20-valent (Pcv20)    Epilepsy with partial complex seizures, without status epilepticus (HCC)         Welcome to Medicare preventive visit            Preventive health issues were discussed with patient, and age appropriate screening tests were ordered as noted in patient's After Visit Summary. Personalized health advice and appropriate referrals for health education or preventive services given if needed, as noted in patient's After Visit Summary.    History of Present Illness   {?Quick Links Encounters * My Last Note * Last Note in Specialty * Snapshot * Since Last Visit * History :32408}  HPI   Patient Care Team:  Bhavani Whalen DO as PCP - General  Bhavani Whalen DO as PCP - PCP-[facility] Attributed-Roster    Review of Systems   Constitutional:  Negative for activity change, chills, fatigue and fever.   HENT:  Negative for congestion, ear pain, sinus pressure and sore throat.    Eyes:  Negative for redness, itching and visual disturbance.   Respiratory:  Negative for cough and shortness of breath.    Cardiovascular:  Negative for chest pain and palpitations.   Gastrointestinal:  Negative for abdominal pain, diarrhea and nausea.   Endocrine: Negative for cold intolerance and heat intolerance.   Genitourinary:  Negative for dysuria, flank pain and frequency.   Musculoskeletal:  Negative for arthralgias, back pain, gait problem and myalgias.   Skin:  Negative for color change.   Allergic/Immunologic: Negative for environmental allergies.   Neurological:  Negative for dizziness, numbness and headaches.   Psychiatric/Behavioral:  Negative for behavioral problems and sleep disturbance.      Medical History Reviewed by provider this  "encounter:       Annual Wellness Visit Questionnaire   Olman is here for his Welcome to Medicare visit.     Depression Screening:   PHQ-2 Score: 0      Advance Care Planning:   Living will: No    Durable POA for healthcare: No    Advanced directive: No    Advanced directive counseling given: Yes    ACP document given: Yes    Patient declined ACP directive: No    End of Life Decisions reviewed with patient: Yes    Provider agrees with end of life decisions: Yes      Cognitive Screening:   Provider or family/friend/caregiver concerned regarding cognition?: No    PREVENTIVE SCREENINGS      Cardiovascular Screening:    General: Screening Current and Risks and Benefits Discussed      Diabetes Screening:     General: Screening Current and Risks and Benefits Discussed      Colorectal Cancer Screening:     General: Screening Current      Prostate Cancer Screening:    General: Screening Current and Risks and Benefits Discussed      Osteoporosis Screening:    General: Screening Not Indicated and Risks and Benefits Discussed      Abdominal Aortic Aneurysm (AAA) Screening:    Risk factors include: age between 65-74 yo and tobacco use        General: Risks and Benefits Discussed and Patient Declines      Lung Cancer Screening:     General: Risks and Benefits Discussed and Screening Not Indicated      Hepatitis C Screening:    General: Risks and Benefits Discussed    Hep C Screening Accepted: Yes         No results found.    Objective {?Quick Links Trend Vitals * Enter New Vitals * Results Review * Timeline (Adult) * Labs * Imaging * Cardiology * Procedures * Lung Cancer Screening * Surgical eConsent :89349}  /68 (BP Location: Left arm, Patient Position: Sitting, Cuff Size: Standard)   Pulse 60   Temp 97.8 °F (36.6 °C) (Temporal)   Ht 5' 7.5\" (1.715 m)   Wt 88 kg (194 lb)   SpO2 99%   BMI 29.94 kg/m²     Physical Exam  Vitals reviewed.   Constitutional:       General: He is not in acute distress.     Appearance: Normal " appearance. He is well-developed.   HENT:      Head: Normocephalic and atraumatic.      Right Ear: Tympanic membrane, ear canal and external ear normal. There is no impacted cerumen.      Left Ear: Tympanic membrane, ear canal and external ear normal. There is no impacted cerumen.      Nose: Nose normal. No congestion or rhinorrhea.      Mouth/Throat:      Mouth: Mucous membranes are moist.      Pharynx: No oropharyngeal exudate or posterior oropharyngeal erythema.   Eyes:      General: No scleral icterus.        Right eye: No discharge.         Left eye: No discharge.      Extraocular Movements: Extraocular movements intact.      Conjunctiva/sclera: Conjunctivae normal.      Pupils: Pupils are equal, round, and reactive to light.   Neck:      Trachea: No tracheal deviation.   Cardiovascular:      Rate and Rhythm: Normal rate and regular rhythm.      Pulses: Normal pulses.           Dorsalis pedis pulses are 2+ on the right side and 2+ on the left side.        Posterior tibial pulses are 2+ on the right side and 2+ on the left side.      Heart sounds: Normal heart sounds. No murmur heard.     No friction rub. No gallop.   Pulmonary:      Effort: Pulmonary effort is normal. No respiratory distress.      Breath sounds: Normal breath sounds. No wheezing, rhonchi or rales.   Abdominal:      General: Bowel sounds are normal. There is no distension.      Palpations: Abdomen is soft.      Tenderness: There is no abdominal tenderness. There is no guarding or rebound.   Musculoskeletal:         General: Normal range of motion.      Cervical back: Normal range of motion and neck supple.      Right lower leg: No edema.      Left lower leg: No edema.   Lymphadenopathy:      Head:      Right side of head: No submental or submandibular adenopathy.      Left side of head: No submental or submandibular adenopathy.      Cervical: No cervical adenopathy.      Right cervical: No superficial, deep or posterior cervical adenopathy.      Left cervical: No superficial, deep or posterior cervical adenopathy.   Skin:     General: Skin is warm and dry.      Findings: No erythema.   Neurological:      General: No focal deficit present.      Mental Status: He is alert and oriented to person, place, and time.      Cranial Nerves: No cranial nerve deficit.      Sensory: Sensation is intact. No sensory deficit.      Motor: Motor function is intact.   Psychiatric:         Attention and Perception: Attention and perception normal.         Mood and Affect: Mood is not anxious or depressed.         Speech: Speech normal.         Behavior: Behavior normal.         Thought Content: Thought content normal.         Judgment: Judgment normal.

## 2025-01-26 DIAGNOSIS — G40.209 EPILEPSY WITH PARTIAL COMPLEX SEIZURES, WITHOUT STATUS EPILEPTICUS (HCC): ICD-10-CM

## 2025-01-26 DIAGNOSIS — R42 DIZZINESS: ICD-10-CM

## 2025-01-26 DIAGNOSIS — R25.1 TREMOR: ICD-10-CM

## 2025-01-26 DIAGNOSIS — F41.9 ANXIETY: ICD-10-CM

## 2025-01-26 DIAGNOSIS — R68.89 SPELLS OF DECREASED ATTENTIVENESS: ICD-10-CM

## 2025-01-26 DIAGNOSIS — R43.0 ANOSMIA: ICD-10-CM

## 2025-01-27 RX ORDER — DULOXETIN HYDROCHLORIDE 60 MG/1
60 CAPSULE, DELAYED RELEASE ORAL DAILY
Qty: 90 CAPSULE | Refills: 0 | Status: SHIPPED | OUTPATIENT
Start: 2025-01-27

## 2025-01-27 RX ORDER — OXCARBAZEPINE 300 MG/1
TABLET, FILM COATED ORAL
Qty: 450 TABLET | Refills: 0 | Status: SHIPPED | OUTPATIENT
Start: 2025-01-27

## 2025-02-13 DIAGNOSIS — R43.0 ANOSMIA: ICD-10-CM

## 2025-02-13 DIAGNOSIS — R42 DIZZINESS: ICD-10-CM

## 2025-02-13 DIAGNOSIS — G40.209 EPILEPSY WITH PARTIAL COMPLEX SEIZURES, WITHOUT STATUS EPILEPTICUS (HCC): ICD-10-CM

## 2025-02-13 DIAGNOSIS — R68.89 SPELLS OF DECREASED ATTENTIVENESS: ICD-10-CM

## 2025-02-13 DIAGNOSIS — F41.9 ANXIETY: ICD-10-CM

## 2025-02-13 DIAGNOSIS — R25.1 TREMOR: ICD-10-CM

## 2025-02-13 RX ORDER — DULOXETIN HYDROCHLORIDE 60 MG/1
60 CAPSULE, DELAYED RELEASE ORAL DAILY
Qty: 90 CAPSULE | Refills: 1 | OUTPATIENT
Start: 2025-02-13

## 2025-02-13 RX ORDER — OXCARBAZEPINE 300 MG/1
TABLET, FILM COATED ORAL
Qty: 540 TABLET | Refills: 1 | OUTPATIENT
Start: 2025-02-13

## 2025-02-13 NOTE — TELEPHONE ENCOUNTER
Reason for call:   [x] Refill   [] Prior Auth  [] Other:     Office:   [] PCP/Provider -   [x] Specialty/Provider - neuro/Guevara    Duloxetine 60mg  1 cap daily #90    Oxcarbazepine 300mg  3 tab bid #540    Pharmacy: [facility] - [address] [phone]     Does the patient have enough for 3 days?   [x] Yes   [] No - Send as HP to POD

## 2025-02-19 DIAGNOSIS — R68.89 SPELLS OF DECREASED ATTENTIVENESS: ICD-10-CM

## 2025-02-19 DIAGNOSIS — R42 DIZZINESS: ICD-10-CM

## 2025-02-19 DIAGNOSIS — R43.0 ANOSMIA: ICD-10-CM

## 2025-02-19 DIAGNOSIS — R25.1 TREMOR: ICD-10-CM

## 2025-02-19 DIAGNOSIS — F41.9 ANXIETY: ICD-10-CM

## 2025-02-19 DIAGNOSIS — G40.209 EPILEPSY WITH PARTIAL COMPLEX SEIZURES, WITHOUT STATUS EPILEPTICUS (HCC): ICD-10-CM

## 2025-02-19 RX ORDER — OXCARBAZEPINE 300 MG/1
TABLET, FILM COATED ORAL
Qty: 540 TABLET | Refills: 3 | Status: SHIPPED | OUTPATIENT
Start: 2025-02-19

## 2025-02-19 RX ORDER — DULOXETIN HYDROCHLORIDE 60 MG/1
60 CAPSULE, DELAYED RELEASE ORAL DAILY
Qty: 90 CAPSULE | Refills: 3 | Status: SHIPPED | OUTPATIENT
Start: 2025-02-19

## 2025-04-09 ENCOUNTER — OFFICE VISIT (OUTPATIENT)
Dept: NEUROLOGY | Facility: CLINIC | Age: 65
End: 2025-04-09
Payer: COMMERCIAL

## 2025-04-09 VITALS
BODY MASS INDEX: 29.46 KG/M2 | WEIGHT: 194.4 LBS | RESPIRATION RATE: 16 BRPM | DIASTOLIC BLOOD PRESSURE: 70 MMHG | HEIGHT: 68 IN | HEART RATE: 55 BPM | TEMPERATURE: 97.6 F | SYSTOLIC BLOOD PRESSURE: 122 MMHG | OXYGEN SATURATION: 99 %

## 2025-04-09 DIAGNOSIS — G40.209 NONINTRACTABLE EPILEPSY WITH COMPLEX PARTIAL SEIZURES (HCC): Primary | ICD-10-CM

## 2025-04-09 DIAGNOSIS — G47.52 DREAM ENACTMENT BEHAVIOR: ICD-10-CM

## 2025-04-09 PROCEDURE — 99213 OFFICE O/P EST LOW 20 MIN: CPT | Performed by: PSYCHIATRY & NEUROLOGY

## 2025-04-09 NOTE — PROGRESS NOTES
Name: Olman Merlos      : 1960      MRN: [id number]  Encounter Provider: Kiara Waterman DO  Encounter Date: 2025   Encounter department: [facility] [place]  :  Assessment & Plan  Nonintractable epilepsy with complex partial seizures (HCC)    Orders:    Comprehensive metabolic panel; Future    Oxcarbazepine level; Future    CBC and differential; Future  Olman  a 64-year-old patient with complex seizures.  He is currently doing well on Trileptal 900 mg twice a day.  I will order laboratory studies to be performed prior to next visit.  Dream enactment behavior  He does have vivid dreams which can be violent at times.  He was seen by sleep several months ago and who suggested medications but potential REM behavioral disorder.  And he started on melatonin.  He has not been utilizing melatonin over the last few months as he does not agree that he does have severe vivid dreams.  His wife does report his symptoms are persistent.  He has fallen out of bed many times but has fortunately not hurt himself.  I emphasized the need to take melatonin start with a low dose.  If he continues to be symptomatic we may need to consider other options.  He was agreeable to restart the melatonin.    May need to consider reevaluation by sleep in the future.               History of Present Illness       Olman Merlos is a 64   y.o. male w/ PMH focal onset impaired aware seizures currently adequately controlled on oxcarbazepine 900 mg bid .  He denies any nocturnal seizures.  He denies any staring episodes.  Has had no recent laboratory studies.     He was seen by sleep medicine and did try the CPAP multiple times but he was unable to tolerate it.  Noted to have vivid dreams and he was utilizing melatonin on a regular basis.  However recently he has stopped utilizing it.  His wife Ayala has noted more vivid dreams, screaming with at times sleepwalking.  She is concerned about potential injury.  He  has fallen out of bed several times.  Fortunately has had no injuries.       .     He denies any restlessness in his legs.    We discussed the reason why he does not utilize melatonin he does not feel that he requires it.          Review of Systems   Constitutional: Negative.  Negative for chills and fever.   HENT: Negative.  Negative for ear pain and sore throat.    Eyes: Negative.  Negative for pain and visual disturbance.   Respiratory: Negative.  Negative for cough and shortness of breath.    Cardiovascular: Negative.  Negative for chest pain and palpitations.   Gastrointestinal: Negative.  Negative for abdominal pain and vomiting.   Endocrine: Negative.    Genitourinary: Negative.  Negative for dysuria and hematuria.   Musculoskeletal: Negative.  Negative for arthralgias and back pain.   Skin: Negative.  Negative for color change and rash.   Allergic/Immunologic: Negative.    Neurological: Negative.  Negative for seizures and syncope.   Hematological: Negative.    Psychiatric/Behavioral: Negative.     All other systems reviewed and are negative.   I have personally reviewed the MA's review of systems and made changes as necessary.    Medical History Reviewed by provider this encounter:  Meds     .  Current Outpatient Medications on File Prior to Visit   Medication Sig Dispense Refill    DULoxetine (CYMBALTA) 60 mg delayed release capsule Take 1 capsule (60 mg total) by mouth daily 1 month supply 90 capsule 3    Ibuprofen 200 MG CAPS Take by mouth as needed      Melatonin 3-10 MG TABS Take 3 mg by mouth At bedtime taking 6mg as needed (Patient taking differently: Take 9 mg by mouth in the morning At bedtime taking 6mg as needed)      OXcarbazepine (TRILEPTAL) 300 mg tablet TAKE 3 TABLETS IN THE MORNING AND 3 TABLETS AT NIGHT, one month supply 540 tablet 3    ofloxacin (FLOXIN) 0.3 % otic solution Administer 10 drops to the right ear 2 (two) times a day (Patient not taking: Reported on 4/9/2025) 5 mL 0  "    Current Facility-Administered Medications on File Prior to Visit   Medication Dose Route Frequency Provider Last Rate Last Admin    lidocaine-epinephrine (XYLOCAINE/EPINEPHRINE) 1 %-1:100,000 injection 30 mL  30 mL Infiltration Once Jarad Briones MD          Social History     Tobacco Use    Smoking status: Some Days     Current packs/day: 0.10     Average packs/day: 1.9 packs/day for 46.4 years (90.1 ttl pk-yrs)     Types: Cigarettes     Start date: 7/28/1974     Last attempt to quit: 7/28/2019    Smokeless tobacco: Never   Vaping Use    Vaping status: Never Used   Substance and Sexual Activity    Alcohol use: Not Currently     Comment: quit 3/2019    Drug use: No    Sexual activity: Not on file        Objective   /70 (BP Location: Right arm, Patient Position: Sitting, Cuff Size: Adult)   Pulse 55   Temp 97.6 °F (36.4 °C) (Temporal)   Resp 16   Ht 5' 7.5\" (1.715 m)   Wt 88.2 kg (194 lb 6.4 oz)   SpO2 99%   BMI 30.00 kg/m²     Physical Exam  Eyes:      General: Lids are normal.      Extraocular Movements: Extraocular movements intact.      Pupils: Pupils are equal, round, and reactive to light.   Neurological:      Motor: Motor strength is normal.     Deep Tendon Reflexes:      Reflex Scores:       Patellar reflexes are 2+ on the right side and 2+ on the left side.       Achilles reflexes are 1+ on the right side and 1+ on the left side.      Neurological Exam    Cranial Nerves  CN II: Visual acuity is normal. Visual fields full to confrontation.  CN III, IV, VI: Extraocular movements intact bilaterally. Normal lids and orbits bilaterally. Pupils equal round and reactive to light bilaterally.  CN V: Facial sensation is normal.  CN VII: Full and symmetric facial movement.  CN VIII: Hearing is normal.  CN IX, X: Palate elevates symmetrically. Normal gag reflex.  CN XI: Shoulder shrug strength is normal.  CN XII: Tongue midline without atrophy or fasciculations.    Motor  Normal muscle bulk " throughout. No fasciculations present. Strength is 5/5 throughout all four extremities.    Sensory  Light touch is normal in upper and lower extremities. Temperature is normal in upper and lower extremities.     Reflexes                                            Right                      Left  Patellar                                2+                         2+  Achilles                                1+                         1+  Right Plantar: downgoing  Left Plantar: downgoing    Coordination  Right: Finger-to-nose normal.Left: Finger-to-nose normal.    Gait  Normal casual, toe, heel and tandem gait. Able to rise from chair without using arms.    Return to our offices in several months but contact us if there is any other new questions or problems in the interim    I have spent a total time of 25 minutes in caring for this patient on the day of the visit/encounter including Counseling / Coordination of care, Documenting in the medical record, Reviewing/placing orders in the medical record (including tests, medications, and/or procedures), and Obtaining or reviewing history  .

## 2025-04-09 NOTE — ASSESSMENT & PLAN NOTE
Orders:    Comprehensive metabolic panel; Future    Oxcarbazepine level; Future    CBC and differential; Future  Olman  a 64-year-old patient with complex seizures.  He is currently doing well on Trileptal 900 mg twice a day.  I will order laboratory studies to be performed prior to next visit.

## 2025-04-09 NOTE — ASSESSMENT & PLAN NOTE
Rajani rodriguez , well cotanotrllaende       The patient is doing well without any recurrent seizures we will continue him on the current dose of Trileptal.  Overall his depression and anxiety is improved with fluoxetine.

## 2025-04-09 NOTE — ASSESSMENT & PLAN NOTE
He does have vivid dreams which can be violent at times.  He was seen by sleep several months ago and who suggested medications but potential REM behavioral disorder.  And he started on melatonin.  He has not been utilizing melatonin over the last few months as he does not agree that he does have severe vivid dreams.  His wife does report his symptoms are persistent.  He has fallen out of bed many times but has fortunately not hurt himself.  I emphasized the need to take melatonin start with a low dose.  If he continues to be symptomatic we may need to consider other options.  He was agreeable to restart the melatonin.    May need to consider reevaluation by sleep in the future.

## 2025-05-28 LAB
10OH-CARBAZEPINE SERPL-MCNC: 20.9 MCG/ML
ALBUMIN SERPL-MCNC: 4.4 G/DL (ref 3.5–5.7)
ALP SERPL-CCNC: 48 U/L (ref 35–120)
ALT SERPL-CCNC: 13 U/L
ANION GAP SERPL CALCULATED.3IONS-SCNC: 7 MMOL/L (ref 3–11)
AST SERPL-CCNC: 12 U/L
BASOPHILS # BLD AUTO: 0.1 THOU/CMM (ref 0–0.1)
BASOPHILS NFR BLD AUTO: 1 %
BILIRUB SERPL-MCNC: 0.4 MG/DL (ref 0.2–1)
BUN SERPL-MCNC: 16 MG/DL (ref 7–28)
CALCIUM SERPL-MCNC: 9.2 MG/DL (ref 8.5–10.5)
CHLORIDE SERPL-SCNC: 107 MMOL/L (ref 100–109)
CO2 SERPL-SCNC: 28 MMOL/L (ref 21–31)
CREAT SERPL-MCNC: 0.84 MG/DL (ref 0.53–1.3)
CYTOLOGY CMNT CVX/VAG CYTO-IMP: ABNORMAL
DIFFERENTIAL METHOD BLD: NORMAL
EOSINOPHIL # BLD AUTO: 0.1 THOU/CMM (ref 0–0.5)
EOSINOPHIL NFR BLD AUTO: 3 %
ERYTHROCYTE [DISTWIDTH] IN BLOOD BY AUTOMATED COUNT: 13.7 % (ref 12–16)
GFR/BSA.PRED SERPLBLD CYS-BASED-ARV: 96 ML/MIN/{1.73_M2}
GLUCOSE SERPL-MCNC: 140 MG/DL (ref 65–99)
HCT VFR BLD AUTO: 42.1 % (ref 37–48)
HGB BLD-MCNC: 14.5 G/DL (ref 12.5–17)
LYMPHOCYTES # BLD AUTO: 1.6 THOU/CMM (ref 1–3)
LYMPHOCYTES NFR BLD AUTO: 29 %
MCH RBC QN AUTO: 32.1 PG (ref 27–36)
MCHC RBC AUTO-ENTMCNC: 34.4 G/DL (ref 32–37)
MCV RBC AUTO: 93 FL (ref 80–100)
MONOCYTES # BLD AUTO: 0.5 THOU/CMM (ref 0.3–1)
MONOCYTES NFR BLD AUTO: 10 %
NEUTROPHILS # BLD AUTO: 3.2 THOU/CMM (ref 1.8–7.8)
NEUTROPHILS NFR BLD AUTO: 57 %
PLATELET # BLD AUTO: 227 THOU/CMM (ref 140–350)
PMV BLD REES-ECKER: 7.9 FL (ref 7.5–11.3)
POTASSIUM SERPL-SCNC: 4.6 MMOL/L (ref 3.5–5.2)
PROT SERPL-MCNC: 6.7 G/DL (ref 6.3–8.3)
RBC # BLD AUTO: 4.5 MILL/CMM (ref 4–5.4)
SODIUM SERPL-SCNC: 142 MMOL/L (ref 135–145)
WBC # BLD AUTO: 5.5 THOU/CMM (ref 4–10.5)

## (undated) DEVICE — SYRINGE 10ML LL

## (undated) DEVICE — NEEDLE 25G X 1 1/2

## (undated) DEVICE — CURITY NON-ADHERENT STRIPS: Brand: CURITY

## (undated) DEVICE — INTENDED FOR TISSUE SEPARATION, AND OTHER PROCEDURES THAT REQUIRE A SHARP SURGICAL BLADE TO PUNCTURE OR CUT.: Brand: BARD-PARKER ® CARBON RIB-BACK BLADES

## (undated) DEVICE — SUT ETHILON 4-0 PS-2 18 IN 1667H

## (undated) DEVICE — GAUZE SPONGES,16 PLY: Brand: CURITY

## (undated) DEVICE — ACE WRAP 3 IN UNSTERILE

## (undated) DEVICE — BOWL: 16OZ PEELPOUCH 75/CS 16/PLT: Brand: MEDEGEN MEDICAL PRODUCTS, LLC

## (undated) DEVICE — ASTOUND STANDARD SURGICAL GOWN, XL: Brand: CONVERTORS

## (undated) DEVICE — CHLORAPREP HI-LITE 26ML ORANGE

## (undated) DEVICE — DRAPE SHEET THREE QUARTER

## (undated) DEVICE — NEEDLE COUNTER LG W/RULER

## (undated) DEVICE — SYRINGE BULB 2 OZ

## (undated) DEVICE — HEAVY DUTY TABLE COVER: Brand: CONVERTORS

## (undated) DEVICE — TIBURON HAND DRAPE: Brand: CONVERTORS

## (undated) DEVICE — NEEDLE 18 G X 1 1/2

## (undated) DEVICE — TOWEL SET X-RAY

## (undated) DEVICE — GLOVE INDICATOR PI UNDERGLOVE SZ 8 BLUE

## (undated) DEVICE — LIGHT GLOVE GREEN

## (undated) DEVICE — GLOVE PI ULTRA TOUCH SZ.8.0

## (undated) DEVICE — SPONGE 4 X 4 XRAY 16 PLY STRL LF RFD

## (undated) DEVICE — KNIFE CARPAL TUNNEL DISP

## (undated) DEVICE — SKIN MARKER DUAL TIP WITH RULER CAP, FLEXIBLE RULER AND LABELS: Brand: DEVON